# Patient Record
Sex: MALE | Race: WHITE | NOT HISPANIC OR LATINO
[De-identification: names, ages, dates, MRNs, and addresses within clinical notes are randomized per-mention and may not be internally consistent; named-entity substitution may affect disease eponyms.]

---

## 2018-05-08 ENCOUNTER — APPOINTMENT (OUTPATIENT)
Dept: HEART AND VASCULAR | Facility: CLINIC | Age: 53
End: 2018-05-08
Payer: COMMERCIAL

## 2018-05-08 VITALS
TEMPERATURE: 97.8 F | HEART RATE: 70 BPM | DIASTOLIC BLOOD PRESSURE: 84 MMHG | OXYGEN SATURATION: 97 % | BODY MASS INDEX: 31.5 KG/M2 | WEIGHT: 220 LBS | HEIGHT: 70 IN | SYSTOLIC BLOOD PRESSURE: 128 MMHG

## 2018-05-08 DIAGNOSIS — F17.200 NICOTINE DEPENDENCE, UNSPECIFIED, UNCOMPLICATED: ICD-10-CM

## 2018-05-08 PROCEDURE — 93000 ELECTROCARDIOGRAM COMPLETE: CPT

## 2018-05-08 PROCEDURE — 99386 PREV VISIT NEW AGE 40-64: CPT | Mod: 25

## 2019-05-29 ENCOUNTER — INPATIENT (INPATIENT)
Facility: HOSPITAL | Age: 54
LOS: 8 days | Discharge: HOME HEALTH | End: 2019-06-07
Attending: THORACIC SURGERY (CARDIOTHORACIC VASCULAR SURGERY) | Admitting: THORACIC SURGERY (CARDIOTHORACIC VASCULAR SURGERY)
Payer: COMMERCIAL

## 2019-05-29 VITALS
WEIGHT: 220.02 LBS | DIASTOLIC BLOOD PRESSURE: 83 MMHG | SYSTOLIC BLOOD PRESSURE: 160 MMHG | TEMPERATURE: 98 F | RESPIRATION RATE: 18 BRPM | OXYGEN SATURATION: 99 % | HEART RATE: 76 BPM

## 2019-05-29 DIAGNOSIS — J95.821 ACUTE POSTPROCEDURAL RESPIRATORY FAILURE: ICD-10-CM
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
## 2019-05-29 LAB
ALBUMIN SERPL ELPH-MCNC: 4.4 G/DL — SIGNIFICANT CHANGE UP (ref 3.5–5.2)
ALP SERPL-CCNC: 96 U/L — SIGNIFICANT CHANGE UP (ref 30–115)
ALT FLD-CCNC: 45 U/L — HIGH (ref 0–41)
ANION GAP SERPL CALC-SCNC: 10 MMOL/L — SIGNIFICANT CHANGE UP (ref 7–14)
ANION GAP SERPL CALC-SCNC: 14 MMOL/L — SIGNIFICANT CHANGE UP (ref 7–14)
APTT BLD: 29.3 SEC — SIGNIFICANT CHANGE UP (ref 27–39.2)
AST SERPL-CCNC: 25 U/L — SIGNIFICANT CHANGE UP (ref 0–41)
BASOPHILS # BLD AUTO: 0.04 K/UL — SIGNIFICANT CHANGE UP (ref 0–0.2)
BASOPHILS NFR BLD AUTO: 0.4 % — SIGNIFICANT CHANGE UP (ref 0–1)
BILIRUB SERPL-MCNC: 0.3 MG/DL — SIGNIFICANT CHANGE UP (ref 0.2–1.2)
BUN SERPL-MCNC: 19 MG/DL — SIGNIFICANT CHANGE UP (ref 10–20)
BUN SERPL-MCNC: 22 MG/DL — HIGH (ref 10–20)
CALCIUM SERPL-MCNC: 9.7 MG/DL — SIGNIFICANT CHANGE UP (ref 8.5–10.1)
CALCIUM SERPL-MCNC: 9.7 MG/DL — SIGNIFICANT CHANGE UP (ref 8.5–10.1)
CHLORIDE SERPL-SCNC: 102 MMOL/L — SIGNIFICANT CHANGE UP (ref 98–110)
CHLORIDE SERPL-SCNC: 105 MMOL/L — SIGNIFICANT CHANGE UP (ref 98–110)
CHOLEST SERPL-MCNC: 214 MG/DL — HIGH (ref 100–200)
CK MB CFR SERPL CALC: 3.4 NG/ML — SIGNIFICANT CHANGE UP (ref 0.6–6.3)
CO2 SERPL-SCNC: 27 MMOL/L — SIGNIFICANT CHANGE UP (ref 17–32)
CO2 SERPL-SCNC: 27 MMOL/L — SIGNIFICANT CHANGE UP (ref 17–32)
CREAT SERPL-MCNC: 0.9 MG/DL — SIGNIFICANT CHANGE UP (ref 0.7–1.5)
CREAT SERPL-MCNC: 1.1 MG/DL — SIGNIFICANT CHANGE UP (ref 0.7–1.5)
EOSINOPHIL # BLD AUTO: 0.27 K/UL — SIGNIFICANT CHANGE UP (ref 0–0.7)
EOSINOPHIL NFR BLD AUTO: 2.8 % — SIGNIFICANT CHANGE UP (ref 0–8)
ESTIMATED AVERAGE GLUCOSE: 131 MG/DL — HIGH (ref 68–114)
GLUCOSE SERPL-MCNC: 118 MG/DL — HIGH (ref 70–99)
GLUCOSE SERPL-MCNC: 142 MG/DL — HIGH (ref 70–99)
HBA1C BLD-MCNC: 6.2 % — HIGH (ref 4–5.6)
HCT VFR BLD CALC: 44.2 % — SIGNIFICANT CHANGE UP (ref 42–52)
HCT VFR BLD CALC: 45.4 % — SIGNIFICANT CHANGE UP (ref 42–52)
HDLC SERPL-MCNC: 33 MG/DL — LOW
HGB BLD-MCNC: 15.1 G/DL — SIGNIFICANT CHANGE UP (ref 14–18)
HGB BLD-MCNC: 15.2 G/DL — SIGNIFICANT CHANGE UP (ref 14–18)
IMM GRANULOCYTES NFR BLD AUTO: 0.3 % — SIGNIFICANT CHANGE UP (ref 0.1–0.3)
INR BLD: 1.04 RATIO — SIGNIFICANT CHANGE UP (ref 0.65–1.3)
LIDOCAIN IGE QN: 38 U/L — SIGNIFICANT CHANGE UP (ref 7–60)
LIPID PNL WITH DIRECT LDL SERPL: 163 MG/DL — HIGH (ref 4–129)
LYMPHOCYTES # BLD AUTO: 2.77 K/UL — SIGNIFICANT CHANGE UP (ref 1.2–3.4)
LYMPHOCYTES # BLD AUTO: 28.9 % — SIGNIFICANT CHANGE UP (ref 20.5–51.1)
MAGNESIUM SERPL-MCNC: 1.9 MG/DL — SIGNIFICANT CHANGE UP (ref 1.8–2.4)
MAGNESIUM SERPL-MCNC: 1.9 MG/DL — SIGNIFICANT CHANGE UP (ref 1.8–2.4)
MCHC RBC-ENTMCNC: 29.2 PG — SIGNIFICANT CHANGE UP (ref 27–31)
MCHC RBC-ENTMCNC: 29.6 PG — SIGNIFICANT CHANGE UP (ref 27–31)
MCHC RBC-ENTMCNC: 33.5 G/DL — SIGNIFICANT CHANGE UP (ref 32–37)
MCHC RBC-ENTMCNC: 34.2 G/DL — SIGNIFICANT CHANGE UP (ref 32–37)
MCV RBC AUTO: 86.7 FL — SIGNIFICANT CHANGE UP (ref 80–94)
MCV RBC AUTO: 87.1 FL — SIGNIFICANT CHANGE UP (ref 80–94)
MONOCYTES # BLD AUTO: 0.78 K/UL — HIGH (ref 0.1–0.6)
MONOCYTES NFR BLD AUTO: 8.1 % — SIGNIFICANT CHANGE UP (ref 1.7–9.3)
NEUTROPHILS # BLD AUTO: 5.7 K/UL — SIGNIFICANT CHANGE UP (ref 1.4–6.5)
NEUTROPHILS NFR BLD AUTO: 59.5 % — SIGNIFICANT CHANGE UP (ref 42.2–75.2)
NRBC # BLD: 0 /100 WBCS — SIGNIFICANT CHANGE UP (ref 0–0)
NRBC # BLD: 0 /100 WBCS — SIGNIFICANT CHANGE UP (ref 0–0)
NT-PROBNP SERPL-SCNC: 27 PG/ML — SIGNIFICANT CHANGE UP (ref 0–300)
PLATELET # BLD AUTO: 203 K/UL — SIGNIFICANT CHANGE UP (ref 130–400)
PLATELET # BLD AUTO: 211 K/UL — SIGNIFICANT CHANGE UP (ref 130–400)
POTASSIUM SERPL-MCNC: 4.2 MMOL/L — SIGNIFICANT CHANGE UP (ref 3.5–5)
POTASSIUM SERPL-MCNC: 4.3 MMOL/L — SIGNIFICANT CHANGE UP (ref 3.5–5)
POTASSIUM SERPL-SCNC: 4.2 MMOL/L — SIGNIFICANT CHANGE UP (ref 3.5–5)
POTASSIUM SERPL-SCNC: 4.3 MMOL/L — SIGNIFICANT CHANGE UP (ref 3.5–5)
PROT SERPL-MCNC: 8.1 G/DL — HIGH (ref 6–8)
PROTHROM AB SERPL-ACNC: 12 SEC — SIGNIFICANT CHANGE UP (ref 9.95–12.87)
RBC # BLD: 5.1 M/UL — SIGNIFICANT CHANGE UP (ref 4.7–6.1)
RBC # BLD: 5.21 M/UL — SIGNIFICANT CHANGE UP (ref 4.7–6.1)
RBC # FLD: 13.2 % — SIGNIFICANT CHANGE UP (ref 11.5–14.5)
RBC # FLD: 13.3 % — SIGNIFICANT CHANGE UP (ref 11.5–14.5)
SODIUM SERPL-SCNC: 142 MMOL/L — SIGNIFICANT CHANGE UP (ref 135–146)
SODIUM SERPL-SCNC: 143 MMOL/L — SIGNIFICANT CHANGE UP (ref 135–146)
TOTAL CHOLESTEROL/HDL RATIO MEASUREMENT: 6.5 RATIO — HIGH (ref 4–5.5)
TRIGL SERPL-MCNC: 152 MG/DL — HIGH (ref 10–149)
TROPONIN T SERPL-MCNC: 0.02 NG/ML — HIGH
TROPONIN T SERPL-MCNC: <0.01 NG/ML — SIGNIFICANT CHANGE UP
WBC # BLD: 7.93 K/UL — SIGNIFICANT CHANGE UP (ref 4.8–10.8)
WBC # BLD: 9.59 K/UL — SIGNIFICANT CHANGE UP (ref 4.8–10.8)
WBC # FLD AUTO: 7.93 K/UL — SIGNIFICANT CHANGE UP (ref 4.8–10.8)
WBC # FLD AUTO: 9.59 K/UL — SIGNIFICANT CHANGE UP (ref 4.8–10.8)

## 2019-05-29 PROCEDURE — 93016 CV STRESS TEST SUPVJ ONLY: CPT

## 2019-05-29 PROCEDURE — 71045 X-RAY EXAM CHEST 1 VIEW: CPT | Mod: 26,59

## 2019-05-29 PROCEDURE — 78452 HT MUSCLE IMAGE SPECT MULT: CPT | Mod: 26

## 2019-05-29 PROCEDURE — 93018 CV STRESS TEST I&R ONLY: CPT

## 2019-05-29 PROCEDURE — 71046 X-RAY EXAM CHEST 2 VIEWS: CPT | Mod: 26

## 2019-05-29 PROCEDURE — 99285 EMERGENCY DEPT VISIT HI MDM: CPT | Mod: 25

## 2019-05-29 PROCEDURE — 93010 ELECTROCARDIOGRAM REPORT: CPT | Mod: 59

## 2019-05-29 PROCEDURE — 99232 SBSQ HOSP IP/OBS MODERATE 35: CPT

## 2019-05-29 RX ORDER — NITROGLYCERIN 6.5 MG
0.4 CAPSULE, EXTENDED RELEASE ORAL
Refills: 0 | Status: DISCONTINUED | OUTPATIENT
Start: 2019-05-29 | End: 2019-05-31

## 2019-05-29 RX ORDER — LISINOPRIL 2.5 MG/1
5 TABLET ORAL DAILY
Refills: 0 | Status: DISCONTINUED | OUTPATIENT
Start: 2019-05-29 | End: 2019-05-30

## 2019-05-29 RX ORDER — ASPIRIN/CALCIUM CARB/MAGNESIUM 324 MG
324 TABLET ORAL ONCE
Refills: 0 | Status: COMPLETED | OUTPATIENT
Start: 2019-05-29 | End: 2019-05-29

## 2019-05-29 RX ORDER — ATORVASTATIN CALCIUM 80 MG/1
40 TABLET, FILM COATED ORAL AT BEDTIME
Refills: 0 | Status: DISCONTINUED | OUTPATIENT
Start: 2019-05-29 | End: 2019-05-31

## 2019-05-29 RX ORDER — METOPROLOL TARTRATE 50 MG
50 TABLET ORAL
Refills: 0 | Status: DISCONTINUED | OUTPATIENT
Start: 2019-05-29 | End: 2019-05-30

## 2019-05-29 RX ORDER — CHLORHEXIDINE GLUCONATE 213 G/1000ML
1 SOLUTION TOPICAL
Refills: 0 | Status: DISCONTINUED | OUTPATIENT
Start: 2019-05-29 | End: 2019-05-31

## 2019-05-29 RX ORDER — ENOXAPARIN SODIUM 100 MG/ML
40 INJECTION SUBCUTANEOUS EVERY 24 HOURS
Refills: 0 | Status: DISCONTINUED | OUTPATIENT
Start: 2019-05-29 | End: 2019-05-30

## 2019-05-29 RX ORDER — ATORVASTATIN CALCIUM 80 MG/1
20 TABLET, FILM COATED ORAL AT BEDTIME
Refills: 0 | Status: DISCONTINUED | OUTPATIENT
Start: 2019-05-29 | End: 2019-05-29

## 2019-05-29 RX ORDER — ASPIRIN/CALCIUM CARB/MAGNESIUM 324 MG
81 TABLET ORAL DAILY
Refills: 0 | Status: DISCONTINUED | OUTPATIENT
Start: 2019-05-29 | End: 2019-05-31

## 2019-05-29 RX ADMIN — Medication 50 MILLIGRAM(S): at 18:50

## 2019-05-29 RX ADMIN — ENOXAPARIN SODIUM 40 MILLIGRAM(S): 100 INJECTION SUBCUTANEOUS at 18:55

## 2019-05-29 RX ADMIN — Medication 81 MILLIGRAM(S): at 12:35

## 2019-05-29 RX ADMIN — Medication 324 MILLIGRAM(S): at 02:35

## 2019-05-29 RX ADMIN — LISINOPRIL 5 MILLIGRAM(S): 2.5 TABLET ORAL at 18:50

## 2019-05-29 RX ADMIN — ATORVASTATIN CALCIUM 40 MILLIGRAM(S): 80 TABLET, FILM COATED ORAL at 21:37

## 2019-05-29 NOTE — CONSULT NOTE ADULT - SUBJECTIVE AND OBJECTIVE BOX
CHIEF COMPLAINT:Patient is a 53y old  Male who presents with a chief complaint of Chest discomfort (29 May 2019 03:55)      HISTORY OF PRESENT ILLNESS:   HPI:  The patient is a 53-year-old male with no known PMH who presented with a three-day history of chest discomfort.  He described the discomfort as mid-sternal and intermittent (lasting about 10 minutes at a time and up to two episodes per day).  It is associated with diaphoresis, radiation to the jaw, and an abnormal sensation in her arms ("like my arms are without blood").  He has been experiencing chest discomfort for the past several months but noticed that they have been occurring more frequently over the past 3 days.  Otherwise, he denied having fever, chills, N/V/D, abdominal pain, diarrhea, or urinary complaints.  To note, he has not seen a medical doctor in many years. (29 May 2019 03:55)    PAST MEDICAL & SURGICAL HISTORY:  No pertinent past medical history  No significant past surgical history    FAMILY HISTORY:  Family history of heart disease: Mother and father in their 60&#x27;s    Allergies    No Known Allergies    Intolerances    	  Home Medications:    MEDICATIONS  (STANDING):  aspirin  chewable 81 milliGRAM(s) Oral daily  atorvastatin 20 milliGRAM(s) Oral at bedtime  chlorhexidine 4% Liquid 1 Application(s) Topical <User Schedule>  enoxaparin Injectable 40 milliGRAM(s) SubCutaneous every 24 hours    MEDICATIONS  (PRN):  nitroglycerin     SubLingual 0.4 milliGRAM(s) SubLingual every 5 minutes PRN Chest Pain        SOCIAL HISTORY:    [ ] Non-smoker  [ ] Smoker: 1 ppd  [ ] Alcohol: socially     REVIEW OF SYSTEMS:  CONSTITUTIONAL: No fever, weight loss, or fatigue  CARDIOLOGY: PAtient denies chest pain, shortness of breath or syncopal episodes.   RESPIRATORY: denies shortness of breath, wheezeing.   NEUROLOGICAL: NO weakness, no focal deficits to report.  ENDOCRINOLOGICAL: no recent change in diabetic medications.   GI: no BRBPR, no N,V,diarrhea.    PSYCHIATRY: normal mood and affect  HEENT: no nasal discharge, no ecchymosis  SKIN: no ecchymosis, no breakdown  MUSCULOSKELETAL: Full range of motion x4.      PHYSICAL EXAM:  T(C): 35.8 (19 @ 07:33), Max: 36.6 (19 @ 01:44)  HR: 95 (19 @ 07:33) (76 - 95)  BP: 179/100 (19 @ 07:33) (160/83 - 179/100)  RR: 17 (19 @ 07:33) (17 - 18)  SpO2: 95% (19 @ 07:33) (95% - 99%)  Wt(kg): --  I&O's Summary    Daily     Daily     General Appearance: Normal	  Cardiovascular: Normal S1 S2, No JVD, No murmurs, No edema  Respiratory: Lungs clear to auscultation	  Psychiatry: A & O x 3, Mood & affect appropriate  Gastrointestinal:  Soft, Non-tender  Skin: No rashes, No ecchymoses, No cyanosis	  Neurologic: Non-focal  Extremities/MsK: Normal range of motion, No clubbing, cyanosis or edema  Vascular: Peripheral pulses palpable 2+ bilaterally        LABS:	 	                        15.1   7.93  )-----------( 203      ( 29 May 2019 11:30 )             44.2         142  |  105  |  19  ----------------------------<  118<H>  4.3   |  27  |  0.9      143  |  102  |  22<H>  ----------------------------<  142<H>  4.2   |  27  |  1.1    Ca    9.7      29 May 2019 11:30  Ca    9.7      29 May 2019 01:58  Mg     1.9       Mg     1.9         TPro  8.1<H>  /  Alb  4.4  /  TBili  0.3  /  DBili  x   /  AST  25  /  ALT  45<H>  /  AlkPhos  96        proBNP: Serum Pro-Brain Natriuretic Peptide: 27 pg/mL ( @ 01:58)    Lipid Profile: LDL: 163, T  HgA1c:   TSH:       CARDIAC MARKERS:  Troponin T, Serum: <0.01 ng/mL ( @ 11:30)  Troponin T, Serum: 0.02 ng/mL ( @ 01:58)    ECG:  NSR    RADIOLOGY:  Xray Chest 2 Views PA/Lat (19)  No radiographic evidence of acute cardiopulmonary disease. CHIEF COMPLAINT:Patient is a 53y old  Male who presents with a chief complaint of Chest discomfort (29 May 2019 03:55)      HISTORY OF PRESENT ILLNESS:   HPI:  The patient is a 53-year-old male with no known PMH who presented with a three-day history of chest discomfort.  He described the discomfort as mid-sternal and intermittent (lasting about 10 minutes at a time and up to two episodes per day).  It is associated with diaphoresis, radiation to the jaw, and an abnormal sensation in her arms ("like my arms are without blood").  He has been experiencing chest discomfort for the past several months but noticed that they have been occurring more frequently over the past 3 days.  Otherwise, he denied having fever, chills, N/V/D, abdominal pain, diarrhea, or urinary complaints.  To note, he has not seen a medical doctor in many years. (29 May 2019 03:55)    PAST MEDICAL & SURGICAL HISTORY:  No pertinent past medical history  No significant past surgical history    FAMILY HISTORY:  Family history of heart disease: Mother and father in their 60&#x27;s    Allergies    No Known Allergies    Intolerances    	  Home Medications:    MEDICATIONS  (STANDING):  aspirin  chewable 81 milliGRAM(s) Oral daily  atorvastatin 20 milliGRAM(s) Oral at bedtime  chlorhexidine 4% Liquid 1 Application(s) Topical <User Schedule>  enoxaparin Injectable 40 milliGRAM(s) SubCutaneous every 24 hours    MEDICATIONS  (PRN):  nitroglycerin     SubLingual 0.4 milliGRAM(s) SubLingual every 5 minutes PRN Chest Pain        SOCIAL HISTORY:    [ ] Non-smoker  [ ] Smoker: 1 ppd  [ ] Alcohol: socially     REVIEW OF SYSTEMS:  CONSTITUTIONAL: No fever, weight loss, or fatigue  CARDIOLOGY: PAtient denies chest pain, shortness of breath or syncopal episodes.   RESPIRATORY: denies shortness of breath, wheezeing.   NEUROLOGICAL: NO weakness, no focal deficits to report.  ENDOCRINOLOGICAL: no recent change in diabetic medications.   GI: no BRBPR, no N,V,diarrhea.    PSYCHIATRY: normal mood and affect  HEENT: no nasal discharge, no ecchymosis  SKIN: no ecchymosis, no breakdown  MUSCULOSKELETAL: Full range of motion x4.      PHYSICAL EXAM:  T(C): 35.8 (19 @ 07:33), Max: 36.6 (19 @ 01:44)  HR: 95 (19 @ 07:33) (76 - 95)  BP: 179/100 (19 @ 07:33) (160/83 - 179/100)  RR: 17 (19 @ 07:33) (17 - 18)  SpO2: 95% (19 @ 07:33) (95% - 99%)  Wt(kg): --  I&O's Summary    Daily     Daily     General Appearance: Normal	  HEENT: NC/AT  Neck: no JVD  Cardiovascular: Normal S1 S2, No JVD, No murmurs, No edema  Respiratory: Lungs clear to auscultation	  Psychiatry: A & O x 3, Mood & affect appropriate  Gastrointestinal:  Soft, Non-tender  Skin: No rashes, No ecchymoses, No cyanosis	  Neurologic: Non-focal  Extremities/MsK: Normal range of motion, No clubbing, cyanosis or edema  Vascular: Peripheral pulses palpable 2+ bilaterally        LABS:	 	                        15.1   7.93  )-----------( 203      ( 29 May 2019 11:30 )             44.2         142  |  105  |  19  ----------------------------<  118<H>  4.3   |  27  |  0.9      143  |  102  |  22<H>  ----------------------------<  142<H>  4.2   |  27  |  1.1    Ca    9.7      29 May 2019 11:30  Ca    9.7      29 May 2019 01:58  Mg     1.9       Mg     1.9         TPro  8.1<H>  /  Alb  4.4  /  TBili  0.3  /  DBili  x   /  AST  25  /  ALT  45<H>  /  AlkPhos  96        proBNP: Serum Pro-Brain Natriuretic Peptide: 27 pg/mL ( @ 01:58)    Lipid Profile: LDL: 163, T  HgA1c:   TSH:       CARDIAC MARKERS:  Troponin T, Serum: <0.01 ng/mL ( @ 11:30)  Troponin T, Serum: 0.02 ng/mL ( @ 01:58)    ECG:  NSR    RADIOLOGY:  Xray Chest 2 Views PA/Lat (19)  No radiographic evidence of acute cardiopulmonary disease.

## 2019-05-29 NOTE — ED PROVIDER NOTE - ATTENDING CONTRIBUTION TO CARE
54 yo M, poor medical follow up, long standing tobacco and alcohol use, here for assessment of episodic CP over the last 3 days. Pain is intermittent, pressure like, occasionally exertional, associated with nausea and profound diaphoresis. No FH of CAD at a young age or sudden cardiac death.    VS unremarkable, EKG is normal. The patient has unremarkable exam and is pain free at the time of assessment.    Despite reassuring exam and EKG, the patient's story is suggestive of ischemic heart disease. Will need labs, reassessment

## 2019-05-29 NOTE — CONSULT NOTE ADULT - ASSESSMENT
A 53-year-old male with no known PMH who presented with a three-day history of chest discomfort.    # Stable Angina  - troponin: <0.01 <-- 0.02  - EKG: NSR with no acute ST changes  - c/w ASA 81  - start metoprolol titrate 50mg q12, Lipitor 80mg daily   - check 2D echo  - start ASA 81 mg daily and Lipitor 20 mg qHS  - pending exercise stress test    # hypertension  - bp elevated  - start metoprolol 50mg q12    Attending to see patient A 53-year-old male with no known PMH who presented with a three-day history of chest discomfort    # Stable Angina  - troponin: <0.01 <-- 0.02  - EKG: NSR with no acute ST changes  - c/w ASA 81  - start metoprolol titrate 50mg q12, Lipitor 80mg daily   - check 2D echo  - start ASA 81 mg daily and Lipitor 20 mg qHS  - plan for cardiac cath tomorrow  - NPO after midnight     # hypertension  - bp elevated  - start metoprolol 50mg q12 and lisinopril 5mg q24    Attending to see patient A 53-year-old male with no known PMH who presented with a three-day history of chest discomfort    # Unstable Angina  - troponin: <0.01 <-- 0.02  Stress test abnormal  - EKG: NSR with no acute ST changes  - c/w ASA 81  - start metoprolol titrate 50mg q12, Lipitor 40mg daily   - check 2D echo  - start lisinopril  - plan for cardiac cath tomorrow  - NPO after midnight     # hypertension  - bp elevated  - start metoprolol 50mg q12 and lisinopril 5mg q24    Smoking cessation discussed and strongly recommended. Patient is not ready to quit.  Further recommendations after cath.

## 2019-05-29 NOTE — ED ADULT NURSE NOTE - NSIMPLEMENTINTERV_GEN_ALL_ED
Implemented All Universal Safety Interventions:  [place] to call system. Call bell, personal items and telephone within reach. Instruct patient to call for assistance. Room bathroom lighting operational. Non-slip footwear when patient is off stretcher. Physically safe environment: no spills, clutter or unnecessary equipment. Stretcher in lowest position, wheels locked, appropriate side rails in place.

## 2019-05-29 NOTE — ED PROVIDER NOTE - CLINICAL SUMMARY MEDICAL DECISION MAKING FREE TEXT BOX
Despite having normal EKG, patient has trop of 0.02 and his story is very concerning for ischemic CP. Will admit to tele for monitoring, serial trops and likely provocative testing.

## 2019-05-29 NOTE — ED PROVIDER NOTE - PHYSICAL EXAMINATION
PHYSICAL EXAM:    GENERAL: Alert, appears stated age, well appearing, non-toxic  SKIN: Warm, pink and dry. MMM.    EYE: Normal lids/conjunctiva  ENT: Normal hearing, patent oropharynx  NECK: +supple. No meningismus, or JVD  Pulm: Bilateral BS, normal resp effort, no wheezes, stridor, or retractions  CV: RRR, no M/R/G, 2+and = radial pulses  Abd: soft, non-tender, non-distended  Mskel: no erythema, cyanosis, edema. no calf tenderness  Neuro: AAOx3, 5/5 strength throughout. normal gait.

## 2019-05-29 NOTE — ED ADULT NURSE NOTE - OBJECTIVE STATEMENT
Patient presents to ED with complaints of intermittent midsternal chest pain radiating to the right side of the jaw for last 3 days. Patient denies any PMH.

## 2019-05-29 NOTE — H&P ADULT - NSHPPHYSICALEXAM_GEN_ALL_CORE
Vital Signs Last 24 Hrs  T(C): 36.6 (29 May 2019 01:44), Max: 36.6 (29 May 2019 01:44)  T(F): 97.8 (29 May 2019 01:44), Max: 97.8 (29 May 2019 01:44)  HR: 76 (29 May 2019 01:44) (76 - 76)  BP: 160/83 (29 May 2019 01:44) (160/83 - 160/83)  BP(mean): --  RR: 18 (29 May 2019 01:44) (18 - 18)  SpO2: 99% (29 May 2019 01:44) (99% - 99%)    Physical Exam:    -     General :     -      HEENT:    -      Cardiac:    -      Pulm:    -      GI:    -      Musculoskeletal:    -      Neuro: Vital Signs Last 24 Hrs  T(C): 36.6 (29 May 2019 01:44), Max: 36.6 (29 May 2019 01:44)  T(F): 97.8 (29 May 2019 01:44), Max: 97.8 (29 May 2019 01:44)  HR: 76 (29 May 2019 01:44) (76 - 76)  BP: 160/83 (29 May 2019 01:44) (160/83 - 160/83)  BP(mean): --  RR: 18 (29 May 2019 01:44) (18 - 18)  SpO2: 99% (29 May 2019 01:44) (99% - 99%)    Physical Exam:    -     General : alert, awake and in no acute distress    -      HEENT: normocephalic    -      Cardiac: RRR, normal S1/S2    -      Pulm: CTA B/L    -      GI: abdomen soft, non-tender    -      Musculoskeletal: no lower extremity edema    -      Neuro: AAO x3

## 2019-05-29 NOTE — H&P ADULT - ATTENDING COMMENTS
Patient seen and examined independently. Agree with resident note/ history / physical exam and plan of care with following exceptions/additions/updates. Case discussed with house-staff, nursing and patient/pt decision maker.   pt has no pain, no sob at this time.   complains of exertional chest discomfort for the last few wks.   Vital Signs Last 24 Hrs  T(C): 35.8 (29 May 2019 07:33), Max: 36.6 (29 May 2019 01:44)  T(F): 96.5 (29 May 2019 07:33), Max: 97.8 (29 May 2019 01:44)  HR: 95 (29 May 2019 07:33) (76 - 95)  BP: 179/100 (29 May 2019 07:33) (160/83 - 179/100)  BP(mean): --  RR: 17 (29 May 2019 07:33) (17 - 18)  SpO2: 95% (29 May 2019 07:33) (95% - 99%)  Physical exam:   constitutional NAD, AAOX3, Respiratory  lungs CTA, CVS heart RRR, GI: abdomen Soft NT, ND, BS+, skin: intact  neuro exam non focal.                         15.2   9.59  )-----------( 211      ( 29 May 2019 01:58 )             45.4   CARDIAC MARKERS ( 29 May 2019 01:58 )  x     / 0.02 ng/mL / x     / x     / x        05-29    143  |  102  |  22<H>  ----------------------------<  142<H>  4.2   |  27  |  1.1    Ca    9.7      29 May 2019 01:58  Mg     1.9     05-29    TPro  8.1<H>  /  Alb  4.4  /  TBili  0.3  /  DBili  x   /  AST  25  /  ALT  45<H>  /  AlkPhos  96  05-29    ECG NSR, no acute st-t changes.     a/p  1- chest pain, exertional, possible unstable angina, repeat cardiac enzymes, if neg, stress test,   check cxr, tele, cardio consult     2- active smoker, tobacco dependency, pt declined nicotine replacement.     no known PMHx , pt has not seen a doctor for many years.     #Progress Note Handoff  Pending (specify):  Consults cardio, Tests repeat cardiac enzymes, if negative stress test. cxr  Family discussion: reginald pt, he understands and agrees.   Disposition: Home Patient seen and examined independently. Agree with resident note/ history / physical exam and plan of care with following exceptions/additions/updates. Case discussed with house-staff, nursing and patient/pt decision maker.   pt has no pain, no sob at this time.   complains of exertional chest discomfort for the last few wks.   Vital Signs Last 24 Hrs  T(C): 35.8 (29 May 2019 07:33), Max: 36.6 (29 May 2019 01:44)  T(F): 96.5 (29 May 2019 07:33), Max: 97.8 (29 May 2019 01:44)  HR: 95 (29 May 2019 07:33) (76 - 95)  BP: 179/100 (29 May 2019 07:33) (160/83 - 179/100)  BP(mean): --  RR: 17 (29 May 2019 07:33) (17 - 18)  SpO2: 95% (29 May 2019 07:33) (95% - 99%)  Physical exam:   constitutional NAD, AAOX3, Respiratory  lungs CTA, CVS heart RRR, GI: abdomen Soft NT, ND, BS+, skin: intact  neuro exam non focal.                         15.2   9.59  )-----------( 211      ( 29 May 2019 01:58 )             45.4   CARDIAC MARKERS ( 29 May 2019 01:58 )  x     / 0.02 ng/mL / x     / x     / x        05-29    143  |  102  |  22<H>  ----------------------------<  142<H>  4.2   |  27  |  1.1    Ca    9.7      29 May 2019 01:58  Mg     1.9     05-29    TPro  8.1<H>  /  Alb  4.4  /  TBili  0.3  /  DBili  x   /  AST  25  /  ALT  45<H>  /  AlkPhos  96  05-29    ECG NSR, no acute st-t changes.     a/p  1- chest pain, exertional, possible unstable angina, repeat cardiac enzymes, if neg, stress test,   check cxr, tele, cardio consult   echo  2- active smoker, tobacco dependency, pt declined nicotine replacement.     3- HTN, uncontrolled, depending on the cardiac workup , he will probably need to be on beta blocker and ace inhibitor, but will not give him beta blocker prior to his stress test.   diastolic is very high, will give him one dose of hydralazine for now until stress test is done. after that will start bb and ace-i or diuretics,     no known PMHx , pt has not seen a doctor for many years.     #Progress Note Handoff  Pending (specify):  Consults cardio, Tests repeat cardiac enzymes, if negative stress test. cxr  Family discussion: dw pt, he understands and agrees.   Disposition: Home

## 2019-05-29 NOTE — ED PROVIDER NOTE - OBJECTIVE STATEMENT
54 y/o M without PMH +smoker, +drinks alcohol once a week presents with episodic substernal moderate sharp CP which radiates to the jaw, each episode lasting 15-20 minutes, not always associated with exertion, usually associated with diaphoresis, nausea x 3 days, 2 episodes per day. no vomiting. no provoking/palliating factors. He relates the episode was so bad today that it finally caused him to seek medical care after not seeing a physician in 10 years or so. He relates the diaphoresis was so extreme that it caused him to remove all his clothing for relief. Fhx MI in mom in 60s. no fever, recent illness, recent travel, abdominal pain, back pain. Denies hemoptysis, recent surgery/immobilization, hx cancers, hx PE/DVT, hormone use. no current symptoms.     PMD- Dr. Cabrera

## 2019-05-29 NOTE — H&P ADULT - HISTORY OF PRESENT ILLNESS
The patient is a 53-year-old male with no known PMH who presented with a three-day history of chest discomfort.  He described the discomfort as mid-sternal and intermittent (lasting about 10 minutes at a time and up to two episodes per day).  It is associated with diaphoresis, radiation to the jaw, and an abnormal sensation in her arms ("like my arms are without blood").  He has been experiencing chest discomfort for the past several months but noticed that they have been occurring more frequently over the past 3 days.  Otherwise, he denied having fever, chills, N/V/D, abdominal pain, diarrhea, or urinary complaints.  To note, he has not seen a medical doctor in many years.

## 2019-05-30 LAB
ALBUMIN SERPL ELPH-MCNC: 4.1 G/DL — SIGNIFICANT CHANGE UP (ref 3.5–5.2)
ALP SERPL-CCNC: 80 U/L — SIGNIFICANT CHANGE UP (ref 30–115)
ALT FLD-CCNC: 48 U/L — HIGH (ref 0–41)
ANION GAP SERPL CALC-SCNC: 10 MMOL/L — SIGNIFICANT CHANGE UP (ref 7–14)
ANION GAP SERPL CALC-SCNC: 16 MMOL/L — HIGH (ref 7–14)
APPEARANCE UR: CLEAR — SIGNIFICANT CHANGE UP
AST SERPL-CCNC: 32 U/L — SIGNIFICANT CHANGE UP (ref 0–41)
BASOPHILS # BLD AUTO: 0.02 K/UL — SIGNIFICANT CHANGE UP (ref 0–0.2)
BASOPHILS NFR BLD AUTO: 0.2 % — SIGNIFICANT CHANGE UP (ref 0–1)
BILIRUB SERPL-MCNC: 0.5 MG/DL — SIGNIFICANT CHANGE UP (ref 0.2–1.2)
BILIRUB UR-MCNC: NEGATIVE — SIGNIFICANT CHANGE UP
BLD GP AB SCN SERPL QL: SIGNIFICANT CHANGE UP
BUN SERPL-MCNC: 20 MG/DL — SIGNIFICANT CHANGE UP (ref 10–20)
BUN SERPL-MCNC: 22 MG/DL — HIGH (ref 10–20)
CALCIUM SERPL-MCNC: 8.9 MG/DL — SIGNIFICANT CHANGE UP (ref 8.5–10.1)
CALCIUM SERPL-MCNC: 9.5 MG/DL — SIGNIFICANT CHANGE UP (ref 8.5–10.1)
CHLORIDE SERPL-SCNC: 103 MMOL/L — SIGNIFICANT CHANGE UP (ref 98–110)
CHLORIDE SERPL-SCNC: 104 MMOL/L — SIGNIFICANT CHANGE UP (ref 98–110)
CO2 SERPL-SCNC: 22 MMOL/L — SIGNIFICANT CHANGE UP (ref 17–32)
CO2 SERPL-SCNC: 30 MMOL/L — SIGNIFICANT CHANGE UP (ref 17–32)
COLOR SPEC: YELLOW — SIGNIFICANT CHANGE UP
CREAT SERPL-MCNC: 0.9 MG/DL — SIGNIFICANT CHANGE UP (ref 0.7–1.5)
CREAT SERPL-MCNC: 1.2 MG/DL — SIGNIFICANT CHANGE UP (ref 0.7–1.5)
DIFF PNL FLD: ABNORMAL
EOSINOPHIL # BLD AUTO: 0.23 K/UL — SIGNIFICANT CHANGE UP (ref 0–0.7)
EOSINOPHIL NFR BLD AUTO: 2.8 % — SIGNIFICANT CHANGE UP (ref 0–8)
GLUCOSE SERPL-MCNC: 123 MG/DL — HIGH (ref 70–99)
GLUCOSE SERPL-MCNC: 84 MG/DL — SIGNIFICANT CHANGE UP (ref 70–99)
GLUCOSE UR QL: NEGATIVE — SIGNIFICANT CHANGE UP
HCT VFR BLD CALC: 43.8 % — SIGNIFICANT CHANGE UP (ref 42–52)
HGB BLD-MCNC: 14.8 G/DL — SIGNIFICANT CHANGE UP (ref 14–18)
IMM GRANULOCYTES NFR BLD AUTO: 0.1 % — SIGNIFICANT CHANGE UP (ref 0.1–0.3)
KETONES UR-MCNC: NEGATIVE — SIGNIFICANT CHANGE UP
LEUKOCYTE ESTERASE UR-ACNC: NEGATIVE — SIGNIFICANT CHANGE UP
LYMPHOCYTES # BLD AUTO: 3.4 K/UL — SIGNIFICANT CHANGE UP (ref 1.2–3.4)
LYMPHOCYTES # BLD AUTO: 41.8 % — SIGNIFICANT CHANGE UP (ref 20.5–51.1)
MCHC RBC-ENTMCNC: 29.4 PG — SIGNIFICANT CHANGE UP (ref 27–31)
MCHC RBC-ENTMCNC: 33.8 G/DL — SIGNIFICANT CHANGE UP (ref 32–37)
MCV RBC AUTO: 87.1 FL — SIGNIFICANT CHANGE UP (ref 80–94)
MONOCYTES # BLD AUTO: 0.75 K/UL — HIGH (ref 0.1–0.6)
MONOCYTES NFR BLD AUTO: 9.2 % — SIGNIFICANT CHANGE UP (ref 1.7–9.3)
NEUTROPHILS # BLD AUTO: 3.72 K/UL — SIGNIFICANT CHANGE UP (ref 1.4–6.5)
NEUTROPHILS NFR BLD AUTO: 45.9 % — SIGNIFICANT CHANGE UP (ref 42.2–75.2)
NITRITE UR-MCNC: NEGATIVE — SIGNIFICANT CHANGE UP
NRBC # BLD: 0 /100 WBCS — SIGNIFICANT CHANGE UP (ref 0–0)
PH UR: 6 — SIGNIFICANT CHANGE UP (ref 5–8)
PLATELET # BLD AUTO: 197 K/UL — SIGNIFICANT CHANGE UP (ref 130–400)
POTASSIUM SERPL-MCNC: 4.2 MMOL/L — SIGNIFICANT CHANGE UP (ref 3.5–5)
POTASSIUM SERPL-MCNC: 4.7 MMOL/L — SIGNIFICANT CHANGE UP (ref 3.5–5)
POTASSIUM SERPL-SCNC: 4.2 MMOL/L — SIGNIFICANT CHANGE UP (ref 3.5–5)
POTASSIUM SERPL-SCNC: 4.7 MMOL/L — SIGNIFICANT CHANGE UP (ref 3.5–5)
PROT SERPL-MCNC: 7.4 G/DL — SIGNIFICANT CHANGE UP (ref 6–8)
PROT UR-MCNC: NEGATIVE — SIGNIFICANT CHANGE UP
RBC # BLD: 5.03 M/UL — SIGNIFICANT CHANGE UP (ref 4.7–6.1)
RBC # FLD: 13.5 % — SIGNIFICANT CHANGE UP (ref 11.5–14.5)
SODIUM SERPL-SCNC: 142 MMOL/L — SIGNIFICANT CHANGE UP (ref 135–146)
SODIUM SERPL-SCNC: 143 MMOL/L — SIGNIFICANT CHANGE UP (ref 135–146)
SP GR SPEC: >=1.03 — SIGNIFICANT CHANGE UP (ref 1.01–1.03)
UROBILINOGEN FLD QL: 0.2 — SIGNIFICANT CHANGE UP (ref 0.2–0.2)
WBC # BLD: 8.13 K/UL — SIGNIFICANT CHANGE UP (ref 4.8–10.8)
WBC # FLD AUTO: 8.13 K/UL — SIGNIFICANT CHANGE UP (ref 4.8–10.8)

## 2019-05-30 PROCEDURE — 93306 TTE W/DOPPLER COMPLETE: CPT | Mod: 26

## 2019-05-30 PROCEDURE — 71250 CT THORAX DX C-: CPT | Mod: 26

## 2019-05-30 PROCEDURE — 93880 EXTRACRANIAL BILAT STUDY: CPT | Mod: 26

## 2019-05-30 RX ORDER — ALBUMIN HUMAN 25 %
3000 VIAL (ML) INTRAVENOUS ONCE
Refills: 0 | Status: DISCONTINUED | OUTPATIENT
Start: 2019-05-31 | End: 2019-06-03

## 2019-05-30 RX ORDER — CHLORHEXIDINE GLUCONATE 213 G/1000ML
15 SOLUTION TOPICAL ONCE
Refills: 0 | Status: COMPLETED | OUTPATIENT
Start: 2019-05-30 | End: 2019-05-31

## 2019-05-30 RX ORDER — SODIUM CHLORIDE 9 MG/ML
1000 INJECTION, SOLUTION INTRAVENOUS
Refills: 0 | Status: DISCONTINUED | OUTPATIENT
Start: 2019-05-30 | End: 2019-05-30

## 2019-05-30 RX ORDER — METOPROLOL TARTRATE 50 MG
50 TABLET ORAL ONCE
Refills: 0 | Status: COMPLETED | OUTPATIENT
Start: 2019-05-30 | End: 2019-05-30

## 2019-05-30 RX ORDER — CLOPIDOGREL BISULFATE 75 MG/1
300 TABLET, FILM COATED ORAL ONCE
Refills: 0 | Status: COMPLETED | OUTPATIENT
Start: 2019-05-30 | End: 2019-05-30

## 2019-05-30 RX ORDER — SODIUM CHLORIDE 9 MG/ML
450 INJECTION INTRAMUSCULAR; INTRAVENOUS; SUBCUTANEOUS
Refills: 0 | Status: DISCONTINUED | OUTPATIENT
Start: 2019-05-30 | End: 2019-05-30

## 2019-05-30 RX ORDER — CHLORHEXIDINE GLUCONATE 213 G/1000ML
1 SOLUTION TOPICAL ONCE
Refills: 0 | Status: COMPLETED | OUTPATIENT
Start: 2019-05-30 | End: 2019-05-30

## 2019-05-30 RX ORDER — SODIUM CHLORIDE 9 MG/ML
1000 INJECTION INTRAMUSCULAR; INTRAVENOUS; SUBCUTANEOUS
Refills: 0 | Status: COMPLETED | OUTPATIENT
Start: 2019-05-30 | End: 2019-05-31

## 2019-05-30 RX ADMIN — CHLORHEXIDINE GLUCONATE 1 APPLICATION(S): 213 SOLUTION TOPICAL at 22:30

## 2019-05-30 RX ADMIN — Medication 50 MILLIGRAM(S): at 18:00

## 2019-05-30 RX ADMIN — ATORVASTATIN CALCIUM 40 MILLIGRAM(S): 80 TABLET, FILM COATED ORAL at 23:58

## 2019-05-30 RX ADMIN — Medication 50 MILLIGRAM(S): at 06:15

## 2019-05-30 RX ADMIN — Medication 81 MILLIGRAM(S): at 12:04

## 2019-05-30 RX ADMIN — CLOPIDOGREL BISULFATE 300 MILLIGRAM(S): 75 TABLET, FILM COATED ORAL at 09:24

## 2019-05-30 RX ADMIN — SODIUM CHLORIDE 60 MILLILITER(S): 9 INJECTION, SOLUTION INTRAVENOUS at 09:24

## 2019-05-30 RX ADMIN — CHLORHEXIDINE GLUCONATE 1 APPLICATION(S): 213 SOLUTION TOPICAL at 06:17

## 2019-05-30 RX ADMIN — LISINOPRIL 5 MILLIGRAM(S): 2.5 TABLET ORAL at 06:15

## 2019-05-30 NOTE — PROGRESS NOTE ADULT - SUBJECTIVE AND OBJECTIVE BOX
OVERNIGHT EVENTS:   Negative     MEDICATIONS:  STANDING MEDICATIONS  aspirin  chewable 81 milliGRAM(s) Oral daily  atorvastatin 40 milliGRAM(s) Oral at bedtime  chlorhexidine 4% Liquid 1 Application(s) Topical <User Schedule>  dextrose 5% + sodium chloride 0.45%. 1000 milliLiter(s) IV Continuous <Continuous>  enoxaparin Injectable 40 milliGRAM(s) SubCutaneous every 24 hours  lisinopril 5 milliGRAM(s) Oral daily  metoprolol tartrate 50 milliGRAM(s) Oral two times a day    PRN MEDICATIONS  nitroglycerin     SubLingual 0.4 milliGRAM(s) SubLingual every 5 minutes PRN    VITALS:   T(F): 96.4  HR: 56  BP: 105/57  RR: 18  SpO2: 96%    LABS:                        14.8   8.13  )-----------( 197      ( 30 May 2019 06:42 )             43.8     05-30    143  |  103  |  22<H>  ----------------------------<  123<H>  4.7   |  30  |  1.2    Ca    9.5      30 May 2019 06:42  Mg     1.9     05-29    TPro  8.1<H>  /  Alb  4.4  /  TBili  0.3  /  DBili  x   /  AST  25  /  ALT  45<H>  /  AlkPhos  96  05-29    PT/INR - ( 29 May 2019 01:58 )   PT: 12.00 sec;   INR: 1.04 ratio         PTT - ( 29 May 2019 01:58 )  PTT:29.3 sec      Troponin T, Serum: <0.01 ng/mL (05-29-19 @ 11:30)      CARDIAC MARKERS ( 29 May 2019 11:30 )  x     / <0.01 ng/mL / x     / x     / 3.4 ng/mL  CARDIAC MARKERS ( 29 May 2019 01:58 )  x     / 0.02 ng/mL / x     / x     / x        PHYSICAL EXAM:  GEN: No acute distress  HEENT: NCAT  LUNGS: Clear to auscultation bilaterally   HEART: S1/S2 present. RRR.   ABD: Soft, non-tender, non-distended. Bowel sounds present  EXT: No pitting edema  NEURO: AAOX3

## 2019-05-30 NOTE — PROGRESS NOTE ADULT - ASSESSMENT
52 yo M with no known PMHx, current smoker, presents with constant chest pain x 3 days.    #) Typical chest pain, likely 2/2 stable angina   - EKG: NSR without acute ST changes   - Trop 0.02 --> 0.01   - Normal exercise stress test (05/29), will go for catheterization today  - Start on Lipitor 40 mg QHS, Lopressor 50 mg BID   - s/p Plavix 300 mg x 1, Aspirin 81 mg QD  - D5+NS 0.45% @ 60 cc/hr  - Nitroglycerin SL 0.4 mg Q5H PRN     #) Dyslipidemia   - Continue with Lipitor 40 mg QHS     #) Pre-DM  - A1c 6.2%    #) Essential hypertension, controlled  - Watch for hypotension, /70 on one reading   - Continue with Lopressor 50 mg BID     #) Suspected LATOYA   - Can follow-up PMD/Pulmonary outpatient     #) Suspected COPD, active-smoker  - Advised on smoking cessation    #) GI ppx: PO Protonix 40 mg QD  #) DVT ppx:  Lovenox 40 mg QD     #) Activity: Ambulate as tolerated  #) Diet: NPO for now, except medications.     #) Dispo: Home  #) Full Code

## 2019-05-30 NOTE — CONSULT NOTE ADULT - SUBJECTIVE AND OBJECTIVE BOX
Patient is a 53y old  Male who presents with a chief complaint of Chest discomfort (30 May 2019 12:39)      HPI:  The patient is a 53-year-old male with no known PMH who presented with a three-day history of chest discomfort.  He described the discomfort as mid-sternal and intermittent (lasting about 10 minutes at a time and up to two episodes per day).  It is associated with diaphoresis, radiation to the jaw, and an abnormal sensation in her arms ("like my arms are without blood").  He has been experiencing chest discomfort for the past several months but noticed that they have been occurring more frequently over the past 3 days.  Otherwise, he denied having fever, chills, N/V/D, abdominal pain, diarrhea, or urinary complaints.  To note, he has not seen a medical doctor in many years. (29 May 2019 03:55)      PAST MEDICAL & SURGICAL HISTORY:  No pertinent past medical history  No significant past surgical history                      PREVIOUS DIAGNOSTIC TESTING:      ECHO  FINDINGS:    STRESS  FINDINGS:    CATHETERIZATION  FINDINGS:    MEDICATIONS  (STANDING):  aspirin  chewable 81 milliGRAM(s) Oral daily  atorvastatin 40 milliGRAM(s) Oral at bedtime  chlorhexidine 4% Liquid 1 Application(s) Topical <User Schedule>  dextrose 5% + sodium chloride 0.45%. 1000 milliLiter(s) (60 mL/Hr) IV Continuous <Continuous>  enoxaparin Injectable 40 milliGRAM(s) SubCutaneous every 24 hours  lisinopril 5 milliGRAM(s) Oral daily  metoprolol tartrate 50 milliGRAM(s) Oral two times a day  sodium chloride 0.9%. 450 milliLiter(s) (75 mL/Hr) IV Continuous <Continuous>    MEDICATIONS  (PRN):  nitroglycerin     SubLingual 0.4 milliGRAM(s) SubLingual every 5 minutes PRN Chest Pain      FAMILY HISTORY:  Family history of heart disease: Mother and father in their 60&#x27;s      SOCIAL HISTORY:    CIGARETTES:    ALCOHOL:        Vital Signs Last 24 Hrs  T(C): 35.8 (30 May 2019 13:15), Max: 36.3 (29 May 2019 20:31)  T(F): 96.5 (30 May 2019 13:15), Max: 97.4 (29 May 2019 20:31)  HR: 51 (30 May 2019 13:15) (51 - 79)  BP: 103/70 (30 May 2019 13:15) (103/70 - 166/87)  BP(mean): --  RR: 20 (30 May 2019 13:15) (18 - 20)  SpO2: 96% (29 May 2019 19:55) (96% - 96%)            INTERPRETATION OF TELEMETRY:    ECG:    I&O's Detail      LABS:                        14.8   8.13  )-----------( 197      ( 30 May 2019 06:42 )             43.8     05-30    143  |  103  |  22<H>  ----------------------------<  123<H>  4.7   |  30  |  1.2    Ca    9.5      30 May 2019 06:42  Mg     1.9     05-29    TPro  8.1<H>  /  Alb  4.4  /  TBili  0.3  /  DBili  x   /  AST  25  /  ALT  45<H>  /  AlkPhos  96  05-29    CARDIAC MARKERS ( 29 May 2019 11:30 )  x     / <0.01 ng/mL / x     / x     / 3.4 ng/mL  CARDIAC MARKERS ( 29 May 2019 01:58 )  x     / 0.02 ng/mL / x     / x     / x          PT/INR - ( 29 May 2019 01:58 )   PT: 12.00 sec;   INR: 1.04 ratio         PTT - ( 29 May 2019 01:58 )  PTT:29.3 sec    I&O's Summary    BNP  RADIOLOGY & ADDITIONAL STUDIES:

## 2019-05-30 NOTE — PROGRESS NOTE ADULT - SUBJECTIVE AND OBJECTIVE BOX
SUBJECTIVE:    Patient is a 53y old Male who presents with a chief complaint of Chest discomfort (30 May 2019 11:16)    Currently admitted to medicine with the primary diagnosis of Chest pain     Today is hospital day 1d.     PAST MEDICAL & SURGICAL HISTORY  No pertinent past medical history  No significant past surgical history    ALLERGIES:  No Known Allergies    MEDICATIONS:  STANDING MEDICATIONS  aspirin  chewable 81 milliGRAM(s) Oral daily  atorvastatin 40 milliGRAM(s) Oral at bedtime  chlorhexidine 4% Liquid 1 Application(s) Topical <User Schedule>  dextrose 5% + sodium chloride 0.45%. 1000 milliLiter(s) IV Continuous <Continuous>  enoxaparin Injectable 40 milliGRAM(s) SubCutaneous every 24 hours  lisinopril 5 milliGRAM(s) Oral daily  metoprolol tartrate 50 milliGRAM(s) Oral two times a day    PRN MEDICATIONS  nitroglycerin     SubLingual 0.4 milliGRAM(s) SubLingual every 5 minutes PRN    VITALS:   T(F): 96.4  HR: 56  BP: 105/57  RR: 18  SpO2: 96%    LABS:                        14.8   8.13  )-----------( 197      ( 30 May 2019 06:42 )             43.8     05-30    143  |  103  |  22<H>  ----------------------------<  123<H>  4.7   |  30  |  1.2    Ca    9.5      30 May 2019 06:42  Mg     1.9     05-29    TPro  8.1<H>  /  Alb  4.4  /  TBili  0.3  /  DBili  x   /  AST  25  /  ALT  45<H>  /  AlkPhos  96  05-29    PT/INR - ( 29 May 2019 01:58 )   PT: 12.00 sec;   INR: 1.04 ratio         PTT - ( 29 May 2019 01:58 )  PTT:29.3 sec          CARDIAC MARKERS ( 29 May 2019 11:30 )  x     / <0.01 ng/mL / x     / x     / 3.4 ng/mL  CARDIAC MARKERS ( 29 May 2019 01:58 )  x     / 0.02 ng/mL / x     / x     / x          RADIOLOGY:    PHYSICAL EXAM:  GEN: No acute distress  LUNGS: Clear to auscultation bilaterally   HEART: S1/S2 present. RRR.   ABD/ GI: Soft, non-tender, non-distended. Bowel sounds present  EXT: NC/NC/NE/2+PP/WEI  NEURO: AAOX3

## 2019-05-30 NOTE — CONSULT NOTE ADULT - ASSESSMENT
CTS ATTENDING    Patient interviewed and examined  Case reviewed and angiogram discussed with   Patient referred for CABG  Procedure explained to patient in detail, including risks, benefits and alternatives, and patient agreed to proceed with surgery tomorrow    Will need to check platelet function /verify-now in the morning.

## 2019-05-30 NOTE — PROGRESS NOTE ADULT - SUBJECTIVE AND OBJECTIVE BOX
Patient would like to switch cardiology care to Dr. Parra.  Dr. Parra was called by the primary team.    Will sign off the case.

## 2019-05-30 NOTE — CONSULT NOTE ADULT - SUBJECTIVE AND OBJECTIVE BOX
Surgeon: /Shivam/ Gilbert    Consult requesting by: Aida    HISTORY OF PRESENT ILLNESS:  The patient is a 53-year-old male with no known PMH who presented with a three-day history of chest discomfort.  He described the discomfort as mid-sternal and intermittent (lasting about 10 minutes at a time and up to two episodes per day).  It is associated with diaphoresis, radiation to the jaw, and an abnormal sensation in her arms ("like my arms are without blood").  He has been experiencing chest discomfort for the past several months but noticed that they have been occurring more frequently over the past 3 days. No chest pain now. Cardiac cath just completed and CTS called for CABG evaluation    NYHA functional class    [ ] Class I (no limitation) [ ] Class II (slight limitation) [ ] Class III (marked limitation) [ ] Class IV (symptoms at rest)    PAST MEDICAL & SURGICAL HISTORY:  No pertinent past medical history  No significant past surgical history    MEDICATIONS  (STANDING):  aspirin  chewable 81 milliGRAM(s) Oral daily  atorvastatin 40 milliGRAM(s) Oral at bedtime  chlorhexidine 4% Liquid 1 Application(s) Topical <User Schedule>  dextrose 5% + sodium chloride 0.45%. 1000 milliLiter(s) (60 mL/Hr) IV Continuous <Continuous>  enoxaparin Injectable 40 milliGRAM(s) SubCutaneous every 24 hours  lisinopril 5 milliGRAM(s) Oral daily  metoprolol tartrate 50 milliGRAM(s) Oral two times a day  sodium chloride 0.9%. 450 milliLiter(s) (75 mL/Hr) IV Continuous <Continuous>    MEDICATIONS  (PRN):  nitroglycerin     SubLingual 0.4 milliGRAM(s) SubLingual every 5 minutes PRN Chest Pain    Antiplatelet therapy:  Plavix 300mg in ED last night and 75 mg today                         Last dose/amt:    Allergies    No Known Allergies    Intolerances    SOCIAL HISTORY:  Smoker: [ ] Yes 1PPD x 35 years  ETOH use: [ ] Yes social  Ilicit Drug use:   [ ] No  Occupation:  Lives with: family  Assisted device use: no  5 meter walk test: 1____sec, 2____sec, 3___sec no done just had groin cath  FAMILY HISTORY:  Family history of heart disease: Mother < 65 MI    Review of Systems  CONSTITUTIONAL: no fever, chills or night sweats]   NEURO:  denies seizures, paralysis or paresthesias                                                                                EYES: wears glasses, no double vision, no blurry vision                                                                          ENMT: no difficulty hearing, vertigo, dysphagia ,epistaxis recent dental work [ ]                                      CV:  denies chest pain, RUEDA or palpitations at current time                                                                                            RESPIRATORY:  no cough or hemoptysis                                                                 GI:  no nausea, vomiting, constipation or diarrhea   : denies dysuria, hematuria, incontinence or retention                                                                                           MUSKULOSKELETAL:  denies joint swelling or muscle weakness  PSYCH:  denies dementia, depression, anxiety   ENDOCRINE:  cold intolerance[ ] heat intolerance[ ] polydipsia[ ]                                                                                                                                                                                                PHYSICAL EXAM  Vital Signs Last 24 Hrs  T(C): 35.8 (30 May 2019 13:15), Max: 36.3 (29 May 2019 20:31)  T(F): 96.5 (30 May 2019 13:15), Max: 97.4 (29 May 2019 20:31)  HR: 51 (30 May 2019 13:15) (51 - 79)  BP: 103/70 (30 May 2019 13:15) (103/70 - 166/87)  BP(mean): --  RR: 20 (30 May 2019 13:15) (18 - 20)  SpO2: 96% (29 May 2019 19:55) (96% - 96%)    CONSTITUTIONAL:    well nourished, well developed, overweight in NAD                                                                       Neuro: oriented to person/place & time with no focal motor or sensory  deficits     Eyes: PERRLA, EOMI, no nystagmus, sclera anicteric  ENT:  nasal/oral mucosa with absence of cyanosis, fair dentition  Neck: no jugular vein distention, trachea midline, no goiter   Chest: bilatertal breath sounds with good air movement absence of wheezes, rales, or rhonchi                                                                           CV:  RSR, S1S2, no significant murmur appreciated  Carotids: No Bruits  GI:  soft, non-tender non-distended, + bowel sounds                                                                                                          Extremities:  no evidence of cyanosis or deformity, no pedal edema Edema  Extremity Pulses: right / left:  femorals cath in place/ 2+; DP's 2+/2+; radials 2+/2+  Allens - appears radial dominant  SKIN : no rashes                                                          LABS:                      14.8   8.13  )-----------( 197      ( 30 May 2019 06:42 )             43.8     05-30    143  |  103  |  22<H>  ----------------------------<  123<H>  4.7   |  30  |  1.2    Ca    9.5      30 May 2019 06:42  Mg     1.9     05-29    TPro  8.1<H>  /  Alb  4.4  /  TBili  0.3  /  DBili  x   /  AST  25  /  ALT  45<H>  /  AlkPhos  96  05-29    PT/INR - ( 29 May 2019 01:58 )   PT: 12.00 sec;   INR: 1.04 ratio         PTT - ( 29 May 2019 01:58 )  PTT:29.3 sec    CARDIAC MARKERS ( 29 May 2019 11:30 )  x     / <0.01 ng/mL / x     / x     / 3.4 ng/mL  CARDIAC MARKERS ( 29 May 2019 01:58 )  x     / 0.02 ng/mL / x     / x     / x        Cardiac Cath: 90 prox LAD, 90% OM1, 60% prox RCA with IFR of 0.92    TTE / JAN: < from: Transthoracic Echocardiogram (05.30.19 @ 11:26) >   1. Left ventricular ejection fraction, by visual estimation, is 50 to   55%.   2. Spectral Doppler shows impaired relaxation pattern of left   ventricular myocardial filling (Grade I diastolic dysfunction).  < from: Stress Test Exercise Report (05.29.19 @ 15:45) >   Summary      Time In Exercise Phase_^00:08:08^_      Max. Systolic BP_^193^_mmHg      Max Diastolic BP_^91^_mmHg      Max Heart Rate_^146^_BPM      Max Predicted Heart Rate_^165^_BPM      Target HR Formula_^(220 - Age)*100%^_      Reason for Test_^ED low risk chest pain^_      Arrhythmias_^ventricular premature beats^_      Resting ECG_^NSR, nonspecific T wave abnormality^_      ST Changes_^ST depression^_      Overall Impression_^Positive stress test suggestive ofischemia^_  Correlate with MPI.      Chest Pain_^chest pain with excertion^_      HR Response To Exercise_^exaggerated with exercise^_      BP Response To Exercise_^resting hypertension - appropriate response^_      Reason For Termination_^test completed^_      Functional Capacity_^Normal^_     Medication Name&Dosage&Admin Method&Category none     Diagnois Line Positive stress test suggestive of ischemia; Correlate with MPI.; TEST     Diagnois Line TEMINATED     Diagnois Line  WITH C/O CP RATED CP RATED 6/10 DURING exercise. CP RESOLVED AT REST ( PT     Diagnois Line  STEPPED OFF TREADMILL)     Diagnois Line      Diagnois Line Confirmed by aMrty Kate ([id number]) on 5/29/2019 5:53:52 PM  < from: Xray Chest 1 View- PORTABLE-Routine (05.29.19 @ 22:41) >  Impression:      No radiographic evidence of acute cardiopulmonary disease.      Recommendation: (Procedures/Evaluations)  CT Chest without contrast  Carotid Duplex   PFT : Simple PFT   Verify now    STS Score:   : Isolated CAB CALCULATE   Risk of Mortality: 	0.340% 	  Renal Failure: 	0.502% 	  Permanent Stroke: 	0.354% 	  Prolonged Ventilation: 	2.632% 	  DSW Infection: 	0.167% 	  Reoperation: 	1.275% 	  Morbidity or Mortality: 	4.150% 	  Short Length of Stay: 	77.245% 	  Long Length of Stay: 	1.023	  Cardiac risk factors:  Smoker  Family history    Impression:    CAD  with ACS      Assessment/ Plan:    52 yo male with multi-vessel CAD for CABG tomorrow if pre-op testing ok Surgeon: /Shivam/ Gilbert    Consult requesting by: Aida    HISTORY OF PRESENT ILLNESS:  The patient is a 53-year-old male with no known PMH who presented with a three-day history of chest discomfort.  He described the discomfort as mid-sternal and intermittent (lasting about 10 minutes at a time and up to two episodes per day).  It is associated with diaphoresis, radiation to the jaw, and an abnormal sensation in her arms ("like my arms are without blood").  He has been experiencing chest discomfort for the past several months but noticed that they have been occurring more frequently over the past 3 days. No chest pain now. Cardiac cath just completed and CTS called for CABG evaluation    NYHA functional class    [ ] Class I (no limitation) [ ] Class II (slight limitation) [ ] Class III (marked limitation) [ ] Class IV (symptoms at rest)    PAST MEDICAL & SURGICAL HISTORY:  No pertinent past medical history  No significant past surgical history    MEDICATIONS  (STANDING):  aspirin  chewable 81 milliGRAM(s) Oral daily  atorvastatin 40 milliGRAM(s) Oral at bedtime  chlorhexidine 4% Liquid 1 Application(s) Topical <User Schedule>  dextrose 5% + sodium chloride 0.45%. 1000 milliLiter(s) (60 mL/Hr) IV Continuous <Continuous>  enoxaparin Injectable 40 milliGRAM(s) SubCutaneous every 24 hours  lisinopril 5 milliGRAM(s) Oral daily  metoprolol tartrate 50 milliGRAM(s) Oral two times a day  sodium chloride 0.9%. 450 milliLiter(s) (75 mL/Hr) IV Continuous <Continuous>    MEDICATIONS  (PRN):  nitroglycerin     SubLingual 0.4 milliGRAM(s) SubLingual every 5 minutes PRN Chest Pain    Antiplatelet therapy:  Plavix 300mg in ED last night and 75 mg today                         Last dose/amt:    Allergies    No Known Allergies    Intolerances    SOCIAL HISTORY:  Smoker: [ ] Yes 1PPD x 35 years  ETOH use: [ ] Yes social  Ilicit Drug use:   [ ] No  Occupation:  Lives with: family  Assisted device use: no  5 meter walk test: 1____sec, 2____sec, 3___sec no done just had groin cath  FAMILY HISTORY:  Family history of heart disease: Mother < 65 MI    Review of Systems  CONSTITUTIONAL: no fever, chills or night sweats]   NEURO:  denies seizures, paralysis or paresthesias                                                                                EYES: wears glasses, no double vision, no blurry vision                                                                          ENMT: no difficulty hearing, vertigo, dysphagia ,epistaxis recent dental work [ ]                                      CV:  denies chest pain, RUEDA or palpitations at current time                                                                                            RESPIRATORY:  no cough or hemoptysis                                                                 GI:  no nausea, vomiting, constipation or diarrhea   : denies dysuria, hematuria, incontinence or retention                                                                                           MUSKULOSKELETAL:  denies joint swelling or muscle weakness  PSYCH:  denies dementia, depression, anxiety   ENDOCRINE:  cold intolerance[ ] heat intolerance[ ] polydipsia[ ]                                                                                                                                                                                                PHYSICAL EXAM  Vital Signs Last 24 Hrs  T(C): 35.8 (30 May 2019 13:15), Max: 36.3 (29 May 2019 20:31)  T(F): 96.5 (30 May 2019 13:15), Max: 97.4 (29 May 2019 20:31)  HR: 51 (30 May 2019 13:15) (51 - 79)  BP: 103/70 (30 May 2019 13:15) (103/70 - 166/87)  BP(mean): --  RR: 20 (30 May 2019 13:15) (18 - 20)  SpO2: 96% (29 May 2019 19:55) (96% - 96%)    CONSTITUTIONAL:    well nourished, well developed, overweight in NAD                                                                       Neuro: oriented to person/place & time with no focal motor or sensory  deficits     Eyes: PERRLA, EOMI, no nystagmus, sclera anicteric  ENT:  nasal/oral mucosa with absence of cyanosis, fair dentition  Neck: no jugular vein distention, trachea midline, no goiter   Chest: bilatertal breath sounds with good air movement absence of wheezes, rales, or rhonchi                                                                           CV:  RSR, S1S2, no significant murmur appreciated  Carotids: No Bruits  GI:  soft, non-tender non-distended, + bowel sounds                                                                                                          Extremities:  no evidence of cyanosis or deformity, no pedal edema Edema  Extremity Pulses: right / left:  femorals cath in place/ 2+; DP's 2+/2+; radials 2+/2+  Allens - appears radial dominant  SKIN : no rashes                                                          LABS:                      14.8   8.13  )-----------( 197      ( 30 May 2019 06:42 )             43.8     05-30    143  |  103  |  22<H>  ----------------------------<  123<H>  4.7   |  30  |  1.2    Ca    9.5      30 May 2019 06:42  Mg     1.9     05-29    TPro  8.1<H>  /  Alb  4.4  /  TBili  0.3  /  DBili  x   /  AST  25  /  ALT  45<H>  /  AlkPhos  96  05-29    PT/INR - ( 29 May 2019 01:58 )   PT: 12.00 sec;   INR: 1.04 ratio         PTT - ( 29 May 2019 01:58 )  PTT:29.3 sec    CARDIAC MARKERS ( 29 May 2019 11:30 )  x     / <0.01 ng/mL / x     / x     / 3.4 ng/mL  CARDIAC MARKERS ( 29 May 2019 01:58 )  x     / 0.02 ng/mL / x     / x     / x        Cardiac Cath: 90 prox LAD, 90% OM1, 60% prox RCA with IFR of 0.92    TTE / JAN: < from: Transthoracic Echocardiogram (05.30.19 @ 11:26) >   1. Left ventricular ejection fraction, by visual estimation, is 50 to   55%.   2. Spectral Doppler shows impaired relaxation pattern of left   ventricular myocardial filling (Grade I diastolic dysfunction).  < from: Stress Test Exercise Report (05.29.19 @ 15:45) >   Summary      Time In Exercise Phase_^00:08:08^_      Max. Systolic BP_^193^_mmHg      Max Diastolic BP_^91^_mmHg      Max Heart Rate_^146^_BPM      Max Predicted Heart Rate_^165^_BPM      Target HR Formula_^(220 - Age)*100%^_      Reason for Test_^ED low risk chest pain^_      Arrhythmias_^ventricular premature beats^_      Resting ECG_^NSR, nonspecific T wave abnormality^_      ST Changes_^ST depression^_      Overall Impression_^Positive stress test suggestive ofischemia^_  Correlate with MPI.      Chest Pain_^chest pain with excertion^_      HR Response To Exercise_^exaggerated with exercise^_      BP Response To Exercise_^resting hypertension - appropriate response^_      Reason For Termination_^test completed^_      Functional Capacity_^Normal^_     Medication Name&Dosage&Admin Method&Category none     Diagnois Line Positive stress test suggestive of ischemia; Correlate with MPI.; TEST     Diagnois Line TEMINATED     Diagnois Line  WITH C/O CP RATED CP RATED 6/10 DURING exercise. CP RESOLVED AT REST ( PT     Diagnois Line  STEPPED OFF TREADMILL)     Diagnois Line      Diagnois Line Confirmed by Marty Kate ([id number]) on 5/29/2019 5:53:52 PM  < from: Xray Chest 1 View- PORTABLE-Routine (05.29.19 @ 22:41) >  Impression:      No radiographic evidence of acute cardiopulmonary disease.      Recommendation: (Procedures/Evaluations)  CT Chest without contrast  Carotid Duplex   PFT : Simple PFT   Verify now    STS Score:   : Isolated CAB CALCULATE   Risk of Mortality: 	0.340% 	  Renal Failure: 	0.502% 	  Permanent Stroke: 	0.354% 	  Prolonged Ventilation: 	2.632% 	  DSW Infection: 	0.167% 	  Reoperation: 	1.275% 	  Morbidity or Mortality: 	4.150% 	  Short Length of Stay: 	77.245% 	  Long Length of Stay: 	1.023	  Cardiac risk factors:  Smoker  Family history    Impression:    CAD  with ACS      Assessment/ Plan:    54 yo male with multi-vessel CAD for CABG tomorrow if pre-op testing ok

## 2019-05-30 NOTE — CHART NOTE - NSCHARTNOTEFT_GEN_A_CORE
PRE-OP DIAGNOSIS: abnormal stress test    PROCEDURE: [facility] with coronary angiography    Physician: Dr Parra  Assistant: Cabrera    ANESTHESIA TYPE:  [  ]General Anesthesia  [ x ] Sedation  [  ] Local/Regional    ESTIMATED BLOOD LOSS:    10   mL    CONDITION  [  ] Critical  [  ] Serious  [  ]Fair  [  x]Good      SPECIMENS REMOVED (IF APPLICABLE):      IV CONTRAST:  60    mL      IMPLANTS (IF APPLICABLE)      FINDINGS    ACCESS:    [ ] right radial artery  [x] right femoral artery    LEFT HEART CATHETERIZATION                                    Left main no disease  LAD:  90% lesions in prox and mid segment                      Left Circumflex: 90% lesion in OM1  Right Coronary Artery: 60% lesion in prox RCA , iFR 0.92      POST-OP DIAGNOSIS  2 vessels CAD.        PLAN OF CARE  [ ] D/C Home today  [ ]  D/C in AM  [ ] Return to In-patient bed  [ ] Admit for observation  [ ] Return for staged procedure:  [ x] CT Surgery consult called  [ ]  Continue DAPT, B-blocker & Statin therapy

## 2019-05-30 NOTE — PROGRESS NOTE ADULT - ASSESSMENT
52 yo M with no known PMHx, current smoker, presents with constant chest pain x 3 days.    #) Typical chest pain, likely 2/2 stable angina   -- Trop 0.02 --> 0.01   - Normal exercise stress test (05/29), will go for catheterization today  - Start on Lipitor 40 mg QHS, Lopressor 50 mg BID   - s/p Plavix 300 mg x 1, Aspirin 81 mg QD  - D5+NS 0.45% at 60 cc/hr  -      #) Dyslipidemia   - Continue with Lipitor 40 mg QHS     #) Pre-DM  - A1c 6.2%    #) Essential hypertension, controlled  - - Continue with Lopressor 50 mg BID     #) Suspected LATOYA   - Can follow-up PMD/Pulmonary outpatient   - Advised on smoking cessation    #) Activity: Ambulate as tolerated  #) Diet: NPO for now, except medications.      Disposition --pending cardiac cath today 52 yo M with no known PMHx, current smoker, presents with constant chest pain x 3 days.    #) Typical chest pain, likely 2/2 stable angina   -- Trop 0.02 --> 0.01   -developed chest pain during exercise stress test (05/29), will go for catheterization today  -  on Lipitor 40 mg QHS, Lopressor 50 mg BID   - s/p Plavix 300 mg x 1, Aspirin 81 mg QD  - D5+NS 0.45% at 60 cc/hr  -      #) Dyslipidemia   - Continue with Lipitor 40 mg QHS     #) Pre-DM  - A1c 6.2%    #) Essential hypertension, controlled  - - Continue with Lopressor 50 mg BID     #) Suspected LATOYA   - Can follow-up PMD/Pulmonary outpatient   - Advised on smoking cessation    #) Activity: Ambulate as tolerated  #) Diet: NPO for now, except medications.      Disposition --pending cardiac cath today

## 2019-05-30 NOTE — PRE-ANESTHESIA EVALUATION ADULT - NSRADCARDRESULTSFT_GEN_ALL_CORE
Summary:   1. Left ventricular ejection fraction, by visual estimation, is 50 to   55%.   2. Spectral Doppler shows impaired relaxation pattern of left   ventricular myocardial filling (Grade I diastolic dysfunction).

## 2019-05-31 ENCOUNTER — TRANSCRIPTION ENCOUNTER (OUTPATIENT)
Age: 54
End: 2019-05-31

## 2019-05-31 LAB
ALBUMIN SERPL ELPH-MCNC: 4.3 G/DL — SIGNIFICANT CHANGE UP (ref 3.5–5.2)
ALP SERPL-CCNC: 50 U/L — SIGNIFICANT CHANGE UP (ref 30–115)
ALT FLD-CCNC: 30 U/L — SIGNIFICANT CHANGE UP (ref 0–41)
ANION GAP SERPL CALC-SCNC: 10 MMOL/L — SIGNIFICANT CHANGE UP (ref 7–14)
ANION GAP SERPL CALC-SCNC: 11 MMOL/L — SIGNIFICANT CHANGE UP (ref 7–14)
APTT BLD: 26.3 SEC — LOW (ref 27–39.2)
AST SERPL-CCNC: 23 U/L — SIGNIFICANT CHANGE UP (ref 0–41)
BASOPHILS # BLD AUTO: 0.02 K/UL — SIGNIFICANT CHANGE UP (ref 0–0.2)
BASOPHILS NFR BLD AUTO: 0.1 % — SIGNIFICANT CHANGE UP (ref 0–1)
BILIRUB SERPL-MCNC: 0.8 MG/DL — SIGNIFICANT CHANGE UP (ref 0.2–1.2)
BLD GP AB SCN SERPL QL: SIGNIFICANT CHANGE UP
BUN SERPL-MCNC: 22 MG/DL — HIGH (ref 10–20)
BUN SERPL-MCNC: 23 MG/DL — HIGH (ref 10–20)
CALCIUM SERPL-MCNC: 8.3 MG/DL — LOW (ref 8.5–10.1)
CALCIUM SERPL-MCNC: 9.3 MG/DL — SIGNIFICANT CHANGE UP (ref 8.5–10.1)
CHLORIDE SERPL-SCNC: 104 MMOL/L — SIGNIFICANT CHANGE UP (ref 98–110)
CHLORIDE SERPL-SCNC: 107 MMOL/L — SIGNIFICANT CHANGE UP (ref 98–110)
CO2 SERPL-SCNC: 23 MMOL/L — SIGNIFICANT CHANGE UP (ref 17–32)
CO2 SERPL-SCNC: 27 MMOL/L — SIGNIFICANT CHANGE UP (ref 17–32)
CREAT SERPL-MCNC: 1 MG/DL — SIGNIFICANT CHANGE UP (ref 0.7–1.5)
CREAT SERPL-MCNC: 1.1 MG/DL — SIGNIFICANT CHANGE UP (ref 0.7–1.5)
EOSINOPHIL # BLD AUTO: 0.11 K/UL — SIGNIFICANT CHANGE UP (ref 0–0.7)
EOSINOPHIL NFR BLD AUTO: 0.6 % — SIGNIFICANT CHANGE UP (ref 0–8)
GAS PNL BLDA: SIGNIFICANT CHANGE UP
GLUCOSE BLDC GLUCOMTR-MCNC: 102 MG/DL — HIGH (ref 70–99)
GLUCOSE BLDC GLUCOMTR-MCNC: 157 MG/DL — HIGH (ref 70–99)
GLUCOSE BLDC GLUCOMTR-MCNC: 158 MG/DL — HIGH (ref 70–99)
GLUCOSE BLDC GLUCOMTR-MCNC: 158 MG/DL — HIGH (ref 70–99)
GLUCOSE BLDC GLUCOMTR-MCNC: 161 MG/DL — HIGH (ref 70–99)
GLUCOSE SERPL-MCNC: 122 MG/DL — HIGH (ref 70–99)
GLUCOSE SERPL-MCNC: 97 MG/DL — SIGNIFICANT CHANGE UP (ref 70–99)
HCT VFR BLD CALC: 33.2 % — LOW (ref 42–52)
HCV AB S/CO SERPL IA: 0.17 S/CO — SIGNIFICANT CHANGE UP (ref 0–0.99)
HCV AB SERPL-IMP: SIGNIFICANT CHANGE UP
HGB BLD-MCNC: 11.1 G/DL — LOW (ref 14–18)
IMM GRANULOCYTES NFR BLD AUTO: 0.4 % — HIGH (ref 0.1–0.3)
INR BLD: 1.33 RATIO — HIGH (ref 0.65–1.3)
LYMPHOCYTES # BLD AUTO: 15 % — LOW (ref 20.5–51.1)
LYMPHOCYTES # BLD AUTO: 2.61 K/UL — SIGNIFICANT CHANGE UP (ref 1.2–3.4)
MAGNESIUM SERPL-MCNC: 2.6 MG/DL — HIGH (ref 1.8–2.4)
MCHC RBC-ENTMCNC: 29.5 PG — SIGNIFICANT CHANGE UP (ref 27–31)
MCHC RBC-ENTMCNC: 33.4 G/DL — SIGNIFICANT CHANGE UP (ref 32–37)
MCV RBC AUTO: 88.3 FL — SIGNIFICANT CHANGE UP (ref 80–94)
MONOCYTES # BLD AUTO: 1.78 K/UL — HIGH (ref 0.1–0.6)
MONOCYTES NFR BLD AUTO: 10.2 % — HIGH (ref 1.7–9.3)
NEUTROPHILS # BLD AUTO: 12.85 K/UL — HIGH (ref 1.4–6.5)
NEUTROPHILS NFR BLD AUTO: 73.7 % — SIGNIFICANT CHANGE UP (ref 42.2–75.2)
NRBC # BLD: 0 /100 WBCS — SIGNIFICANT CHANGE UP (ref 0–0)
PLATELET # BLD AUTO: 137 K/UL — SIGNIFICANT CHANGE UP (ref 130–400)
POTASSIUM SERPL-MCNC: 3.7 MMOL/L — SIGNIFICANT CHANGE UP (ref 3.5–5)
POTASSIUM SERPL-MCNC: 4.1 MMOL/L — SIGNIFICANT CHANGE UP (ref 3.5–5)
POTASSIUM SERPL-SCNC: 3.7 MMOL/L — SIGNIFICANT CHANGE UP (ref 3.5–5)
POTASSIUM SERPL-SCNC: 4.1 MMOL/L — SIGNIFICANT CHANGE UP (ref 3.5–5)
PROT SERPL-MCNC: 6.2 G/DL — SIGNIFICANT CHANGE UP (ref 6–8)
PROTHROM AB SERPL-ACNC: 15.2 SEC — HIGH (ref 9.95–12.87)
RBC # BLD: 3.76 M/UL — LOW (ref 4.7–6.1)
RBC # FLD: 13.2 % — SIGNIFICANT CHANGE UP (ref 11.5–14.5)
SODIUM SERPL-SCNC: 140 MMOL/L — SIGNIFICANT CHANGE UP (ref 135–146)
SODIUM SERPL-SCNC: 142 MMOL/L — SIGNIFICANT CHANGE UP (ref 135–146)
T3 SERPL-MCNC: 97 NG/DL — SIGNIFICANT CHANGE UP (ref 80–200)
T4 AB SER-ACNC: 5.6 UG/DL — SIGNIFICANT CHANGE UP (ref 4.6–12)
TSH SERPL-MCNC: 2.17 UIU/ML — SIGNIFICANT CHANGE UP (ref 0.27–4.2)
WBC # BLD: 17.44 K/UL — HIGH (ref 4.8–10.8)
WBC # FLD AUTO: 17.44 K/UL — HIGH (ref 4.8–10.8)

## 2019-05-31 PROCEDURE — 71045 X-RAY EXAM CHEST 1 VIEW: CPT | Mod: 26

## 2019-05-31 PROCEDURE — 93010 ELECTROCARDIOGRAM REPORT: CPT

## 2019-05-31 RX ORDER — INSULIN HUMAN 100 [IU]/ML
1 INJECTION, SOLUTION SUBCUTANEOUS
Qty: 100 | Refills: 0 | Status: DISCONTINUED | OUTPATIENT
Start: 2019-05-31 | End: 2019-06-05

## 2019-05-31 RX ORDER — OXYCODONE HYDROCHLORIDE 5 MG/1
5 TABLET ORAL EVERY 4 HOURS
Refills: 0 | Status: DISCONTINUED | OUTPATIENT
Start: 2019-05-31 | End: 2019-06-07

## 2019-05-31 RX ORDER — MAGNESIUM SULFATE 500 MG/ML
1 VIAL (ML) INJECTION EVERY 12 HOURS
Refills: 0 | Status: DISCONTINUED | OUTPATIENT
Start: 2019-05-31 | End: 2019-06-07

## 2019-05-31 RX ORDER — NOREPINEPHRINE BITARTRATE/D5W 8 MG/250ML
0.05 PLASTIC BAG, INJECTION (ML) INTRAVENOUS
Qty: 8 | Refills: 0 | Status: DISCONTINUED | OUTPATIENT
Start: 2019-05-31 | End: 2019-06-01

## 2019-05-31 RX ORDER — NICARDIPINE HYDROCHLORIDE 30 MG/1
5 CAPSULE, EXTENDED RELEASE ORAL
Qty: 40 | Refills: 0 | Status: DISCONTINUED | OUTPATIENT
Start: 2019-05-31 | End: 2019-06-01

## 2019-05-31 RX ORDER — DEXMEDETOMIDINE HYDROCHLORIDE IN 0.9% SODIUM CHLORIDE 4 UG/ML
0.25 INJECTION INTRAVENOUS
Qty: 200 | Refills: 0 | Status: DISCONTINUED | OUTPATIENT
Start: 2019-05-31 | End: 2019-06-01

## 2019-05-31 RX ORDER — ALBUTEROL 90 UG/1
2 AEROSOL, METERED ORAL EVERY 6 HOURS
Refills: 0 | Status: DISCONTINUED | OUTPATIENT
Start: 2019-05-31 | End: 2019-06-01

## 2019-05-31 RX ORDER — SODIUM CHLORIDE 9 MG/ML
1000 INJECTION INTRAMUSCULAR; INTRAVENOUS; SUBCUTANEOUS
Refills: 0 | Status: DISCONTINUED | OUTPATIENT
Start: 2019-05-31 | End: 2019-06-07

## 2019-05-31 RX ORDER — FAMOTIDINE 10 MG/ML
20 INJECTION INTRAVENOUS EVERY 12 HOURS
Refills: 0 | Status: DISCONTINUED | OUTPATIENT
Start: 2019-05-31 | End: 2019-06-01

## 2019-05-31 RX ORDER — PROPOFOL 10 MG/ML
10 INJECTION, EMULSION INTRAVENOUS
Qty: 1000 | Refills: 0 | Status: DISCONTINUED | OUTPATIENT
Start: 2019-05-31 | End: 2019-06-01

## 2019-05-31 RX ORDER — NITROGLYCERIN 6.5 MG
5 CAPSULE, EXTENDED RELEASE ORAL
Qty: 50 | Refills: 0 | Status: DISCONTINUED | OUTPATIENT
Start: 2019-05-31 | End: 2019-06-02

## 2019-05-31 RX ORDER — CHLORHEXIDINE GLUCONATE 213 G/1000ML
5 SOLUTION TOPICAL
Refills: 0 | Status: DISCONTINUED | OUTPATIENT
Start: 2019-05-31 | End: 2019-06-01

## 2019-05-31 RX ORDER — ALBUMIN HUMAN 25 %
500 VIAL (ML) INTRAVENOUS ONCE
Refills: 0 | Status: COMPLETED | OUTPATIENT
Start: 2019-05-31 | End: 2019-05-31

## 2019-05-31 RX ORDER — MEPERIDINE HYDROCHLORIDE 50 MG/ML
25 INJECTION INTRAMUSCULAR; INTRAVENOUS; SUBCUTANEOUS ONCE
Refills: 0 | Status: DISCONTINUED | OUTPATIENT
Start: 2019-05-31 | End: 2019-06-03

## 2019-05-31 RX ORDER — ASPIRIN/CALCIUM CARB/MAGNESIUM 324 MG
300 TABLET ORAL ONCE
Refills: 0 | Status: COMPLETED | OUTPATIENT
Start: 2019-05-31 | End: 2019-05-31

## 2019-05-31 RX ORDER — CEFAZOLIN SODIUM 1 G
1000 VIAL (EA) INJECTION EVERY 8 HOURS
Refills: 0 | Status: COMPLETED | OUTPATIENT
Start: 2019-05-31 | End: 2019-06-01

## 2019-05-31 RX ORDER — DOCUSATE SODIUM 100 MG
100 CAPSULE ORAL THREE TIMES A DAY
Refills: 0 | Status: DISCONTINUED | OUTPATIENT
Start: 2019-05-31 | End: 2019-06-07

## 2019-05-31 RX ORDER — DEXTROSE 50 % IN WATER 50 %
50 SYRINGE (ML) INTRAVENOUS
Refills: 0 | Status: DISCONTINUED | OUTPATIENT
Start: 2019-05-31 | End: 2019-06-02

## 2019-05-31 RX ORDER — IPRATROPIUM BROMIDE 0.2 MG/ML
2 SOLUTION, NON-ORAL INHALATION EVERY 6 HOURS
Refills: 0 | Status: DISCONTINUED | OUTPATIENT
Start: 2019-05-31 | End: 2019-06-01

## 2019-05-31 RX ORDER — CHLORHEXIDINE GLUCONATE 213 G/1000ML
1 SOLUTION TOPICAL
Refills: 0 | Status: DISCONTINUED | OUTPATIENT
Start: 2019-05-31 | End: 2019-06-07

## 2019-05-31 RX ORDER — OXYCODONE HYDROCHLORIDE 5 MG/1
10 TABLET ORAL EVERY 4 HOURS
Refills: 0 | Status: DISCONTINUED | OUTPATIENT
Start: 2019-05-31 | End: 2019-06-07

## 2019-05-31 RX ORDER — VASOPRESSIN 20 [USP'U]/ML
0.04 INJECTION INTRAVENOUS
Qty: 50 | Refills: 0 | Status: DISCONTINUED | OUTPATIENT
Start: 2019-05-31 | End: 2019-06-01

## 2019-05-31 RX ORDER — KETOROLAC TROMETHAMINE 30 MG/ML
30 SYRINGE (ML) INJECTION ONCE
Refills: 0 | Status: DISCONTINUED | OUTPATIENT
Start: 2019-05-31 | End: 2019-05-31

## 2019-05-31 RX ORDER — DEXTROSE 50 % IN WATER 50 %
25 SYRINGE (ML) INTRAVENOUS
Refills: 0 | Status: DISCONTINUED | OUTPATIENT
Start: 2019-05-31 | End: 2019-06-07

## 2019-05-31 RX ORDER — ONDANSETRON 8 MG/1
4 TABLET, FILM COATED ORAL ONCE
Refills: 0 | Status: DISCONTINUED | OUTPATIENT
Start: 2019-05-31 | End: 2019-06-03

## 2019-05-31 RX ORDER — ASPIRIN/CALCIUM CARB/MAGNESIUM 324 MG
325 TABLET ORAL DAILY
Refills: 0 | Status: DISCONTINUED | OUTPATIENT
Start: 2019-05-31 | End: 2019-06-07

## 2019-05-31 RX ADMIN — SODIUM CHLORIDE 60 MILLILITER(S): 9 INJECTION INTRAMUSCULAR; INTRAVENOUS; SUBCUTANEOUS at 05:25

## 2019-05-31 RX ADMIN — Medication 1.5 MICROGRAM(S)/MIN: at 17:37

## 2019-05-31 RX ADMIN — Medication 30 MILLIGRAM(S): at 21:30

## 2019-05-31 RX ADMIN — CHLORHEXIDINE GLUCONATE 5 MILLILITER(S): 213 SOLUTION TOPICAL at 18:04

## 2019-05-31 RX ADMIN — Medication 250 MILLILITER(S): at 18:34

## 2019-05-31 RX ADMIN — CHLORHEXIDINE GLUCONATE 15 MILLILITER(S): 213 SOLUTION TOPICAL at 05:29

## 2019-05-31 RX ADMIN — SODIUM CHLORIDE 20 MILLILITER(S): 9 INJECTION INTRAMUSCULAR; INTRAVENOUS; SUBCUTANEOUS at 17:37

## 2019-05-31 RX ADMIN — Medication 100 MILLIGRAM(S): at 18:04

## 2019-05-31 RX ADMIN — FAMOTIDINE 20 MILLIGRAM(S): 10 INJECTION INTRAVENOUS at 18:05

## 2019-05-31 RX ADMIN — Medication 100 GRAM(S): at 18:04

## 2019-05-31 RX ADMIN — Medication 9.38 MICROGRAM(S)/KG/MIN: at 18:44

## 2019-05-31 RX ADMIN — NICARDIPINE HYDROCHLORIDE 25 MG/HR: 30 CAPSULE, EXTENDED RELEASE ORAL at 17:36

## 2019-05-31 RX ADMIN — Medication 30 MILLIGRAM(S): at 21:45

## 2019-05-31 RX ADMIN — PROPOFOL 6 MICROGRAM(S)/KG/MIN: 10 INJECTION, EMULSION INTRAVENOUS at 17:37

## 2019-05-31 RX ADMIN — INSULIN HUMAN 1 UNIT(S)/HR: 100 INJECTION, SOLUTION SUBCUTANEOUS at 17:36

## 2019-05-31 RX ADMIN — DEXMEDETOMIDINE HYDROCHLORIDE IN 0.9% SODIUM CHLORIDE 6.25 MICROGRAM(S)/KG/HR: 4 INJECTION INTRAVENOUS at 16:02

## 2019-05-31 RX ADMIN — CHLORHEXIDINE GLUCONATE 1 APPLICATION(S): 213 SOLUTION TOPICAL at 05:29

## 2019-05-31 NOTE — DISCHARGE NOTE PROVIDER - NSDCCPTREATMENT_GEN_ALL_CORE_FT
PRINCIPAL PROCEDURE  Procedure: CABG, 2 arterial and 1 venous  Findings and Treatment: Left KRYSTIAN to LAD, Left radial to circumflex and reverse SVG to RCA

## 2019-05-31 NOTE — BRIEF OPERATIVE NOTE - NSICDXBRIEFPOSTOP_GEN_ALL_CORE_FT
POST-OP DIAGNOSIS:  S/P CABG x 3 31-May-2019 08:05:41  Mumtaz San  Acute coronary syndrome 31-May-2019 08:03:40  Mumtaz San

## 2019-05-31 NOTE — DISCHARGE NOTE PROVIDER - NSDCCPCAREPLAN_GEN_ALL_CORE_FT
PRINCIPAL DISCHARGE DIAGNOSIS  Diagnosis: Chest pain  Assessment and Plan of Treatment:       SECONDARY DISCHARGE DIAGNOSES  Diagnosis: Elevated troponin  Assessment and Plan of Treatment: PRINCIPAL DISCHARGE DIAGNOSIS  Diagnosis: Coronary artery disease  Assessment and Plan of Treatment:       SECONDARY DISCHARGE DIAGNOSES  Diagnosis: Elevated troponin  Assessment and Plan of Treatment:

## 2019-05-31 NOTE — DISCHARGE NOTE PROVIDER - PROVIDER TOKENS
PROVIDER:[TOKEN:[[id number]]],PROVIDER:[TOKEN:[[id number]]] PROVIDER:[TOKEN:[[id number]],FOLLOWUP:[1 week]],PROVIDER:[TOKEN:[[id number]]]

## 2019-05-31 NOTE — DISCHARGE NOTE PROVIDER - CARE PROVIDER_API CALL
Shantanu Hunt)  Surgery; Thoracic and Cardiac Surgery  [address] [place] [address], [address]  [place] [address]  Phone: [phone]  Fax: [phone]  Follow Up Time:     Aldo Parra)  Cardiovascular Disease; Internal Medicine; Interventional Cardiology  [address] [place] [address]  Phone: [phone]  Fax: [phone]  Follow Up Time: Shantanu Hunt)  Surgery; Thoracic and Cardiac Surgery  [address] [place] [address], [address]  [place] [address]  Phone: [phone]  Fax: [phone]  Follow Up Time: 1 week    Aldo Parra)  Cardiovascular Disease; Internal Medicine; Interventional Cardiology  [address] [place] [address]  Phone: [phone]  Fax: [phone]  Follow Up Time:

## 2019-05-31 NOTE — DISCHARGE NOTE PROVIDER - CARE PROVIDERS DIRECT ADDRESSES
,DirectAddress_Unknown,[email or web address]

## 2019-05-31 NOTE — DISCHARGE NOTE PROVIDER - NSDCACTIVITY_GEN_ALL_CORE
Do not make important decisions/Walking - Indoors allowed/No heavy lifting/straining/Stairs allowed/Do not drive or operate machinery/Walking - Outdoors allowed/Showering allowed

## 2019-05-31 NOTE — DISCHARGE NOTE PROVIDER - HOSPITAL COURSE
The patient is a 53-year-old male with no known PMH who presented with a three-day history of chest discomfort.  He described the discomfort as mid-sternal and intermittent (lasting about 10 minutes at a time and up to two episodes per day).  It was associated with diaphoresis, radiation to the jaw, and an abnormal sensation in her arms ("like my arms are without blood").  He had been experiencing chest discomfort for the past several months but noticed that they have been occurring more frequently over the 3 days prior to admission. STress test was positive and subsequent cardiac catheterization revealed multi-vessel disease. On 5/31/19, he underwent CABG x 3 utilizing the left radial artery,, left KRYSTIAN and RSVG.  he will need to be on Imdur for the next 6 months secondary to radial artery harvesting, His post-op course The patient is a 53-year-old male with no known PMH who presented with a three-day history of chest discomfort.  He described the discomfort as mid-sternal and intermittent (lasting about 10 minutes at a time and up to two episodes per day).  It was associated with diaphoresis, radiation to the jaw, and an abnormal sensation in her arms ("like my arms are without blood").  He had been experiencing chest discomfort for the past several months but noticed that they have been occurring more frequently over the 3 days prior to admission. STress test was positive and subsequent cardiac catheterization revealed multi-vessel disease. On 5/31/19, he underwent CABG x 3 utilizing the left radial artery,, left KRYSTIAN and RSVG.  he will need to be on Imdur for the next 6 months secondary to radial artery harvesting. Post-operatively, patient was hypoxic requiring bi pap and hi flow and diuresis. He then had a vasovagal episode after diuresis, and diuresis was stopped. Pt o2 sat improved after bipap, Prior to discharge he was 91-94% in RA. Pt was discharged home on POD#7 in stable condition. He is instructed to follow up with Dr. Hunt on 6/11/19 @ 10:45am.

## 2019-05-31 NOTE — DISCHARGE NOTE PROVIDER - NSDCFUADDAPPT_GEN_ALL_CORE_FT
Follow up with Dr. Hunt on June , 2019 at Follow up with Dr. Hunt on June 11 , 2019 at 10:45am.   Surgery; Thoracic and Cardiac Surgery  [address] [place] [address], [facility] [address]  [place] [address]  Phone: [phone]

## 2019-05-31 NOTE — BRIEF OPERATIVE NOTE - NSICDXBRIEFPREOP_GEN_ALL_CORE_FT
PRE-OP DIAGNOSIS:  Chest pain with moderate risk of acute coronary syndrome 31-May-2019 08:03:22  Mumtaz San

## 2019-06-01 LAB
ALBUMIN SERPL ELPH-MCNC: 4.6 G/DL — SIGNIFICANT CHANGE UP (ref 3.5–5.2)
ALP SERPL-CCNC: 49 U/L — SIGNIFICANT CHANGE UP (ref 30–115)
ALT FLD-CCNC: 29 U/L — SIGNIFICANT CHANGE UP (ref 0–41)
ANION GAP SERPL CALC-SCNC: 11 MMOL/L — SIGNIFICANT CHANGE UP (ref 7–14)
APTT BLD: 27.2 SEC — SIGNIFICANT CHANGE UP (ref 27–39.2)
AST SERPL-CCNC: 26 U/L — SIGNIFICANT CHANGE UP (ref 0–41)
BASOPHILS # BLD AUTO: 0 K/UL — SIGNIFICANT CHANGE UP (ref 0–0.2)
BASOPHILS NFR BLD AUTO: 0 % — SIGNIFICANT CHANGE UP (ref 0–1)
BILIRUB SERPL-MCNC: 0.9 MG/DL — SIGNIFICANT CHANGE UP (ref 0.2–1.2)
BUN SERPL-MCNC: 24 MG/DL — HIGH (ref 10–20)
CALCIUM SERPL-MCNC: 8.8 MG/DL — SIGNIFICANT CHANGE UP (ref 8.5–10.1)
CHLORIDE SERPL-SCNC: 107 MMOL/L — SIGNIFICANT CHANGE UP (ref 98–110)
CO2 SERPL-SCNC: 22 MMOL/L — SIGNIFICANT CHANGE UP (ref 17–32)
CREAT SERPL-MCNC: 1 MG/DL — SIGNIFICANT CHANGE UP (ref 0.7–1.5)
EOSINOPHIL # BLD AUTO: 0 K/UL — SIGNIFICANT CHANGE UP (ref 0–0.7)
EOSINOPHIL NFR BLD AUTO: 0 % — SIGNIFICANT CHANGE UP (ref 0–8)
GLUCOSE BLDC GLUCOMTR-MCNC: 110 MG/DL — HIGH (ref 70–99)
GLUCOSE BLDC GLUCOMTR-MCNC: 112 MG/DL — HIGH (ref 70–99)
GLUCOSE BLDC GLUCOMTR-MCNC: 128 MG/DL — HIGH (ref 70–99)
GLUCOSE BLDC GLUCOMTR-MCNC: 144 MG/DL — HIGH (ref 70–99)
GLUCOSE BLDC GLUCOMTR-MCNC: 144 MG/DL — HIGH (ref 70–99)
GLUCOSE BLDC GLUCOMTR-MCNC: 148 MG/DL — HIGH (ref 70–99)
GLUCOSE BLDC GLUCOMTR-MCNC: 153 MG/DL — HIGH (ref 70–99)
GLUCOSE BLDC GLUCOMTR-MCNC: 160 MG/DL — HIGH (ref 70–99)
GLUCOSE SERPL-MCNC: 145 MG/DL — HIGH (ref 70–99)
HCT VFR BLD CALC: 32.2 % — LOW (ref 42–52)
HGB BLD-MCNC: 10.7 G/DL — LOW (ref 14–18)
IMM GRANULOCYTES NFR BLD AUTO: 0.3 % — SIGNIFICANT CHANGE UP (ref 0.1–0.3)
INR BLD: 1.24 RATIO — SIGNIFICANT CHANGE UP (ref 0.65–1.3)
LYMPHOCYTES # BLD AUTO: 1.22 K/UL — SIGNIFICANT CHANGE UP (ref 1.2–3.4)
LYMPHOCYTES # BLD AUTO: 10.9 % — LOW (ref 20.5–51.1)
MAGNESIUM SERPL-MCNC: 2.4 MG/DL — SIGNIFICANT CHANGE UP (ref 1.8–2.4)
MCHC RBC-ENTMCNC: 29.7 PG — SIGNIFICANT CHANGE UP (ref 27–31)
MCHC RBC-ENTMCNC: 33.2 G/DL — SIGNIFICANT CHANGE UP (ref 32–37)
MCV RBC AUTO: 89.4 FL — SIGNIFICANT CHANGE UP (ref 80–94)
MONOCYTES # BLD AUTO: 1.2 K/UL — HIGH (ref 0.1–0.6)
MONOCYTES NFR BLD AUTO: 10.7 % — HIGH (ref 1.7–9.3)
NEUTROPHILS # BLD AUTO: 8.75 K/UL — HIGH (ref 1.4–6.5)
NEUTROPHILS NFR BLD AUTO: 78.1 % — HIGH (ref 42.2–75.2)
NRBC # BLD: 0 /100 WBCS — SIGNIFICANT CHANGE UP (ref 0–0)
PLATELET # BLD AUTO: 132 K/UL — SIGNIFICANT CHANGE UP (ref 130–400)
POTASSIUM SERPL-MCNC: 4.5 MMOL/L — SIGNIFICANT CHANGE UP (ref 3.5–5)
POTASSIUM SERPL-SCNC: 4.5 MMOL/L — SIGNIFICANT CHANGE UP (ref 3.5–5)
PROT SERPL-MCNC: 6.8 G/DL — SIGNIFICANT CHANGE UP (ref 6–8)
PROTHROM AB SERPL-ACNC: 14.2 SEC — HIGH (ref 9.95–12.87)
RBC # BLD: 3.6 M/UL — LOW (ref 4.7–6.1)
RBC # FLD: 13.2 % — SIGNIFICANT CHANGE UP (ref 11.5–14.5)
SODIUM SERPL-SCNC: 140 MMOL/L — SIGNIFICANT CHANGE UP (ref 135–146)
WBC # BLD: 11.2 K/UL — HIGH (ref 4.8–10.8)
WBC # FLD AUTO: 11.2 K/UL — HIGH (ref 4.8–10.8)

## 2019-06-01 PROCEDURE — 71045 X-RAY EXAM CHEST 1 VIEW: CPT | Mod: 26,77

## 2019-06-01 PROCEDURE — 71045 X-RAY EXAM CHEST 1 VIEW: CPT | Mod: 26

## 2019-06-01 PROCEDURE — 99233 SBSQ HOSP IP/OBS HIGH 50: CPT

## 2019-06-01 RX ORDER — INSULIN GLARGINE 100 [IU]/ML
30 INJECTION, SOLUTION SUBCUTANEOUS EVERY MORNING
Refills: 0 | Status: DISCONTINUED | OUTPATIENT
Start: 2019-06-01 | End: 2019-06-06

## 2019-06-01 RX ORDER — DEXTROSE 50 % IN WATER 50 %
15 SYRINGE (ML) INTRAVENOUS ONCE
Refills: 0 | Status: DISCONTINUED | OUTPATIENT
Start: 2019-06-01 | End: 2019-06-07

## 2019-06-01 RX ORDER — ATORVASTATIN CALCIUM 80 MG/1
40 TABLET, FILM COATED ORAL AT BEDTIME
Refills: 0 | Status: DISCONTINUED | OUTPATIENT
Start: 2019-06-01 | End: 2019-06-07

## 2019-06-01 RX ORDER — KETOROLAC TROMETHAMINE 30 MG/ML
30 SYRINGE (ML) INJECTION ONCE
Refills: 0 | Status: DISCONTINUED | OUTPATIENT
Start: 2019-06-01 | End: 2019-06-01

## 2019-06-01 RX ORDER — GLUCAGON INJECTION, SOLUTION 0.5 MG/.1ML
1 INJECTION, SOLUTION SUBCUTANEOUS ONCE
Refills: 0 | Status: DISCONTINUED | OUTPATIENT
Start: 2019-06-01 | End: 2019-06-07

## 2019-06-01 RX ORDER — INSULIN LISPRO 100/ML
4 VIAL (ML) SUBCUTANEOUS
Refills: 0 | Status: DISCONTINUED | OUTPATIENT
Start: 2019-06-01 | End: 2019-06-06

## 2019-06-01 RX ORDER — TAMSULOSIN HYDROCHLORIDE 0.4 MG/1
0.4 CAPSULE ORAL AT BEDTIME
Refills: 0 | Status: DISCONTINUED | OUTPATIENT
Start: 2019-06-01 | End: 2019-06-07

## 2019-06-01 RX ORDER — METOPROLOL TARTRATE 50 MG
12.5 TABLET ORAL
Refills: 0 | Status: DISCONTINUED | OUTPATIENT
Start: 2019-06-01 | End: 2019-06-05

## 2019-06-01 RX ORDER — IPRATROPIUM/ALBUTEROL SULFATE 18-103MCG
3 AEROSOL WITH ADAPTER (GRAM) INHALATION EVERY 6 HOURS
Refills: 0 | Status: DISCONTINUED | OUTPATIENT
Start: 2019-06-01 | End: 2019-06-07

## 2019-06-01 RX ORDER — INSULIN LISPRO 100/ML
VIAL (ML) SUBCUTANEOUS
Refills: 0 | Status: DISCONTINUED | OUTPATIENT
Start: 2019-06-01 | End: 2019-06-02

## 2019-06-01 RX ORDER — FUROSEMIDE 40 MG
40 TABLET ORAL ONCE
Refills: 0 | Status: COMPLETED | OUTPATIENT
Start: 2019-06-01 | End: 2019-06-01

## 2019-06-01 RX ORDER — ISOSORBIDE DINITRATE 5 MG/1
5 TABLET ORAL THREE TIMES A DAY
Refills: 0 | Status: DISCONTINUED | OUTPATIENT
Start: 2019-06-01 | End: 2019-06-05

## 2019-06-01 RX ORDER — SODIUM CHLORIDE 9 MG/ML
1000 INJECTION, SOLUTION INTRAVENOUS
Refills: 0 | Status: DISCONTINUED | OUTPATIENT
Start: 2019-06-01 | End: 2019-06-07

## 2019-06-01 RX ORDER — ENOXAPARIN SODIUM 100 MG/ML
40 INJECTION SUBCUTANEOUS DAILY
Refills: 0 | Status: DISCONTINUED | OUTPATIENT
Start: 2019-06-01 | End: 2019-06-07

## 2019-06-01 RX ORDER — FAMOTIDINE 10 MG/ML
20 INJECTION INTRAVENOUS
Refills: 0 | Status: DISCONTINUED | OUTPATIENT
Start: 2019-06-01 | End: 2019-06-07

## 2019-06-01 RX ADMIN — Medication 30 MILLIGRAM(S): at 05:30

## 2019-06-01 RX ADMIN — Medication 100 GRAM(S): at 17:53

## 2019-06-01 RX ADMIN — OXYCODONE HYDROCHLORIDE 10 MILLIGRAM(S): 5 TABLET ORAL at 14:15

## 2019-06-01 RX ADMIN — Medication 600 MILLIGRAM(S): at 17:55

## 2019-06-01 RX ADMIN — OXYCODONE HYDROCHLORIDE 10 MILLIGRAM(S): 5 TABLET ORAL at 08:55

## 2019-06-01 RX ADMIN — SODIUM CHLORIDE 20 MILLILITER(S): 9 INJECTION INTRAMUSCULAR; INTRAVENOUS; SUBCUTANEOUS at 07:30

## 2019-06-01 RX ADMIN — Medication 30 MILLIGRAM(S): at 05:15

## 2019-06-01 RX ADMIN — ALBUTEROL 2 PUFF(S): 90 AEROSOL, METERED ORAL at 01:04

## 2019-06-01 RX ADMIN — Medication 100 MILLIGRAM(S): at 21:15

## 2019-06-01 RX ADMIN — Medication 2 PUFF(S): at 01:04

## 2019-06-01 RX ADMIN — ALBUTEROL 2 PUFF(S): 90 AEROSOL, METERED ORAL at 07:31

## 2019-06-01 RX ADMIN — OXYCODONE HYDROCHLORIDE 10 MILLIGRAM(S): 5 TABLET ORAL at 08:05

## 2019-06-01 RX ADMIN — Medication 2 PUFF(S): at 07:31

## 2019-06-01 RX ADMIN — INSULIN GLARGINE 30 UNIT(S): 100 INJECTION, SOLUTION SUBCUTANEOUS at 10:26

## 2019-06-01 RX ADMIN — ISOSORBIDE DINITRATE 5 MILLIGRAM(S): 5 TABLET ORAL at 13:17

## 2019-06-01 RX ADMIN — ATORVASTATIN CALCIUM 40 MILLIGRAM(S): 80 TABLET, FILM COATED ORAL at 21:15

## 2019-06-01 RX ADMIN — FAMOTIDINE 20 MILLIGRAM(S): 10 INJECTION INTRAVENOUS at 06:11

## 2019-06-01 RX ADMIN — Medication 12.5 MILLIGRAM(S): at 10:27

## 2019-06-01 RX ADMIN — Medication 3 MILLILITER(S): at 20:29

## 2019-06-01 RX ADMIN — Medication 3 MILLILITER(S): at 13:17

## 2019-06-01 RX ADMIN — ISOSORBIDE DINITRATE 5 MILLIGRAM(S): 5 TABLET ORAL at 21:15

## 2019-06-01 RX ADMIN — INSULIN HUMAN 1 UNIT(S)/HR: 100 INJECTION, SOLUTION SUBCUTANEOUS at 07:29

## 2019-06-01 RX ADMIN — ENOXAPARIN SODIUM 40 MILLIGRAM(S): 100 INJECTION SUBCUTANEOUS at 12:23

## 2019-06-01 RX ADMIN — Medication 4 UNIT(S): at 13:16

## 2019-06-01 RX ADMIN — CHLORHEXIDINE GLUCONATE 1 APPLICATION(S): 213 SOLUTION TOPICAL at 05:15

## 2019-06-01 RX ADMIN — Medication 12.5 MILLIGRAM(S): at 17:53

## 2019-06-01 RX ADMIN — Medication 4 UNIT(S): at 17:51

## 2019-06-01 RX ADMIN — ISOSORBIDE DINITRATE 5 MILLIGRAM(S): 5 TABLET ORAL at 10:27

## 2019-06-01 RX ADMIN — Medication 30 MILLIGRAM(S): at 11:00

## 2019-06-01 RX ADMIN — Medication 40 MILLIGRAM(S): at 23:07

## 2019-06-01 RX ADMIN — Medication 1.5 MICROGRAM(S)/MIN: at 07:30

## 2019-06-01 RX ADMIN — OXYCODONE HYDROCHLORIDE 5 MILLIGRAM(S): 5 TABLET ORAL at 23:06

## 2019-06-01 RX ADMIN — Medication 30 MILLIGRAM(S): at 10:30

## 2019-06-01 RX ADMIN — OXYCODONE HYDROCHLORIDE 10 MILLIGRAM(S): 5 TABLET ORAL at 13:18

## 2019-06-01 RX ADMIN — OXYCODONE HYDROCHLORIDE 10 MILLIGRAM(S): 5 TABLET ORAL at 17:53

## 2019-06-01 RX ADMIN — Medication 100 GRAM(S): at 05:13

## 2019-06-01 RX ADMIN — Medication 100 MILLIGRAM(S): at 01:33

## 2019-06-01 RX ADMIN — Medication 325 MILLIGRAM(S): at 12:24

## 2019-06-01 RX ADMIN — FAMOTIDINE 20 MILLIGRAM(S): 10 INJECTION INTRAVENOUS at 17:53

## 2019-06-01 RX ADMIN — Medication 100 MILLIGRAM(S): at 13:17

## 2019-06-01 NOTE — PROGRESS NOTE ADULT - SUBJECTIVE AND OBJECTIVE BOX
CTU Attending Progress Daily Note     01 Jun 2019 13:18  POD# 1- CABG x3  radial graft  He has history of No pertinent past medical history    Interval event for past 24 hr:  VESNA FERNÁNDEZ  53y had no event.   Current Complains:  VESNA FERNÁNDEZ has no new complains  HPI:  The patient is a 53-year-old male with no known PMH who presented with a three-day history of chest discomfort.  He described the discomfort as mid-sternal and intermittent (lasting about 10 minutes at a time and up to two episodes per day).  It is associated with diaphoresis, radiation to the jaw, and an abnormal sensation in her arms ("like my arms are without blood").  He has been experiencing chest discomfort for the past several months but noticed that they have been occurring more frequently over the past 3 days.  Otherwise, he denied having fever, chills, N/V/D, abdominal pain, diarrhea, or urinary complaints.  To note, he has not seen a medical doctor in many years. (29 May 2019 03:55)    OBJECTIVE:  ICU Vital Signs Last 24 Hrs  T(C): 37.2 (01 Jun 2019 12:00), Max: 37.8 (01 Jun 2019 02:00)  T(F): 99 (01 Jun 2019 12:00), Max: 100 (01 Jun 2019 02:00)  HR: 78 (01 Jun 2019 12:15) (69 - 86)  BP: 124/62 (01 Jun 2019 07:55) (89/53 - 124/62)  BP(mean): 87 (01 Jun 2019 07:55) (66 - 93)  ABP: 131/64 (01 Jun 2019 12:15) (97/58 - 151/74)  ABP(mean): 83 (01 Jun 2019 12:15) (68 - 99)  RR: 15 (01 Jun 2019 12:15) (8 - 29)  SpO2: 95% (01 Jun 2019 12:15) (91% - 100%)    I&O's Summary    31 May 2019 07:01  -  01 Jun 2019 07:00  --------------------------------------------------------  IN: 1503.8 mL / OUT: 1352 mL / NET: 151.8 mL    01 Jun 2019 07:01  -  01 Jun 2019 13:18  --------------------------------------------------------  IN: 464 mL / OUT: 182 mL / NET: 282 mL      I&O's Detail    31 May 2019 07:01  -  01 Jun 2019 07:00  --------------------------------------------------------  IN:    Albumin 5%  - 500 mL: 500 mL    dexmedetomidine Infusion: 77.5 mL    insulin regular Infusion: 55 mL    IV PiggyBack: 200 mL    niCARdipine Infusion: 93 mL    nitroglycerin  Infusion: 108.5 mL    norepinephrine Infusion: 32 mL    propofol Infusion: 55 mL    sodium chloride 0.9%.: 360 mL    vasopressin Infusion: 22.8 mL  Total IN: 1503.8 mL    OUT:    Chest Tube: 205 mL    Chest Tube: 50 mL    Indwelling Catheter - Urethral: 997 mL    Nasoenteral Tube: 100 mL  Total OUT: 1352 mL    Total NET: 151.8 mL      01 Jun 2019 07:01  -  01 Jun 2019 13:18  --------------------------------------------------------  IN:    insulin regular Infusion: 12 mL    nitroglycerin  Infusion: 12 mL    Oral Fluid: 360 mL    sodium chloride 0.9%.: 80 mL  Total IN: 464 mL    OUT:    Chest Tube: 25 mL    Chest Tube: 5 mL    Indwelling Catheter - Urethral: 152 mL  Total OUT: 182 mL    Total NET: 282 mL        Adult Advanced Hemodynamics Last 24 Hrs  CVP(mm Hg): 15 (01 Jun 2019 11:30) (9 - 151)  CVP(cm H2O): --  CO: 7 (01 Jun 2019 11:30) (4.1 - 7.9)  CI: 3.2 (01 Jun 2019 11:30) (1.8 - 3.6)  PA: 34/19 (01 Jun 2019 11:30) (25/12 - 44/23)  PA(mean): 25 (01 Jun 2019 11:30) (17 - 35)  PCWP: --  SVR: 833 (01 Jun 2019 11:30) (-2009 - 1309)  SVRI: 1822 (01 Jun 2019 11:30) (-0992 - 1739)  PVR: --  PVRI: --  Mode: CPAP with PS  FiO2: 40  PEEP: 8  PS: 8    CAPILLARY BLOOD GLUCOSE      POCT Blood Glucose.: 128 mg/dL (01 Jun 2019 12:31)  POCT Blood Glucose.: 112 mg/dL (01 Jun 2019 07:57)  POCT Blood Glucose.: 148 mg/dL (01 Jun 2019 07:02)  POCT Blood Glucose.: 160 mg/dL (01 Jun 2019 06:09)  POCT Blood Glucose.: 153 mg/dL (01 Jun 2019 02:04)  POCT Blood Glucose.: 110 mg/dL (01 Jun 2019 00:21)  POCT Blood Glucose.: 102 mg/dL (31 May 2019 23:29)  POCT Blood Glucose.: 157 mg/dL (31 May 2019 19:57)  POCT Blood Glucose.: 161 mg/dL (31 May 2019 18:23)  POCT Blood Glucose.: 158 mg/dL (31 May 2019 16:54)  POCT Blood Glucose.: 158 mg/dL (31 May 2019 15:39)    LABS:  ABG - ( 01 Jun 2019 03:08 )  pH, Arterial: 7.37  pH, Blood: x     /  pCO2: 42    /  pO2: 88    / HCO3: 24    / Base Excess: -1.2  /  SaO2: 97                                      10.7   11.20 )-----------( 132      ( 01 Jun 2019 02:00 )             32.2     06-01    140  |  107  |  24<H>  ----------------------------<  145<H>  4.5   |  22  |  1.0    Ca    8.8      01 Jun 2019 02:00  Mg     2.4     06-01    TPro  6.8  /  Alb  4.6  /  TBili  0.9  /  DBili  x   /  AST  26  /  ALT  29  /  AlkPhos  49  06-01    PT/INR - ( 01 Jun 2019 02:00 )   PT: 14.20 sec;   INR: 1.24 ratio         PTT - ( 01 Jun 2019 02:00 )  PTT:27.2 sec  Urinalysis Basic - ( 30 May 2019 15:52 )    Color: Yellow / Appearance: Clear / SG: >=1.030 / pH: x  Gluc: x / Ketone: Negative  / Bili: Negative / Urobili: 0.2   Blood: x / Protein: Negative / Nitrite: Negative   Leuk Esterase: Negative / RBC: x / WBC x   Sq Epi: x / Non Sq Epi: x / Bacteria: x        Home Medications:    HOSPITAL MEDICATIONS:  MEDICATIONS  (STANDING):  albumin human  5% IVPB 3000 milliLiter(s) IV Intermittent once  ALBUTerol/ipratropium for Nebulization 3 milliLiter(s) Nebulizer every 6 hours  aspirin enteric coated 325 milliGRAM(s) Oral daily  atorvastatin 40 milliGRAM(s) Oral at bedtime  chlorhexidine 4% Liquid 1 Application(s) Topical <User Schedule>  dextrose 5%. 1000 milliLiter(s) (50 mL/Hr) IV Continuous <Continuous>  dextrose 50% Injectable 50 milliLiter(s) IV Push every 15 minutes  dextrose 50% Injectable 25 milliLiter(s) IV Push every 15 minutes  docusate sodium 100 milliGRAM(s) Oral three times a day  enoxaparin Injectable 40 milliGRAM(s) SubCutaneous daily  famotidine    Tablet 20 milliGRAM(s) Oral two times a day  guaiFENesin  milliGRAM(s) Oral every 12 hours  insulin glargine Injectable (LANTUS) 30 Unit(s) SubCutaneous every morning  insulin lispro (HumaLOG) corrective regimen sliding scale   SubCutaneous three times a day before meals  insulin lispro Injectable (HumaLOG) 4 Unit(s) SubCutaneous three times a day before meals  insulin regular Infusion 1 Unit(s)/Hr (1 mL/Hr) IV Continuous <Continuous>  isosorbide   dinitrate Tablet (ISORDIL) 5 milliGRAM(s) Oral three times a day  magnesium sulfate  IVPB 1 Gram(s) IV Intermittent every 12 hours  meperidine     Injectable 25 milliGRAM(s) IV Push once  metoprolol tartrate 12.5 milliGRAM(s) Oral two times a day  nitroglycerin  Infusion 5 MICROgram(s)/Min (1.5 mL/Hr) IV Continuous <Continuous>  sodium chloride 0.9%. 1000 milliLiter(s) (20 mL/Hr) IV Continuous <Continuous>    MEDICATIONS  (PRN):  dextrose 40% Gel 15 Gram(s) Oral once PRN Blood Glucose LESS THAN 70 milliGRAM(s)/deciliter  glucagon  Injectable 1 milliGRAM(s) IntraMuscular once PRN Glucose LESS THAN 70 milligrams/deciliter  ondansetron  IVPB 4 milliGRAM(s) IV Intermittent once PRN Nausea and/or Vomiting  oxyCODONE    IR 5 milliGRAM(s) Oral every 4 hours PRN Moderate Pain (4 - 6)  oxyCODONE    IR 10 milliGRAM(s) Oral every 4 hours PRN Severe Pain (7 - 10)      REVIEW OF SYSTEMS:  CONSTITUTIONAL: [X] all negative; [ ] weakness, [ ] fevers, [ ] chills  EYES/ENT: [X] all negative; [ ] visual changes, [ ] vertigo, [ ] throat pain   NECK: [X] all negative; [ ] pain, [ ] stiffness  RESPIRATORY: [] all negative, [ ] cough, [ ] wheezing, [ ] hemoptysis, [ ] shortness of breath  CARDIOVASCULAR: [] all negative; [ ] chest pain, [ ] palpitations, [ ] orthopnea  GASTROINTESTINAL: [X] all negative; [ ]abdominal pain, [ ] nausea, [ ] vomiting, [ ] hematemesis, [ ] diarrhea, [ ] constipation, [ ] melena, [ ] hematochezia.  GENITOURINARY: [X] all negative; [ ] dysuria, [ ] frequency, [ ] hematuria  NEUROLOGICAL: [X] all negative; [ ] numbness, [ ] weakness  SKIN: [X] all negative; [ ] itching, [ ] burning, [ ] rashes, [ ] lesions   All other review of systems is negative unless indicated above.    [  ] Unable to assess ROS because     PHYSICAL EXAM:          CONSTITUTIONAL: Well-developed; well-nourished; in no acute distress.   	SKIN: warm, dry  	HEAD: Normocephalic; atraumatic.  	EYES: PERRL, EOM, no conj injection, sclera clear  	ENT: No nasal discharge; airway clear.  	NECK: Supple; non tender.  No midline ttp ctls  	CARD: S1, S2 normal; no murmurs, gallops, or rubs. Regular rate and rhythm. 2+ RPs and DPs bilat, no carotid bruits, no pedal   edema, no calf pain b/l  	RESP: CTA  bilat good air movement No wheezes, rales or rhonchi.  	ABD: Soft, not tender, not distended, no CVA ttp no rebound or guarding, bowel sounds present  	EXT: Normal ROM.  No clubbing, cyanosis or edema.   	  	NEURO: Alert, awake, motor 5/5 R, 5/5 L        RADIOLOGY:  xray    Mild congestion

## 2019-06-01 NOTE — PROGRESS NOTE ADULT - ASSESSMENT
HPI:  The patient is a 53-year-old male with no known PMH   found to have CAD   1- POD# 1 CABG  radial graft  2- CAD atheroscrolotic heart disease  3- HTN  4- anemia of acute blood loss    Plan  - ASA Bta blockers   add Isordil for radial graft    start statins  - f/u CBC h/h and plt daily  - d/c swan and sump reevaluate later for chest tube removal  - up in chair  d/c a line and krause cathter

## 2019-06-01 NOTE — PROGRESS NOTE ADULT - SUBJECTIVE AND OBJECTIVE BOX
CTS ATTENDING    POD#1 post ABGx3  Extubated  Sump and Chest tube removed  Remove Swanzey  OOB  Ambulate later today  Needs better pain control    Discussed with CTU Team

## 2019-06-02 LAB
ANION GAP SERPL CALC-SCNC: 12 MMOL/L — SIGNIFICANT CHANGE UP (ref 7–14)
APTT BLD: 20.7 SEC — CRITICAL LOW (ref 27–39.2)
BASOPHILS # BLD AUTO: 0.01 K/UL — SIGNIFICANT CHANGE UP (ref 0–0.2)
BASOPHILS NFR BLD AUTO: 0.1 % — SIGNIFICANT CHANGE UP (ref 0–1)
BUN SERPL-MCNC: 21 MG/DL — HIGH (ref 10–20)
CALCIUM SERPL-MCNC: 8.9 MG/DL — SIGNIFICANT CHANGE UP (ref 8.5–10.1)
CHLORIDE SERPL-SCNC: 101 MMOL/L — SIGNIFICANT CHANGE UP (ref 98–110)
CO2 SERPL-SCNC: 27 MMOL/L — SIGNIFICANT CHANGE UP (ref 17–32)
CREAT SERPL-MCNC: 1 MG/DL — SIGNIFICANT CHANGE UP (ref 0.7–1.5)
EOSINOPHIL # BLD AUTO: 0.01 K/UL — SIGNIFICANT CHANGE UP (ref 0–0.7)
EOSINOPHIL NFR BLD AUTO: 0.1 % — SIGNIFICANT CHANGE UP (ref 0–8)
GLUCOSE BLDC GLUCOMTR-MCNC: 112 MG/DL — HIGH (ref 70–99)
GLUCOSE BLDC GLUCOMTR-MCNC: 138 MG/DL — HIGH (ref 70–99)
GLUCOSE BLDC GLUCOMTR-MCNC: 156 MG/DL — HIGH (ref 70–99)
GLUCOSE BLDC GLUCOMTR-MCNC: 162 MG/DL — HIGH (ref 70–99)
GLUCOSE BLDC GLUCOMTR-MCNC: 186 MG/DL — HIGH (ref 70–99)
GLUCOSE SERPL-MCNC: 157 MG/DL — HIGH (ref 70–99)
HCT VFR BLD CALC: 31.6 % — LOW (ref 42–52)
HGB BLD-MCNC: 10.4 G/DL — LOW (ref 14–18)
IMM GRANULOCYTES NFR BLD AUTO: 0.4 % — HIGH (ref 0.1–0.3)
INR BLD: 1.29 RATIO — SIGNIFICANT CHANGE UP (ref 0.65–1.3)
LYMPHOCYTES # BLD AUTO: 1.1 K/UL — LOW (ref 1.2–3.4)
LYMPHOCYTES # BLD AUTO: 9.9 % — LOW (ref 20.5–51.1)
MAGNESIUM SERPL-MCNC: 2.2 MG/DL — SIGNIFICANT CHANGE UP (ref 1.8–2.4)
MCHC RBC-ENTMCNC: 29.9 PG — SIGNIFICANT CHANGE UP (ref 27–31)
MCHC RBC-ENTMCNC: 32.9 G/DL — SIGNIFICANT CHANGE UP (ref 32–37)
MCV RBC AUTO: 90.8 FL — SIGNIFICANT CHANGE UP (ref 80–94)
MONOCYTES # BLD AUTO: 1.14 K/UL — HIGH (ref 0.1–0.6)
MONOCYTES NFR BLD AUTO: 10.2 % — HIGH (ref 1.7–9.3)
NEUTROPHILS # BLD AUTO: 8.86 K/UL — HIGH (ref 1.4–6.5)
NEUTROPHILS NFR BLD AUTO: 79.3 % — HIGH (ref 42.2–75.2)
NRBC # BLD: 0 /100 WBCS — SIGNIFICANT CHANGE UP (ref 0–0)
PLATELET # BLD AUTO: 130 K/UL — SIGNIFICANT CHANGE UP (ref 130–400)
POTASSIUM SERPL-MCNC: 4.4 MMOL/L — SIGNIFICANT CHANGE UP (ref 3.5–5)
POTASSIUM SERPL-SCNC: 4.4 MMOL/L — SIGNIFICANT CHANGE UP (ref 3.5–5)
PROTHROM AB SERPL-ACNC: 14.8 SEC — HIGH (ref 9.95–12.87)
RBC # BLD: 3.48 M/UL — LOW (ref 4.7–6.1)
RBC # FLD: 13.9 % — SIGNIFICANT CHANGE UP (ref 11.5–14.5)
SODIUM SERPL-SCNC: 140 MMOL/L — SIGNIFICANT CHANGE UP (ref 135–146)
WBC # BLD: 11.16 K/UL — HIGH (ref 4.8–10.8)
WBC # FLD AUTO: 11.16 K/UL — HIGH (ref 4.8–10.8)

## 2019-06-02 PROCEDURE — 99291 CRITICAL CARE FIRST HOUR: CPT

## 2019-06-02 PROCEDURE — 99024 POSTOP FOLLOW-UP VISIT: CPT

## 2019-06-02 PROCEDURE — 93010 ELECTROCARDIOGRAM REPORT: CPT

## 2019-06-02 PROCEDURE — 71045 X-RAY EXAM CHEST 1 VIEW: CPT | Mod: 26

## 2019-06-02 RX ORDER — FUROSEMIDE 40 MG
40 TABLET ORAL DAILY
Refills: 0 | Status: DISCONTINUED | OUTPATIENT
Start: 2019-06-02 | End: 2019-06-03

## 2019-06-02 RX ADMIN — Medication 12.5 MILLIGRAM(S): at 17:03

## 2019-06-02 RX ADMIN — Medication 100 GRAM(S): at 05:50

## 2019-06-02 RX ADMIN — FAMOTIDINE 20 MILLIGRAM(S): 10 INJECTION INTRAVENOUS at 05:48

## 2019-06-02 RX ADMIN — Medication 40 MILLIGRAM(S): at 06:31

## 2019-06-02 RX ADMIN — Medication 4 UNIT(S): at 16:43

## 2019-06-02 RX ADMIN — INSULIN GLARGINE 30 UNIT(S): 100 INJECTION, SOLUTION SUBCUTANEOUS at 08:03

## 2019-06-02 RX ADMIN — Medication 325 MILLIGRAM(S): at 11:47

## 2019-06-02 RX ADMIN — TAMSULOSIN HYDROCHLORIDE 0.4 MILLIGRAM(S): 0.4 CAPSULE ORAL at 22:41

## 2019-06-02 RX ADMIN — TAMSULOSIN HYDROCHLORIDE 0.4 MILLIGRAM(S): 0.4 CAPSULE ORAL at 01:18

## 2019-06-02 RX ADMIN — Medication 4 UNIT(S): at 11:47

## 2019-06-02 RX ADMIN — ENOXAPARIN SODIUM 40 MILLIGRAM(S): 100 INJECTION SUBCUTANEOUS at 11:47

## 2019-06-02 RX ADMIN — Medication 600 MILLIGRAM(S): at 17:03

## 2019-06-02 RX ADMIN — Medication 3 MILLILITER(S): at 19:53

## 2019-06-02 RX ADMIN — ATORVASTATIN CALCIUM 40 MILLIGRAM(S): 80 TABLET, FILM COATED ORAL at 22:41

## 2019-06-02 RX ADMIN — OXYCODONE HYDROCHLORIDE 10 MILLIGRAM(S): 5 TABLET ORAL at 23:41

## 2019-06-02 RX ADMIN — OXYCODONE HYDROCHLORIDE 10 MILLIGRAM(S): 5 TABLET ORAL at 04:05

## 2019-06-02 RX ADMIN — Medication 100 MILLIGRAM(S): at 22:41

## 2019-06-02 RX ADMIN — ISOSORBIDE DINITRATE 5 MILLIGRAM(S): 5 TABLET ORAL at 05:49

## 2019-06-02 RX ADMIN — CHLORHEXIDINE GLUCONATE 1 APPLICATION(S): 213 SOLUTION TOPICAL at 01:57

## 2019-06-02 RX ADMIN — ISOSORBIDE DINITRATE 5 MILLIGRAM(S): 5 TABLET ORAL at 22:41

## 2019-06-02 RX ADMIN — Medication 4 UNIT(S): at 08:02

## 2019-06-02 RX ADMIN — Medication 12.5 MILLIGRAM(S): at 05:49

## 2019-06-02 RX ADMIN — Medication 100 MILLIGRAM(S): at 13:42

## 2019-06-02 RX ADMIN — Medication 3 MILLILITER(S): at 14:05

## 2019-06-02 RX ADMIN — FAMOTIDINE 20 MILLIGRAM(S): 10 INJECTION INTRAVENOUS at 17:04

## 2019-06-02 RX ADMIN — Medication 600 MILLIGRAM(S): at 05:49

## 2019-06-02 RX ADMIN — OXYCODONE HYDROCHLORIDE 5 MILLIGRAM(S): 5 TABLET ORAL at 00:45

## 2019-06-02 RX ADMIN — Medication 100 MILLIGRAM(S): at 05:49

## 2019-06-02 RX ADMIN — ISOSORBIDE DINITRATE 5 MILLIGRAM(S): 5 TABLET ORAL at 13:42

## 2019-06-02 RX ADMIN — Medication 3 MILLILITER(S): at 07:45

## 2019-06-02 RX ADMIN — OXYCODONE HYDROCHLORIDE 10 MILLIGRAM(S): 5 TABLET ORAL at 19:06

## 2019-06-02 RX ADMIN — Medication 1: at 08:02

## 2019-06-02 RX ADMIN — OXYCODONE HYDROCHLORIDE 10 MILLIGRAM(S): 5 TABLET ORAL at 18:36

## 2019-06-02 RX ADMIN — Medication 100 GRAM(S): at 17:03

## 2019-06-02 NOTE — PROGRESS NOTE ADULT - SUBJECTIVE AND OBJECTIVE BOX
OPERATIVE PROCEDURE(s):     cabg x 3/ radial           POD #2    SURGEON(s): andreina    SUBJECTIVE ASSESSMENT: pt is without complaints other than incisional chest pain that responds to pain meds.  The patient required high flow O2 due to hypoxia.  His CXR this AM was suggestive of a right pleural effusion; however there was no significant pleural fluid seen on the bedside US.    Vital Signs Last 24 Hrs  T(C): 37.1 (02 Jun 2019 12:00), Max: 37.1 (02 Jun 2019 12:00)  T(F): 98.7 (02 Jun 2019 12:00), Max: 98.7 (02 Jun 2019 12:00)  HR: 87 (02 Jun 2019 12:00) (80 - 96)  BP: 113/64 (02 Jun 2019 12:00) (105/56 - 148/72)  BP(mean): 83 (02 Jun 2019 12:00) (75 - 104)  RR: 21 (02 Jun 2019 12:00) (20 - 41)  SpO2: 95% (02 Jun 2019 12:00) (90% - 99%)  06-01-19 @ 07:01  -  06-02-19 @ 07:00  --------------------------------------------------------  IN: 1734 mL / OUT: 1335 mL / NET: 399 mL    06-02-19 @ 07:01  -  06-02-19 @ 12:51  --------------------------------------------------------  IN: 0 mL / OUT: 500 mL / NET: -500 mL    Physical Exam  General: alert and oriented x 3  Chest: cta bl  CVS:  s1s2  Abd: pos bs soft nt  GI/ :  Ext: left forearm- pos mild serous drainage from open incision; wound was cleaned and dsd was applied- not appearing infected; trace le edema  sternum intact    Central Venous Catheter: Yes[x ]  No[ ] , If Yes indication:           Day #    Valiente Catheter: Yes  [ ] , No [x ] : If yes indication:                         Day #    NGT: Yes [ ] No [x ]     If Yes Placement:                                          Day #    Post-Op Beta-Blockers: Yes [ x], No[ ], If No, then contraindication:    CHEST TUBE:  [ ] YES [ x] NO  OUTPUT:     per 24 hours    AIR LEAKS:  [ ] YES [ ] NO      EPICARDIAL WIRES:  [ x] YES [ ] NO      BOWEL MOVEMENT:  [ ] YES [x ] NO          LABS:                        10.4   11.16 )-----------( 130      ( 02 Jun 2019 01:00 )             31.6     COUMADIN:   [ ] YES [x ] NO    PT/INR - ( 02 Jun 2019 01:00 )   PT: 14.80 sec;   INR: 1.29 ratio         PTT - ( 02 Jun 2019 01:00 )  PTT:20.7 sec  06-02    140  |  101  |  21<H>  ----------------------------<  157<H>  4.4   |  27  |  1.0    Ca    8.9      02 Jun 2019 06:19  Mg     2.2     06-02    TPro  6.8  /  Alb  4.6  /  TBili  0.9  /  DBili  x   /  AST  26  /  ALT  29  /  AlkPhos  49  06-01        MEDICATIONS  (STANDING):  albumin human  5% IVPB 3000 milliLiter(s) IV Intermittent once  ALBUTerol/ipratropium for Nebulization 3 milliLiter(s) Nebulizer every 6 hours  aspirin enteric coated 325 milliGRAM(s) Oral daily  atorvastatin 40 milliGRAM(s) Oral at bedtime  chlorhexidine 4% Liquid 1 Application(s) Topical <User Schedule>  dextrose 5%. 1000 milliLiter(s) (50 mL/Hr) IV Continuous <Continuous>  dextrose 50% Injectable 25 milliLiter(s) IV Push every 15 minutes  docusate sodium 100 milliGRAM(s) Oral three times a day  enoxaparin Injectable 40 milliGRAM(s) SubCutaneous daily  famotidine    Tablet 20 milliGRAM(s) Oral two times a day  furosemide   Injectable 40 milliGRAM(s) IV Push daily  guaiFENesin  milliGRAM(s) Oral every 12 hours  insulin glargine Injectable (LANTUS) 30 Unit(s) SubCutaneous every morning  insulin lispro Injectable (HumaLOG) 4 Unit(s) SubCutaneous three times a day before meals  insulin regular Infusion 1 Unit(s)/Hr (1 mL/Hr) IV Continuous <Continuous>  isosorbide   dinitrate Tablet (ISORDIL) 5 milliGRAM(s) Oral three times a day  magnesium sulfate  IVPB 1 Gram(s) IV Intermittent every 12 hours  meperidine     Injectable 25 milliGRAM(s) IV Push once  metoprolol tartrate 12.5 milliGRAM(s) Oral two times a day  sodium chloride 0.9%. 1000 milliLiter(s) (20 mL/Hr) IV Continuous <Continuous>  tamsulosin 0.4 milliGRAM(s) Oral at bedtime    MEDICATIONS  (PRN):  dextrose 40% Gel 15 Gram(s) Oral once PRN Blood Glucose LESS THAN 70 milliGRAM(s)/deciliter  glucagon  Injectable 1 milliGRAM(s) IntraMuscular once PRN Glucose LESS THAN 70 milligrams/deciliter  ondansetron  IVPB 4 milliGRAM(s) IV Intermittent once PRN Nausea and/or Vomiting  oxyCODONE    IR 5 milliGRAM(s) Oral every 4 hours PRN Moderate Pain (4 - 6)  oxyCODONE    IR 10 milliGRAM(s) Oral every 4 hours PRN Severe Pain (7 - 10)      Allergies    No Known Allergies    Intolerances    Ambulation/Activity Status:  ambulated well with walker  RADIOLOGY & ADDITIONAL TESTS:  Diet, DASH/TLC:   Sodium & Cholesterol Restricted  Consistent Carbohydrate Evening Snack  1000mL Fluid Restriction ([id number]) (06-02-19 @ 09:13)    EXAM:  XR CHEST PORTABLE ROUTINE 1V            PROCEDURE DATE:  06/02/2019        INTERPRETATION:  Clinical History / Reason for exam: Shortness of breath    Comparison : Chest radiograph from the day prior.    Technique/Positioning: AP.    Findings:    Support devices: Right Internal jugular catheter sheath projecting over   SVC.    Cardiac/mediastinum/hilum: Stable cardiomegaly.    Lung parenchyma/Pleura: Bilateral basilar opacities and pulmonary vascular  congestion, stable.    Skeleton/soft tissues: Stable.    Impression:    Bilateral basilar opacities and pulmonary vascular congestion, stable in   light of different technique.      Assessment/Plan: Patient is a 52 yo male s/p cabg x 3 pod # 2  cont present tx as per ct surgeon  s/p cabg- cont asa, lopressor, and statin  encourage is   ambulation with pt  gentle diuresis  pain management  cont insulin for post op hyperglycemia    Social Service Disposition:

## 2019-06-02 NOTE — PROGRESS NOTE ADULT - SUBJECTIVE AND OBJECTIVE BOX
52 yo M w no known PMH who presented with a three-day history of chest discomfort.  He described the discomfort as mid-sternal and intermittent (lasting about 10 minutes at a time and up to two episodes per day).  It is associated with diaphoresis, radiation to the jaw, and an abnormal sensation in her arms ("like my arms are without blood").  He has been experiencing chest discomfort for the past several months but noticed that they have been occurring more frequently over the past 3 days. To note, he has not seen a medical doctor in many years. Hospital w/up with Coronary angio revealed multivessel disease    - POD # 2 CABG x3  - Post op with Acute hypoxic Resp failure    Vital Signs Last 24 Hrs  T(C): 36.9 (02 Jun 2019 08:00), Max: 37.2 (01 Jun 2019 12:00)  T(F): 98.5 (02 Jun 2019 08:00), Max: 99 (01 Jun 2019 12:00)  HR: 91 (02 Jun 2019 09:00) (78 - 96)  BP: 119/67 (02 Jun 2019 09:00) (105/56 - 148/72)  BP(mean): 86 (02 Jun 2019 09:00) (75 - 104)  RR: 26 (02 Jun 2019 09:00) (15 - 41)  SpO2: 91% (02 Jun 2019 09:00) (90% - 95%)    alert, awake, verbal  follows commands  right IJ cordis  S1S2 reg rate  very diminished air entry b/l, some rhonchi diffusely  clean sternal incision  soft abdomen, non tender non distended  warm periphery, clean left upper ext incision  right leg (harvest site) swollen, left leg no edema - warm b/l    WBC 11.2, hg 10.4, plt 130  INR 1.3  Cr 1, K 4.4    CXR - very poor inspiratory effort, bibasilar atelectasis with min effusion- confirmed with bedside US.    a/p:    52 yo M w no PMHx who was found to have unstable angina in setting of multivessel CAD now POD #2 CABG x3, EF 55%    Post operatively with Acute hypoxic resp failure likely in setting of pain/splinting resulting in atelectasis    lasix 40 mg IV qd, goal is negative fluid status. Fluid restrict to 1 liter per day. f/u lytes  PO , Statin 40, Metoprolol 12.5 bid, Imdur (for radial harvest) 5 mg TID  HF NC 50%, 50 LPM  PO diet, glycemic control - lantus 30, lispro 4  Pain control PRN  OOB to chair with IS q1 hr      35 minutes of critical care time spent providing medical care for patient's acute illness/conditions that impairs at least one vital organ system and/or poses a high risk of imminent or life threatening deterioration in the patient's condition. It includes time spent evaluating and treating the patient's acute illness as well as time spent reviewing labs, radiology, discussing goals of care with patient and/or patient's family, and discussing the case with a multidisciplinary team in an effort to prevent further life threatening deterioration or end organ damage. This time is independent of any procedures performed.

## 2019-06-02 NOTE — PHYSICAL THERAPY INITIAL EVALUATION ADULT - GENERAL OBSERVATIONS, REHAB EVAL
Pt encountered seated in b/s recliner chair with +tele, +O2 via high flow, +sequentials, +BP, +pulse ox, IV lock in NAD with covering RN Blanche present. Pt left same as +tele, +O2 via high flow, +sequentials, +BP, +pulse ox, IV lock in NAD with SAM Alves made aware and FAVIO Rolle present.

## 2019-06-03 LAB
ANION GAP SERPL CALC-SCNC: 12 MMOL/L — SIGNIFICANT CHANGE UP (ref 7–14)
BASOPHILS # BLD AUTO: 0.02 K/UL — SIGNIFICANT CHANGE UP (ref 0–0.2)
BASOPHILS NFR BLD AUTO: 0.2 % — SIGNIFICANT CHANGE UP (ref 0–1)
BUN SERPL-MCNC: 23 MG/DL — HIGH (ref 10–20)
CALCIUM SERPL-MCNC: 8.7 MG/DL — SIGNIFICANT CHANGE UP (ref 8.5–10.1)
CHLORIDE SERPL-SCNC: 101 MMOL/L — SIGNIFICANT CHANGE UP (ref 98–110)
CO2 SERPL-SCNC: 28 MMOL/L — SIGNIFICANT CHANGE UP (ref 17–32)
CREAT SERPL-MCNC: 0.9 MG/DL — SIGNIFICANT CHANGE UP (ref 0.7–1.5)
EOSINOPHIL # BLD AUTO: 0.08 K/UL — SIGNIFICANT CHANGE UP (ref 0–0.7)
EOSINOPHIL NFR BLD AUTO: 0.8 % — SIGNIFICANT CHANGE UP (ref 0–8)
GLUCOSE BLDC GLUCOMTR-MCNC: 119 MG/DL — HIGH (ref 70–99)
GLUCOSE BLDC GLUCOMTR-MCNC: 134 MG/DL — HIGH (ref 70–99)
GLUCOSE BLDC GLUCOMTR-MCNC: 136 MG/DL — HIGH (ref 70–99)
GLUCOSE BLDC GLUCOMTR-MCNC: 92 MG/DL — SIGNIFICANT CHANGE UP (ref 70–99)
GLUCOSE SERPL-MCNC: 105 MG/DL — HIGH (ref 70–99)
HCT VFR BLD CALC: 29.5 % — LOW (ref 42–52)
HGB BLD-MCNC: 9.7 G/DL — LOW (ref 14–18)
IMM GRANULOCYTES NFR BLD AUTO: 0.4 % — HIGH (ref 0.1–0.3)
LYMPHOCYTES # BLD AUTO: 1.57 K/UL — SIGNIFICANT CHANGE UP (ref 1.2–3.4)
LYMPHOCYTES # BLD AUTO: 16.6 % — LOW (ref 20.5–51.1)
MAGNESIUM SERPL-MCNC: 2.2 MG/DL — SIGNIFICANT CHANGE UP (ref 1.8–2.4)
MCHC RBC-ENTMCNC: 29.8 PG — SIGNIFICANT CHANGE UP (ref 27–31)
MCHC RBC-ENTMCNC: 32.9 G/DL — SIGNIFICANT CHANGE UP (ref 32–37)
MCV RBC AUTO: 90.8 FL — SIGNIFICANT CHANGE UP (ref 80–94)
MONOCYTES # BLD AUTO: 1.2 K/UL — HIGH (ref 0.1–0.6)
MONOCYTES NFR BLD AUTO: 12.7 % — HIGH (ref 1.7–9.3)
NEUTROPHILS # BLD AUTO: 6.54 K/UL — HIGH (ref 1.4–6.5)
NEUTROPHILS NFR BLD AUTO: 69.3 % — SIGNIFICANT CHANGE UP (ref 42.2–75.2)
NRBC # BLD: 0 /100 WBCS — SIGNIFICANT CHANGE UP (ref 0–0)
PLATELET # BLD AUTO: 131 K/UL — SIGNIFICANT CHANGE UP (ref 130–400)
POTASSIUM SERPL-MCNC: 3.8 MMOL/L — SIGNIFICANT CHANGE UP (ref 3.5–5)
POTASSIUM SERPL-SCNC: 3.8 MMOL/L — SIGNIFICANT CHANGE UP (ref 3.5–5)
RBC # BLD: 3.25 M/UL — LOW (ref 4.7–6.1)
RBC # FLD: 13.7 % — SIGNIFICANT CHANGE UP (ref 11.5–14.5)
SODIUM SERPL-SCNC: 141 MMOL/L — SIGNIFICANT CHANGE UP (ref 135–146)
WBC # BLD: 9.45 K/UL — SIGNIFICANT CHANGE UP (ref 4.8–10.8)
WBC # FLD AUTO: 9.45 K/UL — SIGNIFICANT CHANGE UP (ref 4.8–10.8)

## 2019-06-03 PROCEDURE — 99291 CRITICAL CARE FIRST HOUR: CPT

## 2019-06-03 PROCEDURE — 99024 POSTOP FOLLOW-UP VISIT: CPT

## 2019-06-03 PROCEDURE — 71045 X-RAY EXAM CHEST 1 VIEW: CPT | Mod: 26

## 2019-06-03 RX ORDER — POTASSIUM CHLORIDE 20 MEQ
20 PACKET (EA) ORAL DAILY
Refills: 0 | Status: DISCONTINUED | OUTPATIENT
Start: 2019-06-03 | End: 2019-06-03

## 2019-06-03 RX ORDER — SENNA PLUS 8.6 MG/1
1 TABLET ORAL ONCE
Refills: 0 | Status: COMPLETED | OUTPATIENT
Start: 2019-06-03 | End: 2019-06-03

## 2019-06-03 RX ORDER — SENNA PLUS 8.6 MG/1
2 TABLET ORAL AT BEDTIME
Refills: 0 | Status: DISCONTINUED | OUTPATIENT
Start: 2019-06-03 | End: 2019-06-07

## 2019-06-03 RX ORDER — FUROSEMIDE 40 MG
40 TABLET ORAL EVERY 12 HOURS
Refills: 0 | Status: DISCONTINUED | OUTPATIENT
Start: 2019-06-03 | End: 2019-06-04

## 2019-06-03 RX ORDER — KETOROLAC TROMETHAMINE 30 MG/ML
30 SYRINGE (ML) INJECTION EVERY 6 HOURS
Refills: 0 | Status: DISCONTINUED | OUTPATIENT
Start: 2019-06-03 | End: 2019-06-07

## 2019-06-03 RX ORDER — POTASSIUM CHLORIDE 20 MEQ
40 PACKET (EA) ORAL ONCE
Refills: 0 | Status: COMPLETED | OUTPATIENT
Start: 2019-06-03 | End: 2019-06-03

## 2019-06-03 RX ORDER — POTASSIUM CHLORIDE 20 MEQ
20 PACKET (EA) ORAL EVERY 12 HOURS
Refills: 0 | Status: DISCONTINUED | OUTPATIENT
Start: 2019-06-03 | End: 2019-06-04

## 2019-06-03 RX ADMIN — Medication 4 UNIT(S): at 11:19

## 2019-06-03 RX ADMIN — OXYCODONE HYDROCHLORIDE 10 MILLIGRAM(S): 5 TABLET ORAL at 01:26

## 2019-06-03 RX ADMIN — FAMOTIDINE 20 MILLIGRAM(S): 10 INJECTION INTRAVENOUS at 05:10

## 2019-06-03 RX ADMIN — CHLORHEXIDINE GLUCONATE 1 APPLICATION(S): 213 SOLUTION TOPICAL at 05:12

## 2019-06-03 RX ADMIN — Medication 30 MILLIGRAM(S): at 09:12

## 2019-06-03 RX ADMIN — Medication 100 GRAM(S): at 18:25

## 2019-06-03 RX ADMIN — Medication 100 MILLIGRAM(S): at 21:34

## 2019-06-03 RX ADMIN — Medication 4 UNIT(S): at 16:25

## 2019-06-03 RX ADMIN — INSULIN GLARGINE 30 UNIT(S): 100 INJECTION, SOLUTION SUBCUTANEOUS at 07:51

## 2019-06-03 RX ADMIN — ISOSORBIDE DINITRATE 5 MILLIGRAM(S): 5 TABLET ORAL at 13:31

## 2019-06-03 RX ADMIN — Medication 5 MILLIGRAM(S): at 18:24

## 2019-06-03 RX ADMIN — SENNA PLUS 2 TABLET(S): 8.6 TABLET ORAL at 21:34

## 2019-06-03 RX ADMIN — ISOSORBIDE DINITRATE 5 MILLIGRAM(S): 5 TABLET ORAL at 21:34

## 2019-06-03 RX ADMIN — ISOSORBIDE DINITRATE 5 MILLIGRAM(S): 5 TABLET ORAL at 05:10

## 2019-06-03 RX ADMIN — Medication 5 MILLIGRAM(S): at 10:06

## 2019-06-03 RX ADMIN — Medication 325 MILLIGRAM(S): at 11:57

## 2019-06-03 RX ADMIN — OXYCODONE HYDROCHLORIDE 10 MILLIGRAM(S): 5 TABLET ORAL at 22:01

## 2019-06-03 RX ADMIN — Medication 20 MILLIEQUIVALENT(S): at 18:24

## 2019-06-03 RX ADMIN — Medication 30 MILLIGRAM(S): at 09:42

## 2019-06-03 RX ADMIN — Medication 3 MILLILITER(S): at 08:09

## 2019-06-03 RX ADMIN — Medication 3 MILLILITER(S): at 13:06

## 2019-06-03 RX ADMIN — Medication 600 MILLIGRAM(S): at 18:24

## 2019-06-03 RX ADMIN — OXYCODONE HYDROCHLORIDE 10 MILLIGRAM(S): 5 TABLET ORAL at 11:55

## 2019-06-03 RX ADMIN — Medication 30 MILLILITER(S): at 06:58

## 2019-06-03 RX ADMIN — Medication 600 MILLIGRAM(S): at 05:10

## 2019-06-03 RX ADMIN — SENNA PLUS 1 TABLET(S): 8.6 TABLET ORAL at 09:07

## 2019-06-03 RX ADMIN — Medication 3 MILLILITER(S): at 20:27

## 2019-06-03 RX ADMIN — OXYCODONE HYDROCHLORIDE 10 MILLIGRAM(S): 5 TABLET ORAL at 12:30

## 2019-06-03 RX ADMIN — Medication 12.5 MILLIGRAM(S): at 18:24

## 2019-06-03 RX ADMIN — Medication 100 MILLIGRAM(S): at 05:10

## 2019-06-03 RX ADMIN — Medication 12.5 MILLIGRAM(S): at 05:10

## 2019-06-03 RX ADMIN — Medication 100 MILLIGRAM(S): at 13:31

## 2019-06-03 RX ADMIN — Medication 100 GRAM(S): at 05:10

## 2019-06-03 RX ADMIN — FAMOTIDINE 20 MILLIGRAM(S): 10 INJECTION INTRAVENOUS at 18:24

## 2019-06-03 RX ADMIN — Medication 40 MILLIGRAM(S): at 18:25

## 2019-06-03 RX ADMIN — ENOXAPARIN SODIUM 40 MILLIGRAM(S): 100 INJECTION SUBCUTANEOUS at 11:57

## 2019-06-03 RX ADMIN — TAMSULOSIN HYDROCHLORIDE 0.4 MILLIGRAM(S): 0.4 CAPSULE ORAL at 21:34

## 2019-06-03 RX ADMIN — Medication 4 UNIT(S): at 07:52

## 2019-06-03 RX ADMIN — OXYCODONE HYDROCHLORIDE 10 MILLIGRAM(S): 5 TABLET ORAL at 23:00

## 2019-06-03 RX ADMIN — Medication 40 MILLIEQUIVALENT(S): at 06:58

## 2019-06-03 RX ADMIN — Medication 40 MILLIGRAM(S): at 05:10

## 2019-06-03 RX ADMIN — ATORVASTATIN CALCIUM 40 MILLIGRAM(S): 80 TABLET, FILM COATED ORAL at 21:34

## 2019-06-03 NOTE — PROGRESS NOTE ADULT - SUBJECTIVE AND OBJECTIVE BOX
CTU Attending Progress Daily Note     03 Jun 2019 07:54  Admited 05-29-19, Hospital Day 5d  POD# -     HPI:  The patient is a 53-year-old male with no known PMH who presented with a three-day history of chest discomfort.  He described the discomfort as mid-sternal and intermittent (lasting about 10 minutes at a time and up to two episodes per day).  It is associated with diaphoresis, radiation to the jaw, and an abnormal sensation in her arms ("like my arms are without blood").  He has been experiencing chest discomfort for the past several months but noticed that they have been occurring more frequently over the past 3 days.  Otherwise, he denied having fever, chills, N/V/D, abdominal pain, diarrhea, or urinary complaints.  To note, he has not seen a medical doctor in many years. (29 May 2019 03:55)    Home Medications:    FAMILY HISTORY:  Family history of heart disease: Mother and father in their 60&#x27;s    PAST MEDICAL & SURGICAL HISTORY:  No pertinent past medical history  No significant past surgical history    Interval event for past 24 hr:  VESNA FERNÁNDEZ  53y had no event.   Current Complains:  VESNA FERNÁNDEZ has no new complains  Allergies    No Known Allergies    Intolerances      OBJECTIVE:  Vitals last 24 hrs  T(C): 37.3 (06-03-19 @ 00:00), Max: 37.3 (06-02-19 @ 15:00)  T(F): 99.1 (06-03-19 @ 00:00), Max: 99.1 (06-02-19 @ 15:00)  HR: 87 (06-03-19 @ 06:00) (77 - 97)  BP: 110/64 (06-03-19 @ 06:00) (106/63 - 129/73)  ABP: --  ABP(mean): --  RR: 23 (06-03-19 @ 06:00) (15 - 83)  SpO2: 92% (06-03-19 @ 06:00) (90% - 99%)  CVP(mm Hg): --  CI: --  PA: --  PA(direct): --  PCWP: --  SVR: --  PVR: --    06-02-19 @ 07:01  -  06-03-19 @ 07:00  --------------------------------------------------------  IN:    IV PiggyBack: 200 mL    Oral Fluid: 730 mL  Total IN: 930 mL    OUT:    Voided: 1800 mL  Total OUT: 1800 mL    Total NET: -870 mL          CAPILLARY BLOOD GLUCOSE      POCT Blood Glucose.: 134 mg/dL (03 Jun 2019 06:53)  POCT Blood Glucose.: 112 mg/dL (02 Jun 2019 22:30)  POCT Blood Glucose.: 138 mg/dL (02 Jun 2019 16:23)  POCT Blood Glucose.: 156 mg/dL (02 Jun 2019 11:29)  POCT Blood Glucose.: 186 mg/dL (02 Jun 2019 07:56)    LABS:                          9.7    9.45  )-----------( 131      ( 03 Jun 2019 01:20 )             29.5     Hemoglobin: 9.7 g/dL (06-03 @ 01:20)  Hemoglobin: 10.4 g/dL (06-02 @ 01:00)  Hemoglobin: 10.7 g/dL (06-01 @ 02:00)  Hemoglobin: 11.1 g/dL (05-31 @ 14:47)    06-03    141  |  101  |  23<H>  ----------------------------<  105<H>  3.8   |  28  |  0.9    Ca    8.7      03 Jun 2019 01:20  Mg     2.2     06-03      Creatinine, Serum: 0.9 mg/dL (06-03 @ 01:20)  Creatinine, Serum: 1.0 mg/dL (06-02 @ 06:19)  Creatinine, Serum: 1.0 mg/dL (06-01 @ 02:00)  Creatinine, Serum: 1.0 mg/dL (05-31 @ 14:47)  Creatinine, Serum: 1.1 mg/dL (05-31 @ 02:00)  Creatinine, Serum: 0.9 mg/dL (05-30 @ 17:00)    PT/INR - ( 02 Jun 2019 01:00 )   PT: 14.80 sec;   INR: 1.29 ratio         PTT - ( 02 Jun 2019 01:00 )  PTT:20.7 sec      HOSPITAL MEDICATIONS:  MEDICATIONS  (STANDING):  albumin human  5% IVPB 3000 milliLiter(s) IV Intermittent once  ALBUTerol/ipratropium for Nebulization 3 milliLiter(s) Nebulizer every 6 hours  aspirin enteric coated 325 milliGRAM(s) Oral daily  atorvastatin 40 milliGRAM(s) Oral at bedtime  chlorhexidine 4% Liquid 1 Application(s) Topical <User Schedule>  dextrose 5%. 1000 milliLiter(s) (50 mL/Hr) IV Continuous <Continuous>  dextrose 50% Injectable 25 milliLiter(s) IV Push every 15 minutes  docusate sodium 100 milliGRAM(s) Oral three times a day  enoxaparin Injectable 40 milliGRAM(s) SubCutaneous daily  famotidine    Tablet 20 milliGRAM(s) Oral two times a day  furosemide   Injectable 40 milliGRAM(s) IV Push daily  guaiFENesin  milliGRAM(s) Oral every 12 hours  insulin glargine Injectable (LANTUS) 30 Unit(s) SubCutaneous every morning  insulin lispro Injectable (HumaLOG) 4 Unit(s) SubCutaneous three times a day before meals  insulin regular Infusion 1 Unit(s)/Hr (1 mL/Hr) IV Continuous <Continuous>  isosorbide   dinitrate Tablet (ISORDIL) 5 milliGRAM(s) Oral three times a day  magnesium sulfate  IVPB 1 Gram(s) IV Intermittent every 12 hours  meperidine     Injectable 25 milliGRAM(s) IV Push once  metoprolol tartrate 12.5 milliGRAM(s) Oral two times a day  sodium chloride 0.9%. 1000 milliLiter(s) (20 mL/Hr) IV Continuous <Continuous>  tamsulosin 0.4 milliGRAM(s) Oral at bedtime    MEDICATIONS  (PRN):  aluminum hydroxide/magnesium hydroxide/simethicone Suspension 30 milliLiter(s) Oral every 6 hours PRN Dyspepsia  dextrose 40% Gel 15 Gram(s) Oral once PRN Blood Glucose LESS THAN 70 milliGRAM(s)/deciliter  glucagon  Injectable 1 milliGRAM(s) IntraMuscular once PRN Glucose LESS THAN 70 milligrams/deciliter  ondansetron  IVPB 4 milliGRAM(s) IV Intermittent once PRN Nausea and/or Vomiting  oxyCODONE    IR 5 milliGRAM(s) Oral every 4 hours PRN Moderate Pain (4 - 6)  oxyCODONE    IR 10 milliGRAM(s) Oral every 4 hours PRN Severe Pain (7 - 10)      REVIEW OF SYSTEMS:  CONSTITUTIONAL: [X] all negative; [ ] weakness, [ ] fevers, [ ] chills  EYES/ENT: [X] all negative; [ ] visual changes, [ ] vertigo, [ ] throat pain, [ ] eye pain  NECK: [X] all negative; [ ] pain, [ ] stiffness  RESPIRATORY: [ ] all negative, [ ] cough, [ ] wheezing, [ ] hemoptysis, [ ] shortness of breath  CARDIOVASCULAR: [ ] all negative; [ ] chest pain, [ ] palpitations, [ ] orthopnea  GASTROINTESTINAL: [X] all negative; [ ]abdominal pain, [ ] nausea, [ ] vomiting, [ ] hematemesis, [ ] diarrhea, [ ] constipation, [ ] melena, [ ] hematochezia.  GENITOURINARY: [X] all negative; [ ] dysuria, [ ] frequency, [ ] hematuria  NEUROLOGICAL: [X] all negative; [ ] numbness, [ ] weakness, [ ] paresthesias  MUSCULOSKELETAL: [X] all negative, [ ] joint pain, [ ] joint swelling, [ ] joint redness, [ ] bone pain  SKIN: [X] all negative; [ ] itching, [ ] burning, [ ] rashes, [ ] lesions   All other review of systems is negative unless indicated above.    [  ] Unable to assess ROS because     PHYSICAL EXAM:          CONSTITUTIONAL: Well-developed; well-nourished; in no acute distress.   	SKIN: warm, dry, no rashes or lesions  	HEENT: Atraumatic. Normocephalic. PERRL. Moist membranes, no conj injection, sclera clear  	NECK: Supple; non tender.  No JVD. No lymphadenopathy.  	CARD: Normal S1, S2. Rate and Rhythm are regular. No murmurs.  	RESP: Good air entry bilaterally, no wheezes, no rales no rhonchi.  	ABD: Soft, not tender, not distended, no CVA ttp no rebound no guarding, bowel sounds present  	EXT: Normal ROM.  No clubbing, no cyanosis, no pedal edema, no calf pain b/l, Peripheral pulses intact.  	LYMPH: No acute cervical adenopathy.  	NEURO: Alert, awake, motor 5/5 R, 5/5 L, sensation intact bilat, CN 2-12 intact,          PSYCH: Cooperative, appropriate. Alert & oriented x 3    RADIOLOGY:  X Reviewed and interpreted by me  CxR from 06-03-19 shows mild congestion, no pneumothorax, no effusion, no cardiomegaly,   ETT above guerrero in good position, NGT in place, TLC in place, S-G Catheter in place, Chest Tubes in place,     ECG:  X Reviewed and interpreted by me CTU Attending Progress Daily Note     03 Jun 2019 07:54  Admited 05-29-19, Hospital Day 5d  POD# - 3    HPI:  The patient is a 53-year-old male with no known PMH who presented with a three-day history of chest discomfort.  He described the discomfort as mid-sternal and intermittent (lasting about 10 minutes at a time and up to two episodes per day).  It is associated with diaphoresis, radiation to the jaw, and an abnormal sensation in her arms ("like my arms are without blood").  He has been experiencing chest discomfort for the past several months but noticed that they have been occurring more frequently over the past 3 days.  Otherwise, he denied having fever, chills, N/V/D, abdominal pain, diarrhea, or urinary complaints.  To note, he has not seen a medical doctor in many years. (29 May 2019 03:55)    Home Medications:    FAMILY HISTORY:  Family history of heart disease: Mother and father in their 60&#x27;s    PAST MEDICAL & SURGICAL HISTORY:  No pertinent past medical history  No significant past surgical history    Interval event for past 24 hr:  VESNA FERNÁNDEZ  53y had no event.   Current Complains:  VESNA FERNÁNDEZ has no new complains  Allergies    No Known Allergies    Intolerances      OBJECTIVE:  Vitals last 24 hrs  T(C): 37.3 (06-03-19 @ 00:00), Max: 37.3 (06-02-19 @ 15:00)  T(F): 99.1 (06-03-19 @ 00:00), Max: 99.1 (06-02-19 @ 15:00)  HR: 87 (06-03-19 @ 06:00) (77 - 97)  BP: 110/64 (06-03-19 @ 06:00) (106/63 - 129/73)  ABP: --  ABP(mean): --  RR: 23 (06-03-19 @ 06:00) (15 - 83)  SpO2: 92% (06-03-19 @ 06:00) (90% - 99%)      06-02-19 @ 07:01  -  06-03-19 @ 07:00  --------------------------------------------------------  IN:    IV PiggyBack: 200 mL    Oral Fluid: 730 mL  Total IN: 930 mL    OUT:    Voided: 1800 mL  Total OUT: 1800 mL    Total NET: -870 mL          CAPILLARY BLOOD GLUCOSE      POCT Blood Glucose.: 134 mg/dL (03 Jun 2019 06:53)  POCT Blood Glucose.: 112 mg/dL (02 Jun 2019 22:30)  POCT Blood Glucose.: 138 mg/dL (02 Jun 2019 16:23)  POCT Blood Glucose.: 156 mg/dL (02 Jun 2019 11:29)  POCT Blood Glucose.: 186 mg/dL (02 Jun 2019 07:56)    LABS:                          9.7    9.45  )-----------( 131      ( 03 Jun 2019 01:20 )             29.5     Hemoglobin: 9.7 g/dL (06-03 @ 01:20)  Hemoglobin: 10.4 g/dL (06-02 @ 01:00)  Hemoglobin: 10.7 g/dL (06-01 @ 02:00)  Hemoglobin: 11.1 g/dL (05-31 @ 14:47)    06-03    141  |  101  |  23<H>  ----------------------------<  105<H>  3.8   |  28  |  0.9    Ca    8.7      03 Jun 2019 01:20  Mg     2.2     06-03      Creatinine, Serum: 0.9 mg/dL (06-03 @ 01:20)  Creatinine, Serum: 1.0 mg/dL (06-02 @ 06:19)  Creatinine, Serum: 1.0 mg/dL (06-01 @ 02:00)  Creatinine, Serum: 1.0 mg/dL (05-31 @ 14:47)  Creatinine, Serum: 1.1 mg/dL (05-31 @ 02:00)  Creatinine, Serum: 0.9 mg/dL (05-30 @ 17:00)    PT/INR - ( 02 Jun 2019 01:00 )   PT: 14.80 sec;   INR: 1.29 ratio     PTT - ( 02 Jun 2019 01:00 )  PTT:20.7 sec      HOSPITAL MEDICATIONS:  MEDICATIONS  (STANDING):  albumin human  5% IVPB 3000 milliLiter(s) IV Intermittent once  ALBUTerol/ipratropium for Nebulization 3 milliLiter(s) Nebulizer every 6 hours  aspirin enteric coated 325 milliGRAM(s) Oral daily  atorvastatin 40 milliGRAM(s) Oral at bedtime  chlorhexidine 4% Liquid 1 Application(s) Topical <User Schedule>  dextrose 5%. 1000 milliLiter(s) (50 mL/Hr) IV Continuous <Continuous>  dextrose 50% Injectable 25 milliLiter(s) IV Push every 15 minutes  docusate sodium 100 milliGRAM(s) Oral three times a day  enoxaparin Injectable 40 milliGRAM(s) SubCutaneous daily  famotidine    Tablet 20 milliGRAM(s) Oral two times a day  furosemide   Injectable 40 milliGRAM(s) IV Push daily  guaiFENesin  milliGRAM(s) Oral every 12 hours  insulin glargine Injectable (LANTUS) 30 Unit(s) SubCutaneous every morning  insulin lispro Injectable (HumaLOG) 4 Unit(s) SubCutaneous three times a day before meals  insulin regular Infusion 1 Unit(s)/Hr (1 mL/Hr) IV Continuous <Continuous>  isosorbide   dinitrate Tablet (ISORDIL) 5 milliGRAM(s) Oral three times a day  magnesium sulfate  IVPB 1 Gram(s) IV Intermittent every 12 hours  meperidine     Injectable 25 milliGRAM(s) IV Push once  metoprolol tartrate 12.5 milliGRAM(s) Oral two times a day  sodium chloride 0.9%. 1000 milliLiter(s) (20 mL/Hr) IV Continuous <Continuous>  tamsulosin 0.4 milliGRAM(s) Oral at bedtime    MEDICATIONS  (PRN):  aluminum hydroxide/magnesium hydroxide/simethicone Suspension 30 milliLiter(s) Oral every 6 hours PRN Dyspepsia  dextrose 40% Gel 15 Gram(s) Oral once PRN Blood Glucose LESS THAN 70 milliGRAM(s)/deciliter  glucagon  Injectable 1 milliGRAM(s) IntraMuscular once PRN Glucose LESS THAN 70 milligrams/deciliter  ondansetron  IVPB 4 milliGRAM(s) IV Intermittent once PRN Nausea and/or Vomiting  oxyCODONE    IR 5 milliGRAM(s) Oral every 4 hours PRN Moderate Pain (4 - 6)  oxyCODONE    IR 10 milliGRAM(s) Oral every 4 hours PRN Severe Pain (7 - 10)      REVIEW OF SYSTEMS:  CONSTITUTIONAL: [X] all negative; [ ] weakness, [ ] fevers, [ ] chills  EYES/ENT: [X] all negative; [ ] visual changes, [ ] vertigo, [ ] throat pain, [ ] eye pain  NECK: [X] all negative; [ ] pain, [ ] stiffness  RESPIRATORY: [ ] all negative, [x] cough, [ ] wheezing, [ ] hemoptysis, [x ] shortness of breath  CARDIOVASCULAR: [ ] all negative; [x ] chest pain, [ ] palpitations, [ ] orthopnea  GASTROINTESTINAL: [X] all negative; [ ]abdominal pain, [ ] nausea, [ ] vomiting, [ ] hematemesis, [ ] diarrhea, [ ] constipation, [ ] melena, [ ] hematochezia.  GENITOURINARY: [X] all negative; [ ] dysuria, [ ] frequency, [ ] hematuria  NEUROLOGICAL: [X] all negative; [ ] numbness, [ ] weakness, [ ] paresthesias  MUSCULOSKELETAL: [X] all negative, [ ] joint pain, [ ] joint swelling, [ ] joint redness, [ ] bone pain  SKIN: [X] all negative; [ ] itching, [ ] burning, [ ] rashes, [ ] lesions   All other review of systems is negative unless indicated above.    [  ] Unable to assess ROS because     PHYSICAL EXAM:          CONSTITUTIONAL: Well-developed; well-nourished; in no acute distress.   	SKIN: warm, dry, no rashes or lesions  	HEENT: Atraumatic. Normocephalic. PERRL. Moist membranes, no conj injection, sclera clear  	NECK: Supple; non tender.  No JVD. No lymphadenopathy.  	CARD: Normal S1, S2. Rate and Rhythm are regular. No murmurs.  	RESP: poor air entry bilaterally, no wheezes, no rales no rhonchi.  	ABD: Soft, not tender, not distended, no CVA ttp no rebound no guarding, bowel sounds present  	EXT: Normal ROM.  No clubbing, no cyanosis, 1+ pedal edema, no calf pain b/l, Peripheral pulses intact.  	LYMPH: No acute cervical adenopathy.  	NEURO: Alert, awake, motor 5/5 R, 5/5 L, sensation intact bilat, CN 2-12 intact,          PSYCH: Cooperative, appropriate. Alert & oriented x 3    RADIOLOGY:  X Reviewed and interpreted by me  CxR from 06-03-19 shows mild congestion, no pneumothorax, no effusion, no cardiomegaly,     ECG:  X Reviewed and interpreted by me

## 2019-06-03 NOTE — PROGRESS NOTE ADULT - ASSESSMENT
PROBLEMS:      PLAN  Neuro: move all 4 extremities. no sensory or motor deficits  Pain management.     Pulm: Wean off supplemental oxygen as able. Daily CXR. Encourage coughing, deep breathing and use of incentive spirometry.   ALBUTerol/ipratropium for Nebulization 3 milliLiter(s) Nebulizer every 6 hours  guaiFENesin  milliGRAM(s) Oral every 12 hours    Cardio: Monitor telemetry/alarms. Continue supportive care   furosemide   Injectable 40 milliGRAM(s) IV Push daily  isosorbide   dinitrate Tablet (ISORDIL) 5 milliGRAM(s) Oral three times a day  metoprolol tartrate 12.5 milliGRAM(s) Oral two times a day  tamsulosin 0.4 milliGRAM(s) Oral at bedtime    GI: Continue stool softeners.    aluminum hydroxide/magnesium hydroxide/simethicone Suspension 30 milliLiter(s) Oral every 6 hours PRN  famotidine    Tablet 20 milliGRAM(s) Oral two times a day    Nutrition: Continue present diet  Endocrine and glucose control:   atorvastatin 40 milliGRAM(s) Oral at bedtime  dextrose 40% Gel 15 Gram(s) Oral once PRN  glucagon  Injectable 1 milliGRAM(s) IntraMuscular once PRN  insulin glargine Injectable (LANTUS) 30 Unit(s) SubCutaneous every morning  insulin lispro Injectable (HumaLOG) 4 Unit(s) SubCutaneous three times a day before meals    Renal: monitor urine output, supplement electrolytes as needed,     Vasc: Heparin SC and/or SCDs for DVT prophylaxis  enoxaparin Injectable 40 milliGRAM(s) SubCutaneous daily    ID: Stable, no fever , no chills. Off antibiotics.    Tubes: Monitor Chest tube output  Therapy: OOB/ambulate  Disposition: start planing discharge home or placement    Pertinent clinical, laboratory, radiographic, hemodynamic, echocardiographic, respiratory data, microbiologic data and chart were reviewed and analyzed frequently throughout the course of the day and night. GI and DVT prophylaxis, glycemic control, head of bed elevation and skin care issues were addressed.  Patient seen, examined and plan discussed with CT Surgery / CTICU team during rounds. PROBLEMS:  1.	CAD - s/p CABG with radial  2.	hypoxemia - on HiFlow oxygen, incentive spirometry  3.	fluid overload - lasix increase to 40 q 12 hr  4.	post sternotomy pain - add Toradol 30 q 6 prn  5.	constipation - dulcolax and senna     PLAN  Neuro: move all 4 extremities. no sensory or motor deficits  Pain management.     Pulm: Wean off supplemental oxygen as able. Daily CXR. Encourage coughing, deep breathing and use of incentive spirometry.   ALBUTerol/ipratropium for Nebulization 3 milliLiter(s) Nebulizer every 6 hours  guaiFENesin  milliGRAM(s) Oral every 12 hours    Cardio: Monitor telemetry/alarms. Continue supportive care   furosemide   Injectable 40 milliGRAM(s) IV Push increase to q 12 hr  isosorbide   dinitrate Tablet (ISORDIL) 5 milliGRAM(s) Oral three times a day  metoprolol tartrate 12.5 milliGRAM(s) Oral two times a day  tamsulosin 0.4 milliGRAM(s) Oral at bedtime    GI: Continue stool softeners.    aluminum hydroxide/magnesium hydroxide/simethicone Suspension 30 milliLiter(s) Oral every 6 hours PRN  famotidine    Tablet 20 milliGRAM(s) Oral two times a day    Nutrition: Continue present diet  Endocrine and glucose control:   atorvastatin 40 milliGRAM(s) Oral at bedtime  dextrose 40% Gel 15 Gram(s) Oral once PRN  glucagon  Injectable 1 milliGRAM(s) IntraMuscular once PRN  insulin glargine Injectable (LANTUS) 30 Unit(s) SubCutaneous every morning  insulin lispro Injectable (HumaLOG) 4 Unit(s) SubCutaneous three times a day before meals    Renal: monitor urine output, supplement electrolytes as needed,     Vasc: Heparin SC and/or SCDs for DVT prophylaxis  enoxaparin Injectable 40 milliGRAM(s) SubCutaneous daily    ID: Stable, no fever , no chills. Off antibiotics.    Therapy: OOB/ambulate  Disposition: start planing discharge home or placement    Pertinent clinical, laboratory, radiographic, hemodynamic, echocardiographic, respiratory data, microbiologic data and chart were reviewed and analyzed frequently throughout the course of the day and night. GI and DVT prophylaxis, glycemic control, head of bed elevation and skin care issues were addressed.  Patient seen, examined and plan discussed with CT Surgery / CTICU team during rounds.

## 2019-06-03 NOTE — PROGRESS NOTE ADULT - SUBJECTIVE AND OBJECTIVE BOX
OPERATIVE PROCEDURE(s):    cabg x 3            POD # 3    SURGEON(s): andreina      SUBJECTIVE ASSESSMENT: pt     Vital Signs Last 24 Hrs  T(C): 37.3 (03 Jun 2019 00:00), Max: 37.3 (02 Jun 2019 15:00)  T(F): 99.1 (03 Jun 2019 00:00), Max: 99.1 (02 Jun 2019 15:00)  HR: 80 (03 Jun 2019 08:00) (77 - 97)  BP: 108/70 (03 Jun 2019 08:00) (106/63 - 129/73)  BP(mean): 85 (03 Jun 2019 08:00) (77 - 94)  RR: 29 (03 Jun 2019 08:00) (15 - 83)  SpO2: 96% (03 Jun 2019 08:00) (91% - 99%)  06-02-19 @ 07:01  -  06-03-19 @ 07:00  --------------------------------------------------------  IN: 930 mL / OUT: 1800 mL / NET: -870 mL    06-03-19 @ 07:01  -  06-03-19 @ 08:48  --------------------------------------------------------  IN: 240 mL / OUT: 250 mL / NET: -10 mL        Physical Exam  General:  Chest:  CVS:  Abd:   GI/ :  Ext:    Central Venous Catheter: Yes[ ]  No[ ] , If Yes indication:           Day #    Valiente Catheter: Yes  [ ] , No [ ] : If yes indication:                         Day #    NGT: Yes [ ] No [  ]     If Yes Placement:                                          Day #    Post-Op Beta-Blockers: Yes [ ], No[ ], If No, then contraindication:    CHEST TUBE:  [ ] YES [ ] NO  OUTPUT:     per 24 hours    AIR LEAKS:  [ ] YES [ ] NO      EPICARDIAL WIRES:  [ ] YES [ ] NO      BOWEL MOVEMENT:  [ ] YES [ ] NO          LABS:                        9.7    9.45  )-----------( 131      ( 03 Jun 2019 01:20 )             29.5     COUMADIN:   [ ] YES [ ] NO    PT/INR - ( 02 Jun 2019 01:00 )   PT: 14.80 sec;   INR: 1.29 ratio         PTT - ( 02 Jun 2019 01:00 )  PTT:20.7 sec  06-03    141  |  101  |  23<H>  ----------------------------<  105<H>  3.8   |  28  |  0.9    Ca    8.7      03 Jun 2019 01:20  Mg     2.2     06-03          MEDICATIONS  (STANDING):  albumin human  5% IVPB 3000 milliLiter(s) IV Intermittent once  ALBUTerol/ipratropium for Nebulization 3 milliLiter(s) Nebulizer every 6 hours  aspirin enteric coated 325 milliGRAM(s) Oral daily  atorvastatin 40 milliGRAM(s) Oral at bedtime  chlorhexidine 4% Liquid 1 Application(s) Topical <User Schedule>  dextrose 5%. 1000 milliLiter(s) (50 mL/Hr) IV Continuous <Continuous>  dextrose 50% Injectable 25 milliLiter(s) IV Push every 15 minutes  docusate sodium 100 milliGRAM(s) Oral three times a day  enoxaparin Injectable 40 milliGRAM(s) SubCutaneous daily  famotidine    Tablet 20 milliGRAM(s) Oral two times a day  furosemide   Injectable 40 milliGRAM(s) IV Push daily  guaiFENesin  milliGRAM(s) Oral every 12 hours  insulin glargine Injectable (LANTUS) 30 Unit(s) SubCutaneous every morning  insulin lispro Injectable (HumaLOG) 4 Unit(s) SubCutaneous three times a day before meals  insulin regular Infusion 1 Unit(s)/Hr (1 mL/Hr) IV Continuous <Continuous>  isosorbide   dinitrate Tablet (ISORDIL) 5 milliGRAM(s) Oral three times a day  magnesium sulfate  IVPB 1 Gram(s) IV Intermittent every 12 hours  meperidine     Injectable 25 milliGRAM(s) IV Push once  metoprolol tartrate 12.5 milliGRAM(s) Oral two times a day  sodium chloride 0.9%. 1000 milliLiter(s) (20 mL/Hr) IV Continuous <Continuous>  tamsulosin 0.4 milliGRAM(s) Oral at bedtime    MEDICATIONS  (PRN):  aluminum hydroxide/magnesium hydroxide/simethicone Suspension 30 milliLiter(s) Oral every 6 hours PRN Dyspepsia  dextrose 40% Gel 15 Gram(s) Oral once PRN Blood Glucose LESS THAN 70 milliGRAM(s)/deciliter  glucagon  Injectable 1 milliGRAM(s) IntraMuscular once PRN Glucose LESS THAN 70 milligrams/deciliter  ondansetron  IVPB 4 milliGRAM(s) IV Intermittent once PRN Nausea and/or Vomiting  oxyCODONE    IR 5 milliGRAM(s) Oral every 4 hours PRN Moderate Pain (4 - 6)  oxyCODONE    IR 10 milliGRAM(s) Oral every 4 hours PRN Severe Pain (7 - 10)      Allergies    No Known Allergies    Intolerances        Ambulation/Activity Status:        RADIOLOGY & ADDITIONAL TESTS:  Diet, DASH/TLC:   Sodium & Cholesterol Restricted  Consistent Carbohydrate Evening Snack  1000mL Fluid Restriction ([id number]) (06-02-19 @ 09:13)        Assessment/Plan:    Social Service Disposition: OPERATIVE PROCEDURE(s):    cabg x 3            POD # 3    SURGEON(s): andreina    SUBJECTIVE ASSESSMENT: pt is complaining of incisional chest pain      Vital Signs Last 24 Hrs  T(C): 37.3 (03 Jun 2019 00:00), Max: 37.3 (02 Jun 2019 15:00)  T(F): 99.1 (03 Jun 2019 00:00), Max: 99.1 (02 Jun 2019 15:00)  HR: 80 (03 Jun 2019 08:00) (77 - 97)  BP: 108/70 (03 Jun 2019 08:00) (106/63 - 129/73)  BP(mean): 85 (03 Jun 2019 08:00) (77 - 94)  RR: 29 (03 Jun 2019 08:00) (15 - 83)  SpO2: 96% (03 Jun 2019 08:00) (91% - 99%)  06-02-19 @ 07:01  -  06-03-19 @ 07:00  --------------------------------------------------------  IN: 930 mL / OUT: 1800 mL / NET: -870 mL    06-03-19 @ 07:01  -  06-03-19 @ 08:48  --------------------------------------------------------  IN: 240 mL / OUT: 250 mL / NET: -10 mL      Physical Exam  General: alert and oriented x 3   Chest: cta bl  CVS: s1s2  Abd: pos bs soft nt  GI/ :  Ext: no sig edema;   incisions; c/d/i    Central Venous Catheter: Yes[ ]  No[ x , If Yes indication:           Day #    Valiente Catheter: Yes  [ ] , No [ x] : If yes indication:                         Day #    NGT: Yes [ ] No [x  ]     If Yes Placement:                                          Day #    Post-Op Beta-Blockers: Yes [ x], No[ ], If No, then contraindication:    CHEST TUBE:  [ ] YES [x ] NO  OUTPUT:     per 24 hours    AIR LEAKS:  [ ] YES [ ] NO      EPICARDIAL WIRES:  [ ] YES [x ] NO      BOWEL MOVEMENT:  [ ] YES [ ] NO          LABS:                        9.7    9.45  )-----------( 131      ( 03 Jun 2019 01:20 )             29.5     COUMADIN:   [ ] YES [x ] NO    PT/INR - ( 02 Jun 2019 01:00 )   PT: 14.80 sec;   INR: 1.29 ratio         PTT - ( 02 Jun 2019 01:00 )  PTT:20.7 sec  06-03    141  |  101  |  23<H>  ----------------------------<  105<H>  3.8   |  28  |  0.9    Ca    8.7      03 Jun 2019 01:20  Mg     2.2     06-03    MEDICATIONS  (STANDING):  albumin human  5% IVPB 3000 milliLiter(s) IV Intermittent once  ALBUTerol/ipratropium for Nebulization 3 milliLiter(s) Nebulizer every 6 hours  aspirin enteric coated 325 milliGRAM(s) Oral daily  atorvastatin 40 milliGRAM(s) Oral at bedtime  chlorhexidine 4% Liquid 1 Application(s) Topical <User Schedule>  dextrose 5%. 1000 milliLiter(s) (50 mL/Hr) IV Continuous <Continuous>  dextrose 50% Injectable 25 milliLiter(s) IV Push every 15 minutes  docusate sodium 100 milliGRAM(s) Oral three times a day  enoxaparin Injectable 40 milliGRAM(s) SubCutaneous daily  famotidine    Tablet 20 milliGRAM(s) Oral two times a day  furosemide   Injectable 40 milliGRAM(s) IV Push daily  guaiFENesin  milliGRAM(s) Oral every 12 hours  insulin glargine Injectable (LANTUS) 30 Unit(s) SubCutaneous every morning  insulin lispro Injectable (HumaLOG) 4 Unit(s) SubCutaneous three times a day before meals  insulin regular Infusion 1 Unit(s)/Hr (1 mL/Hr) IV Continuous <Continuous>  isosorbide   dinitrate Tablet (ISORDIL) 5 milliGRAM(s) Oral three times a day  magnesium sulfate  IVPB 1 Gram(s) IV Intermittent every 12 hours  meperidine     Injectable 25 milliGRAM(s) IV Push once  metoprolol tartrate 12.5 milliGRAM(s) Oral two times a day  sodium chloride 0.9%. 1000 milliLiter(s) (20 mL/Hr) IV Continuous <Continuous>  tamsulosin 0.4 milliGRAM(s) Oral at bedtime    MEDICATIONS  (PRN):  aluminum hydroxide/magnesium hydroxide/simethicone Suspension 30 milliLiter(s) Oral every 6 hours PRN Dyspepsia  dextrose 40% Gel 15 Gram(s) Oral once PRN Blood Glucose LESS THAN 70 milliGRAM(s)/deciliter  glucagon  Injectable 1 milliGRAM(s) IntraMuscular once PRN Glucose LESS THAN 70 milligrams/deciliter  ondansetron  IVPB 4 milliGRAM(s) IV Intermittent once PRN Nausea and/or Vomiting  oxyCODONE    IR 5 milliGRAM(s) Oral every 4 hours PRN Moderate Pain (4 - 6)  oxyCODONE    IR 10 milliGRAM(s) Oral every 4 hours PRN Severe Pain (7 - 10)      Allergies    No Known Allergies    Intolerances    Ambulation/Activity Status:  ambulated with pt    RADIOLOGY & ADDITIONAL TESTS:  Diet, DASH/TLC:   Sodium & Cholesterol Restricted  Consistent Carbohydrate Evening Snack  1000mL Fluid Restriction ([id number]) (06-02-19 @ 09:13)  EXAM:  XR CHEST PORTABLE ROUTINE 1V            PROCEDURE DATE:  06/02/2019        INTERPRETATION:  Clinical History / Reason for exam: Shortness of breath    Comparison : Chest radiograph from the day prior.    Technique/Positioning: AP.    Findings:    Support devices: Right Internal jugular catheter sheath projecting over   SVC.    Cardiac/mediastinum/hilum: Stable cardiomegaly.    Lung parenchyma/Pleura: Bilateral basilar opacities and pulmonary vascular  congestion, stable.    Skeleton/soft tissues: Stable.    Impression:    Bilateral basilar opacities and pulmonary vascular congestion, stable in   light of different technique.    Assessment/Plan:  Patient is a 52 yo male s/p cabg POD # 3  cont present tx as per ct surgeon  s/p cabg - cont asa, statin, and b blocker  pain management  encourage is  ambulation with assistance   wean oxygen  diuresis      Social Service Disposition:

## 2019-06-04 ENCOUNTER — TRANSCRIPTION ENCOUNTER (OUTPATIENT)
Age: 54
End: 2019-06-04

## 2019-06-04 LAB
ANION GAP SERPL CALC-SCNC: 13 MMOL/L — SIGNIFICANT CHANGE UP (ref 7–14)
BASE EXCESS BLDA CALC-SCNC: 6.3 MMOL/L — HIGH (ref -2–2)
BASOPHILS # BLD AUTO: 0.04 K/UL — SIGNIFICANT CHANGE UP (ref 0–0.2)
BASOPHILS NFR BLD AUTO: 0.5 % — SIGNIFICANT CHANGE UP (ref 0–1)
BUN SERPL-MCNC: 33 MG/DL — HIGH (ref 10–20)
CALCIUM SERPL-MCNC: 8.8 MG/DL — SIGNIFICANT CHANGE UP (ref 8.5–10.1)
CHLORIDE SERPL-SCNC: 97 MMOL/L — LOW (ref 98–110)
CO2 SERPL-SCNC: 28 MMOL/L — SIGNIFICANT CHANGE UP (ref 17–32)
CREAT SERPL-MCNC: 1 MG/DL — SIGNIFICANT CHANGE UP (ref 0.7–1.5)
EOSINOPHIL # BLD AUTO: 0.26 K/UL — SIGNIFICANT CHANGE UP (ref 0–0.7)
EOSINOPHIL NFR BLD AUTO: 3.3 % — SIGNIFICANT CHANGE UP (ref 0–8)
GAS PNL BLDA: SIGNIFICANT CHANGE UP
GLUCOSE BLDC GLUCOMTR-MCNC: 103 MG/DL — HIGH (ref 70–99)
GLUCOSE BLDC GLUCOMTR-MCNC: 104 MG/DL — HIGH (ref 70–99)
GLUCOSE BLDC GLUCOMTR-MCNC: 111 MG/DL — HIGH (ref 70–99)
GLUCOSE BLDC GLUCOMTR-MCNC: 166 MG/DL — HIGH (ref 70–99)
GLUCOSE SERPL-MCNC: 98 MG/DL — SIGNIFICANT CHANGE UP (ref 70–99)
HCO3 BLDA-SCNC: 30 MMOL/L — HIGH (ref 23–27)
HCT VFR BLD CALC: 32.1 % — LOW (ref 42–52)
HGB BLD-MCNC: 10.9 G/DL — LOW (ref 14–18)
HOROWITZ INDEX BLDA+IHG-RTO: 60 — SIGNIFICANT CHANGE UP
IMM GRANULOCYTES NFR BLD AUTO: 0.4 % — HIGH (ref 0.1–0.3)
LYMPHOCYTES # BLD AUTO: 1.65 K/UL — SIGNIFICANT CHANGE UP (ref 1.2–3.4)
LYMPHOCYTES # BLD AUTO: 20.9 % — SIGNIFICANT CHANGE UP (ref 20.5–51.1)
MAGNESIUM SERPL-MCNC: 2.4 MG/DL — SIGNIFICANT CHANGE UP (ref 1.8–2.4)
MCHC RBC-ENTMCNC: 30.4 PG — SIGNIFICANT CHANGE UP (ref 27–31)
MCHC RBC-ENTMCNC: 34 G/DL — SIGNIFICANT CHANGE UP (ref 32–37)
MCV RBC AUTO: 89.4 FL — SIGNIFICANT CHANGE UP (ref 80–94)
MONOCYTES # BLD AUTO: 0.97 K/UL — HIGH (ref 0.1–0.6)
MONOCYTES NFR BLD AUTO: 12.3 % — HIGH (ref 1.7–9.3)
NEUTROPHILS # BLD AUTO: 4.94 K/UL — SIGNIFICANT CHANGE UP (ref 1.4–6.5)
NEUTROPHILS NFR BLD AUTO: 62.6 % — SIGNIFICANT CHANGE UP (ref 42.2–75.2)
NRBC # BLD: 0 /100 WBCS — SIGNIFICANT CHANGE UP (ref 0–0)
PCO2 BLDA: 40 MMHG — SIGNIFICANT CHANGE UP (ref 38–42)
PH BLDA: 7.48 — HIGH (ref 7.38–7.42)
PLATELET # BLD AUTO: 180 K/UL — SIGNIFICANT CHANGE UP (ref 130–400)
PO2 BLDA: 49 MMHG — LOW (ref 78–95)
POTASSIUM SERPL-MCNC: 4.2 MMOL/L — SIGNIFICANT CHANGE UP (ref 3.5–5)
POTASSIUM SERPL-SCNC: 4.2 MMOL/L — SIGNIFICANT CHANGE UP (ref 3.5–5)
RBC # BLD: 3.59 M/UL — LOW (ref 4.7–6.1)
RBC # FLD: 13.5 % — SIGNIFICANT CHANGE UP (ref 11.5–14.5)
SAO2 % BLDA: 86 % — LOW (ref 94–98)
SODIUM SERPL-SCNC: 138 MMOL/L — SIGNIFICANT CHANGE UP (ref 135–146)
WBC # BLD: 7.89 K/UL — SIGNIFICANT CHANGE UP (ref 4.8–10.8)
WBC # FLD AUTO: 7.89 K/UL — SIGNIFICANT CHANGE UP (ref 4.8–10.8)

## 2019-06-04 PROCEDURE — 71045 X-RAY EXAM CHEST 1 VIEW: CPT | Mod: 26

## 2019-06-04 PROCEDURE — 99291 CRITICAL CARE FIRST HOUR: CPT

## 2019-06-04 PROCEDURE — 99024 POSTOP FOLLOW-UP VISIT: CPT

## 2019-06-04 RX ORDER — BUDESONIDE AND FORMOTEROL FUMARATE DIHYDRATE 160; 4.5 UG/1; UG/1
2 AEROSOL RESPIRATORY (INHALATION)
Refills: 0 | Status: DISCONTINUED | OUTPATIENT
Start: 2019-06-04 | End: 2019-06-07

## 2019-06-04 RX ORDER — ALBUMIN HUMAN 25 %
500 VIAL (ML) INTRAVENOUS ONCE
Refills: 0 | Status: COMPLETED | OUTPATIENT
Start: 2019-06-04 | End: 2019-06-04

## 2019-06-04 RX ORDER — LACTULOSE 10 G/15ML
30 SOLUTION ORAL ONCE
Refills: 0 | Status: DISCONTINUED | OUTPATIENT
Start: 2019-06-04 | End: 2019-06-05

## 2019-06-04 RX ADMIN — Medication 4 UNIT(S): at 17:00

## 2019-06-04 RX ADMIN — Medication 80 MILLIGRAM(S): at 21:54

## 2019-06-04 RX ADMIN — Medication 12.5 MILLIGRAM(S): at 05:14

## 2019-06-04 RX ADMIN — Medication 20 MILLIEQUIVALENT(S): at 05:14

## 2019-06-04 RX ADMIN — ISOSORBIDE DINITRATE 5 MILLIGRAM(S): 5 TABLET ORAL at 05:14

## 2019-06-04 RX ADMIN — BUDESONIDE AND FORMOTEROL FUMARATE DIHYDRATE 2 PUFF(S): 160; 4.5 AEROSOL RESPIRATORY (INHALATION) at 14:36

## 2019-06-04 RX ADMIN — Medication 40 MILLIGRAM(S): at 05:14

## 2019-06-04 RX ADMIN — Medication 600 MILLIGRAM(S): at 17:00

## 2019-06-04 RX ADMIN — Medication 12.5 MILLIGRAM(S): at 17:00

## 2019-06-04 RX ADMIN — Medication 100 GRAM(S): at 05:15

## 2019-06-04 RX ADMIN — Medication 5 MILLIGRAM(S): at 05:14

## 2019-06-04 RX ADMIN — Medication 100 MILLIGRAM(S): at 05:14

## 2019-06-04 RX ADMIN — TAMSULOSIN HYDROCHLORIDE 0.4 MILLIGRAM(S): 0.4 CAPSULE ORAL at 21:54

## 2019-06-04 RX ADMIN — CHLORHEXIDINE GLUCONATE 1 APPLICATION(S): 213 SOLUTION TOPICAL at 05:15

## 2019-06-04 RX ADMIN — OXYCODONE HYDROCHLORIDE 5 MILLIGRAM(S): 5 TABLET ORAL at 23:43

## 2019-06-04 RX ADMIN — Medication 3 MILLILITER(S): at 14:25

## 2019-06-04 RX ADMIN — ISOSORBIDE DINITRATE 5 MILLIGRAM(S): 5 TABLET ORAL at 14:36

## 2019-06-04 RX ADMIN — FAMOTIDINE 20 MILLIGRAM(S): 10 INJECTION INTRAVENOUS at 05:14

## 2019-06-04 RX ADMIN — Medication 80 MILLIGRAM(S): at 12:11

## 2019-06-04 RX ADMIN — Medication 3 MILLILITER(S): at 09:16

## 2019-06-04 RX ADMIN — Medication 4 UNIT(S): at 12:14

## 2019-06-04 RX ADMIN — Medication 325 MILLIGRAM(S): at 12:11

## 2019-06-04 RX ADMIN — FAMOTIDINE 20 MILLIGRAM(S): 10 INJECTION INTRAVENOUS at 17:00

## 2019-06-04 RX ADMIN — INSULIN GLARGINE 30 UNIT(S): 100 INJECTION, SOLUTION SUBCUTANEOUS at 09:07

## 2019-06-04 RX ADMIN — ATORVASTATIN CALCIUM 40 MILLIGRAM(S): 80 TABLET, FILM COATED ORAL at 21:54

## 2019-06-04 RX ADMIN — ENOXAPARIN SODIUM 40 MILLIGRAM(S): 100 INJECTION SUBCUTANEOUS at 12:11

## 2019-06-04 RX ADMIN — Medication 100 GRAM(S): at 17:00

## 2019-06-04 RX ADMIN — Medication 250 MILLILITER(S): at 06:43

## 2019-06-04 RX ADMIN — Medication 600 MILLIGRAM(S): at 05:14

## 2019-06-04 RX ADMIN — Medication 30 MILLIGRAM(S): at 10:45

## 2019-06-04 RX ADMIN — Medication 100 MILLIGRAM(S): at 14:36

## 2019-06-04 RX ADMIN — ISOSORBIDE DINITRATE 5 MILLIGRAM(S): 5 TABLET ORAL at 21:54

## 2019-06-04 RX ADMIN — Medication 3 MILLILITER(S): at 20:14

## 2019-06-04 NOTE — PROGRESS NOTE ADULT - ASSESSMENT
PROBLEMS:  1.	CAD - s/p CABG - ASA, lopressor, lipitor, isosorbide  2.	Syncopal episode - stop diuresis  3.	hypoxemia - respiratory Rx and symbicort will add 3 doses of solumedrol , insentive spirometry  4.	constipation - lactulose    PLAN  Neuro: move all 4 extremities. no sensory or motor deficits  Pain management.   ketorolac   Injectable 30 milliGRAM(s) IV Push every 6 hours PRN    Pulm: Wean off supplemental oxygen as able. Daily CXR. Encourage coughing, deep breathing and use of incentive spirometry.   ALBUTerol/ipratropium for Nebulization 3 milliLiter(s) Nebulizer every 6 hours  guaiFENesin  milliGRAM(s) Oral every 12 hours    Cardio: Monitor telemetry/alarms. Continue supportive care   isosorbide   dinitrate Tablet (ISORDIL) 5 milliGRAM(s) Oral three times a day  metoprolol tartrate 12.5 milliGRAM(s) Oral two times a day  tamsulosin 0.4 milliGRAM(s) Oral at bedtime    GI: Continue stool softeners.    aluminum hydroxide/magnesium hydroxide/simethicone Suspension 30 milliLiter(s) Oral every 6 hours PRN  famotidine    Tablet 20 milliGRAM(s) Oral two times a day  lactulose Syrup 30 Gram(s) Oral once  senna 2 Tablet(s) Oral at bedtime    Nutrition: Continue present diet  Endocrine and glucose control:   atorvastatin 40 milliGRAM(s) Oral at bedtime  dextrose 40% Gel 15 Gram(s) Oral once PRN  glucagon  Injectable 1 milliGRAM(s) IntraMuscular once PRN  insulin glargine Injectable (LANTUS) 30 Unit(s) SubCutaneous every morning  insulin lispro Injectable (HumaLOG) 4 Unit(s) SubCutaneous three times a day before meals    Renal: monitor urine output, supplement electrolytes as needed,     Vasc: Heparin SC and/or SCDs for DVT prophylaxis  enoxaparin Injectable 40 milliGRAM(s) SubCutaneous daily    ID: Stable, no fever , no chills. Off antibiotics.      Therapy: OOB/ambulate  Disposition: start planing discharge home or placement    Pertinent clinical, laboratory, radiographic, hemodynamic, echocardiographic, respiratory data, microbiologic data and chart were reviewed and analyzed frequently throughout the course of the day and night. GI and DVT prophylaxis, glycemic control, head of bed elevation and skin care issues were addressed.  Patient seen, examined and plan discussed with CT Surgery / CTICU team during rounds.

## 2019-06-04 NOTE — DIETITIAN INITIAL EVALUATION ADULT. - ENERGY NEEDS
calorie 1886-2265kcal (25-30kcal/kg IBW) for obesity  protein 75-90g (1-1.2g/kg IBW) for obesity  fluid per CTU team

## 2019-06-04 NOTE — DIETITIAN INITIAL EVALUATION ADULT. - DIET TYPE
consistent carbohydrate (evening snack)/DASH/TLC (sodium and cholesterol restricted diet)/% PO at meals

## 2019-06-04 NOTE — DIETITIAN INITIAL EVALUATION ADULT. - OTHER INFO
Initial assessment for LOS. P/w: chest discomfort. S/p CABG. POD #4. hypoxemia - respiratory Rx and symbicort will add 3 doses of solumedrol , incentive spirometry.

## 2019-06-04 NOTE — DISCHARGE NOTE NURSING/CASE MANAGEMENT/SOCIAL WORK - NSDCDPATPORTLINK_GEN_ALL_CORE
You can access the [facility] Portal, offered by [facility], by registering with the following website: [email or web address]

## 2019-06-04 NOTE — PROGRESS NOTE ADULT - SUBJECTIVE AND OBJECTIVE BOX
OPERATIVE PROCEDURE(s):  CABG              POD #  4                    53yMale  SURGEON(s): SIRIA Hunt  SUBJECTIVE ASSESSMENT:  Patient complaining of incisional chest pain  Vital Signs Last 24 Hrs  T(F): 98.3 (04 Jun 2019 06:30), Max: 99.5 (03 Jun 2019 16:00)  HR: 80 (04 Jun 2019 07:00) (67 - 86) SR  BP: 115/66 (04 Jun 2019 07:00) (84/47 - 146/84)  BP(mean): 86 (04 Jun 2019 07:00) (59 - 110)  RR: 14 (04 Jun 2019 07:00) (12 - 29)  SpO2: 97% (04 Jun 2019 07:00) (82% - 97%) Hi-Flow 60%/60L      I&O's Detail    03 Jun 2019 07:01  -  04 Jun 2019 07:00  --------------------------------------------------------  IN:    Albumin 5%  - 500 mL: 500 mL    IV PiggyBack: 100 mL    Oral Fluid: 890 mL  Total IN: 1490 mL    OUT:    Voided: 2950 mL  Total OUT: 2950 mL        Net:   I&O's Detail    02 Jun 2019 07:01  -  03 Jun 2019 07:00  --------------------------------------------------------  Total NET: -870 mL      03 Jun 2019 07:01  -  04 Jun 2019 07:00  --------------------------------------------------------  Total NET: -1460 mL        CAPILLARY BLOOD GLUCOSE      POCT Blood Glucose.: 103 mg/dL (04 Jun 2019 05:57)  POCT Blood Glucose.: 111 mg/dL (04 Jun 2019 00:38)  POCT Blood Glucose.: 92 mg/dL (03 Jun 2019 16:21)  POCT Blood Glucose.: 119 mg/dL (03 Jun 2019 11:14)  POCT Blood Glucose.: 136 mg/dL (03 Jun 2019 10:29)    Physical Exam:  General: NAD; A&Ox3  Cardiac: S1/S2, RRR, no murmur, no rubs  Lungs: unlabored respirations, CTA b/l, no wheeze, no rales, no crackles  Abdomen: Soft/NT/ND; positive bowel sounds x 4  Sternum: Intact, no click, incision healing well with no drainage  Incisions: Incisions clean/dry/intact  Extremities: No edema b/l lower extremities; good capillary refill; no cyanosis; palpable 1+ pedal pulses b/l    Central Venous Catheter: Yes[] No[x , If Yes indication:           Day #  Valiente Catheter: Yes  [] , No  [x , If yes indication:                      Day #  NGT: Yes [] No [x ,    If Yes Placement:                                     Day #  EPICARDIAL WIRES:  [x YES [] NO                                              Day #4  BOWEL MOVEMENT:  [] YES [x NO, If No, Timing since last BM:      Day #4  CHEST TUBE(Left/Right):  [] YES [x] NO, If yes -  AIR LEAKS:  [] YES [] NO        LABS:                          10.9<L>  7.89  )-----------( 180      ( 04 Jun 2019 02:00 )             32.1<L>                        9.7<L>  9.45  )-----------( 131      ( 03 Jun 2019 01:20 )             29.5<L>    06-04    138  |  97<L>  |  33<H>  ----------------------------<  98  4.2   |  28  |  1.0  06-03    141  |  101  |  23<H>  ----------------------------<  105<H>  3.8   |  28  |  0.9    Ca    8.8      04 Jun 2019 02:00  Mg     2.4     06-04          ABG - ( 04 Jun 2019 06:18 )  pH: 7.48  /  pCO2: 40    /  pO2: 49    / HCO3: 30    / Base Excess: 6.3   /  SaO2: 86    /  LA: 0.8        RADIOLOGY & ADDITIONAL TESTS:  CXR:    Impression:      Stable to slightly decreased bibasilar effusion/opacities.    EKG: < from: 12 Lead ECG (06.02.19 @ 13:56) >  Ventricular Rate 89 BPM    Atrial Rate 89 BPM    P-R Interval 162 ms    QRS Duration 88 ms    Q-T Interval 324 ms    QTC Calculation(Bezet) 394 ms    P Axis 50 degrees    R Axis 13 degrees    T Axis 47 degrees    Diagnosis Line Normal sinus rhythm  ST elevation, consider early repolarization, pericarditis, or injury  ST & T wave abnormality, consider lateral ischemia  Abnormal ECG    Confirmed by MARGOT HERNANDEZ MD ([id number]) on 6/3/2019 3:08:57 PM    < end of copied text >    MEDICATIONS  (STANDING):  ALBUTerol/ipratropium for Nebulization 3 milliLiter(s) Nebulizer every 6 hours  aspirin enteric coated 325 milliGRAM(s) Oral daily  atorvastatin 40 milliGRAM(s) Oral at bedtime  chlorhexidine 4% Liquid 1 Application(s) Topical <User Schedule>  dextrose 5%. 1000 milliLiter(s) (50 mL/Hr) IV Continuous <Continuous>  dextrose 50% Injectable 25 milliLiter(s) IV Push every 15 minutes  docusate sodium 100 milliGRAM(s) Oral three times a day  enoxaparin Injectable 40 milliGRAM(s) SubCutaneous daily  famotidine    Tablet 20 milliGRAM(s) Oral two times a day  furosemide   Injectable 40 milliGRAM(s) IV Push every 12 hours  guaiFENesin  milliGRAM(s) Oral every 12 hours  insulin glargine Injectable (LANTUS) 30 Unit(s) SubCutaneous every morning  insulin lispro Injectable (HumaLOG) 4 Unit(s) SubCutaneous three times a day before meals  insulin regular Infusion 1 Unit(s)/Hr (1 mL/Hr) IV Continuous <Continuous>  isosorbide   dinitrate Tablet (ISORDIL) 5 milliGRAM(s) Oral three times a day  magnesium sulfate  IVPB 1 Gram(s) IV Intermittent every 12 hours  metoprolol tartrate 12.5 milliGRAM(s) Oral two times a day  potassium chloride    Tablet ER 20 milliEquivalent(s) Oral every 12 hours  senna 2 Tablet(s) Oral at bedtime  sodium chloride 0.9%. 1000 milliLiter(s) (20 mL/Hr) IV Continuous <Continuous>  tamsulosin 0.4 milliGRAM(s) Oral at bedtime    MEDICATIONS  (PRN):  aluminum hydroxide/magnesium hydroxide/simethicone Suspension 30 milliLiter(s) Oral every 6 hours PRN Dyspepsia  dextrose 40% Gel 15 Gram(s) Oral once PRN Blood Glucose LESS THAN 70 milliGRAM(s)/deciliter  glucagon  Injectable 1 milliGRAM(s) IntraMuscular once PRN Glucose LESS THAN 70 milligrams/deciliter  ketorolac   Injectable 30 milliGRAM(s) IV Push every 6 hours PRN Moderate Pain (4 - 6)  oxyCODONE    IR 5 milliGRAM(s) Oral every 4 hours PRN Moderate Pain (4 - 6)  oxyCODONE    IR 10 milliGRAM(s) Oral every 4 hours PRN Severe Pain (7 - 10)    LOVENOX:[x] YES [] NO  Dose: 40mg Q24H  SCD's: YES b/l  GI Prophylaxis: Protonix [], Pepcid [x], None [], (Contra-indication:.....)    Post-Op Aspirin: Yes [x],  No [], If No, then contra-indication:  Post-Op Statin: Yes [x], No[], If No, then contra-indication:  Post-Op Beta-Blockers: Yes [x], No [], If No, then contra-indication:    Allergies:  No Known Allergies      Ambulation/Activity Status: Ambulates several times daily with assistance.    Assessment/Plan:  53y Male status-post CABG  - Case and plan discussed with CTU Intensivist and CT Surgeon - Dr. Cobian/Gilbert/Shivam   - Continue CTU supportive care    - Continue DVT/GI prophylaxis  - Incentive Spirometry 10 times an hour  - Continue to advance physical activity as tolerated and continue PT/OT as directed  1. CAD: Continue ASA, statin, BB  2. wean Hi-Flow as tolerated  3. cont ambulation as tolerated  4. constipation - lactulose

## 2019-06-04 NOTE — PROGRESS NOTE ADULT - SUBJECTIVE AND OBJECTIVE BOX
CTU Attending Progress Daily Note     04 Jun 2019 10:37  Admited 05-29-19, Hospital Day 6d  POD# - 4    HPI:  The patient is a 53-year-old male with no known PMH who presented with a three-day history of chest discomfort.  He described the discomfort as mid-sternal and intermittent (lasting about 10 minutes at a time and up to two episodes per day).  It is associated with diaphoresis, radiation to the jaw, and an abnormal sensation in her arms ("like my arms are without blood").  He has been experiencing chest discomfort for the past several months but noticed that they have been occurring more frequently over the past 3 days.  Otherwise, he denied having fever, chills, N/V/D, abdominal pain, diarrhea, or urinary complaints.  To note, he has not seen a medical doctor in many years. (29 May 2019 03:55)    Home Medications:    FAMILY HISTORY:  Family history of heart disease: Mother and father in their 60&#x27;s    PAST MEDICAL & SURGICAL HISTORY:  No pertinent past medical history  No significant past surgical history    Interval event for past 24 hr:  VESNA FERNÁNDEZ  53y had syncopal event this morning - most likely vaso-vagal.   Current Complains:  VESNA FERNÁNDEZ has no new complains  Allergies    No Known Allergies    Intolerances      OBJECTIVE:  Vitals last 24 hrs  T(C): 37.1 (06-04-19 @ 08:00), Max: 37.5 (06-03-19 @ 16:00)  T(F): 98.7 (06-04-19 @ 08:00), Max: 99.5 (06-03-19 @ 16:00)  HR: 71 (06-04-19 @ 08:00) (67 - 86)  BP: 109/61 (06-04-19 @ 08:00) (84/47 - 146/84)  ABP: --  ABP(mean): --  RR: 16 (06-04-19 @ 08:00) (12 - 27)  SpO2: 96% (06-04-19 @ 08:00) (82% - 97%)    06-03-19 @ 07:01  -  06-04-19 @ 07:00  --------------------------------------------------------  IN:    Albumin 5%  - 500 mL: 500 mL    IV PiggyBack: 100 mL    Oral Fluid: 890 mL  Total IN: 1490 mL    OUT:    Voided: 2950 mL  Total OUT: 2950 mL    Total NET: -1460 mL          CAPILLARY BLOOD GLUCOSE      POCT Blood Glucose.: 103 mg/dL (04 Jun 2019 05:57)  POCT Blood Glucose.: 111 mg/dL (04 Jun 2019 00:38)  POCT Blood Glucose.: 92 mg/dL (03 Jun 2019 16:21)  POCT Blood Glucose.: 119 mg/dL (03 Jun 2019 11:14)    LABS:  ABG - ( 04 Jun 2019 06:18 )  pH, Arterial: 7.48  pH, Blood: x     /  pCO2: 40    /  pO2: 49    / HCO3: 30    / Base Excess: 6.3   /  SaO2: 86              Blood Gas Arterial, Lactate: 0.8 mmoL/L (06-04-19 @ 06:18)                          10.9   7.89  )-----------( 180      ( 04 Jun 2019 02:00 )             32.1     Hemoglobin: 10.9 g/dL (06-04 @ 02:00)  Hemoglobin: 9.7 g/dL (06-03 @ 01:20)  Hemoglobin: 10.4 g/dL (06-02 @ 01:00)    06-04    138  |  97<L>  |  33<H>  ----------------------------<  98  4.2   |  28  |  1.0    Ca    8.8      04 Jun 2019 02:00  Mg     2.4     06-04      Creatinine, Serum: 1.0 mg/dL (06-04 @ 02:00)  Creatinine, Serum: 0.9 mg/dL (06-03 @ 01:20)  Creatinine, Serum: 1.0 mg/dL (06-02 @ 06:19)  Creatinine, Serum: 1.0 mg/dL (06-01 @ 02:00)  Creatinine, Serum: 1.0 mg/dL (05-31 @ 14:47)          HOSPITAL MEDICATIONS:  MEDICATIONS  (STANDING):  ALBUTerol/ipratropium for Nebulization 3 milliLiter(s) Nebulizer every 6 hours  aspirin enteric coated 325 milliGRAM(s) Oral daily  atorvastatin 40 milliGRAM(s) Oral at bedtime  chlorhexidine 4% Liquid 1 Application(s) Topical <User Schedule>  dextrose 5%. 1000 milliLiter(s) (50 mL/Hr) IV Continuous <Continuous>  dextrose 50% Injectable 25 milliLiter(s) IV Push every 15 minutes  docusate sodium 100 milliGRAM(s) Oral three times a day  enoxaparin Injectable 40 milliGRAM(s) SubCutaneous daily  famotidine    Tablet 20 milliGRAM(s) Oral two times a day  guaiFENesin  milliGRAM(s) Oral every 12 hours  insulin glargine Injectable (LANTUS) 30 Unit(s) SubCutaneous every morning  insulin lispro Injectable (HumaLOG) 4 Unit(s) SubCutaneous three times a day before meals  insulin regular Infusion 1 Unit(s)/Hr (1 mL/Hr) IV Continuous <Continuous>  isosorbide   dinitrate Tablet (ISORDIL) 5 milliGRAM(s) Oral three times a day  lactulose Syrup 30 Gram(s) Oral once  magnesium sulfate  IVPB 1 Gram(s) IV Intermittent every 12 hours  metoprolol tartrate 12.5 milliGRAM(s) Oral two times a day  potassium chloride    Tablet ER 20 milliEquivalent(s) Oral every 12 hours  senna 2 Tablet(s) Oral at bedtime  sodium chloride 0.9%. 1000 milliLiter(s) (20 mL/Hr) IV Continuous <Continuous>  tamsulosin 0.4 milliGRAM(s) Oral at bedtime    MEDICATIONS  (PRN):  aluminum hydroxide/magnesium hydroxide/simethicone Suspension 30 milliLiter(s) Oral every 6 hours PRN Dyspepsia  dextrose 40% Gel 15 Gram(s) Oral once PRN Blood Glucose LESS THAN 70 milliGRAM(s)/deciliter  glucagon  Injectable 1 milliGRAM(s) IntraMuscular once PRN Glucose LESS THAN 70 milligrams/deciliter  ketorolac   Injectable 30 milliGRAM(s) IV Push every 6 hours PRN Moderate Pain (4 - 6)  oxyCODONE    IR 5 milliGRAM(s) Oral every 4 hours PRN Moderate Pain (4 - 6)  oxyCODONE    IR 10 milliGRAM(s) Oral every 4 hours PRN Severe Pain (7 - 10)      REVIEW OF SYSTEMS:  CONSTITUTIONAL: [X] all negative; [ ] weakness, [ ] fevers, [ ] chills  EYES/ENT: [X] all negative; [ ] visual changes, [ ] vertigo, [ ] throat pain, [ ] eye pain  NECK: [X] all negative; [ ] pain, [ ] stiffness  RESPIRATORY: [ ] all negative, [x ] cough, [ ] wheezing, [ ] hemoptysis, [x ] shortness of breath  CARDIOVASCULAR: [ ] all negative; [x ] chest pain, [ ] palpitations, [ ] orthopnea  GASTROINTESTINAL: [X] all negative; [ ]abdominal pain, [ ] nausea, [ ] vomiting, [ ] hematemesis, [ ] diarrhea, [ ] constipation, [ ] melena, [ ] hematochezia.  GENITOURINARY: [X] all negative; [ ] dysuria, [ ] frequency, [ ] hematuria  NEUROLOGICAL: [X] all negative; [ ] numbness, [ ] weakness, [ ] paresthesias  MUSCULOSKELETAL: [X] all negative, [ ] joint pain, [ ] joint swelling, [ ] joint redness, [ ] bone pain  SKIN: [X] all negative; [ ] itching, [ ] burning, [ ] rashes, [ ] lesions   All other review of systems is negative unless indicated above.    [  ] Unable to assess ROS because     PHYSICAL EXAM:          CONSTITUTIONAL: Well-developed; well-nourished; in no acute distress.   	SKIN: warm, dry, no rashes or lesions  	HEENT: Atraumatic. Normocephalic. PERRL. Moist membranes, no conj injection, sclera clear  	NECK: Supple; non tender.  No JVD. No lymphadenopathy.  	CARD: Normal S1, S2. Rate and Rhythm are regular. No murmurs.  	RESP: Good air entry bilaterally, no wheezes, no rales no rhonchi.  	ABD: Soft, not tender, not distended, no CVA ttp no rebound no guarding, bowel sounds present  	EXT: Normal ROM.  No clubbing, no cyanosis, + pedal edema, no calf pain b/l, Peripheral pulses intact.  	LYMPH: No acute cervical adenopathy.  	NEURO: Alert, awake, motor 5/5 R, 5/5 L, sensation intact bilat, CN 2-12 intact,          PSYCH: Cooperative, appropriate. Alert & oriented x 3    RADIOLOGY:  X Reviewed and interpreted by me  CxR from 06-04-19 shows mild congestion, no pneumothorax, no effusion, no cardiomegaly,       ECG:  X Reviewed and interpreted by me - NSR

## 2019-06-05 LAB
ANION GAP SERPL CALC-SCNC: 15 MMOL/L — HIGH (ref 7–14)
BASOPHILS # BLD AUTO: 0.01 K/UL — SIGNIFICANT CHANGE UP (ref 0–0.2)
BASOPHILS NFR BLD AUTO: 0.1 % — SIGNIFICANT CHANGE UP (ref 0–1)
BUN SERPL-MCNC: 36 MG/DL — HIGH (ref 10–20)
CALCIUM SERPL-MCNC: 9.4 MG/DL — SIGNIFICANT CHANGE UP (ref 8.5–10.1)
CHLORIDE SERPL-SCNC: 101 MMOL/L — SIGNIFICANT CHANGE UP (ref 98–110)
CO2 SERPL-SCNC: 25 MMOL/L — SIGNIFICANT CHANGE UP (ref 17–32)
CREAT SERPL-MCNC: 1 MG/DL — SIGNIFICANT CHANGE UP (ref 0.7–1.5)
EOSINOPHIL # BLD AUTO: 0.01 K/UL — SIGNIFICANT CHANGE UP (ref 0–0.7)
EOSINOPHIL NFR BLD AUTO: 0.1 % — SIGNIFICANT CHANGE UP (ref 0–8)
GLUCOSE BLDC GLUCOMTR-MCNC: 105 MG/DL — HIGH (ref 70–99)
GLUCOSE BLDC GLUCOMTR-MCNC: 138 MG/DL — HIGH (ref 70–99)
GLUCOSE BLDC GLUCOMTR-MCNC: 171 MG/DL — HIGH (ref 70–99)
GLUCOSE BLDC GLUCOMTR-MCNC: 173 MG/DL — HIGH (ref 70–99)
GLUCOSE BLDC GLUCOMTR-MCNC: 186 MG/DL — HIGH (ref 70–99)
GLUCOSE SERPL-MCNC: 140 MG/DL — HIGH (ref 70–99)
HCT VFR BLD CALC: 33.1 % — LOW (ref 42–52)
HGB BLD-MCNC: 11 G/DL — LOW (ref 14–18)
IMM GRANULOCYTES NFR BLD AUTO: 0.7 % — HIGH (ref 0.1–0.3)
LYMPHOCYTES # BLD AUTO: 1.03 K/UL — LOW (ref 1.2–3.4)
LYMPHOCYTES # BLD AUTO: 12.2 % — LOW (ref 20.5–51.1)
MAGNESIUM SERPL-MCNC: 2.5 MG/DL — HIGH (ref 1.8–2.4)
MCHC RBC-ENTMCNC: 29.5 PG — SIGNIFICANT CHANGE UP (ref 27–31)
MCHC RBC-ENTMCNC: 33.2 G/DL — SIGNIFICANT CHANGE UP (ref 32–37)
MCV RBC AUTO: 88.7 FL — SIGNIFICANT CHANGE UP (ref 80–94)
MONOCYTES # BLD AUTO: 0.63 K/UL — HIGH (ref 0.1–0.6)
MONOCYTES NFR BLD AUTO: 7.5 % — SIGNIFICANT CHANGE UP (ref 1.7–9.3)
NEUTROPHILS # BLD AUTO: 6.71 K/UL — HIGH (ref 1.4–6.5)
NEUTROPHILS NFR BLD AUTO: 79.4 % — HIGH (ref 42.2–75.2)
NRBC # BLD: 0 /100 WBCS — SIGNIFICANT CHANGE UP (ref 0–0)
PLATELET # BLD AUTO: 237 K/UL — SIGNIFICANT CHANGE UP (ref 130–400)
POTASSIUM SERPL-MCNC: 4.8 MMOL/L — SIGNIFICANT CHANGE UP (ref 3.5–5)
POTASSIUM SERPL-SCNC: 4.8 MMOL/L — SIGNIFICANT CHANGE UP (ref 3.5–5)
RBC # BLD: 3.73 M/UL — LOW (ref 4.7–6.1)
RBC # FLD: 13.2 % — SIGNIFICANT CHANGE UP (ref 11.5–14.5)
SODIUM SERPL-SCNC: 141 MMOL/L — SIGNIFICANT CHANGE UP (ref 135–146)
WBC # BLD: 8.45 K/UL — SIGNIFICANT CHANGE UP (ref 4.8–10.8)
WBC # FLD AUTO: 8.45 K/UL — SIGNIFICANT CHANGE UP (ref 4.8–10.8)

## 2019-06-05 PROCEDURE — 99024 POSTOP FOLLOW-UP VISIT: CPT

## 2019-06-05 PROCEDURE — 99291 CRITICAL CARE FIRST HOUR: CPT

## 2019-06-05 PROCEDURE — 93970 EXTREMITY STUDY: CPT | Mod: 26

## 2019-06-05 RX ORDER — ISOSORBIDE DINITRATE 5 MG/1
5 TABLET ORAL EVERY 8 HOURS
Refills: 0 | Status: COMPLETED | OUTPATIENT
Start: 2019-06-05 | End: 2019-06-05

## 2019-06-05 RX ORDER — METOPROLOL TARTRATE 50 MG
12.5 TABLET ORAL ONCE
Refills: 0 | Status: COMPLETED | OUTPATIENT
Start: 2019-06-05 | End: 2019-06-05

## 2019-06-05 RX ORDER — ISOSORBIDE MONONITRATE 60 MG/1
30 TABLET, EXTENDED RELEASE ORAL DAILY
Refills: 0 | Status: DISCONTINUED | OUTPATIENT
Start: 2019-06-06 | End: 2019-06-07

## 2019-06-05 RX ORDER — METOPROLOL TARTRATE 50 MG
25 TABLET ORAL
Refills: 0 | Status: DISCONTINUED | OUTPATIENT
Start: 2019-06-05 | End: 2019-06-07

## 2019-06-05 RX ADMIN — FAMOTIDINE 20 MILLIGRAM(S): 10 INJECTION INTRAVENOUS at 05:17

## 2019-06-05 RX ADMIN — Medication 4 UNIT(S): at 07:15

## 2019-06-05 RX ADMIN — TAMSULOSIN HYDROCHLORIDE 0.4 MILLIGRAM(S): 0.4 CAPSULE ORAL at 21:46

## 2019-06-05 RX ADMIN — Medication 3 MILLILITER(S): at 08:56

## 2019-06-05 RX ADMIN — FAMOTIDINE 20 MILLIGRAM(S): 10 INJECTION INTRAVENOUS at 17:18

## 2019-06-05 RX ADMIN — Medication 100 GRAM(S): at 05:18

## 2019-06-05 RX ADMIN — ATORVASTATIN CALCIUM 40 MILLIGRAM(S): 80 TABLET, FILM COATED ORAL at 21:46

## 2019-06-05 RX ADMIN — Medication 600 MILLIGRAM(S): at 05:17

## 2019-06-05 RX ADMIN — Medication 25 MILLIGRAM(S): at 17:19

## 2019-06-05 RX ADMIN — Medication 12.5 MILLIGRAM(S): at 11:10

## 2019-06-05 RX ADMIN — Medication 325 MILLIGRAM(S): at 11:10

## 2019-06-05 RX ADMIN — Medication 100 MILLIGRAM(S): at 13:13

## 2019-06-05 RX ADMIN — Medication 3 MILLILITER(S): at 13:43

## 2019-06-05 RX ADMIN — Medication 100 GRAM(S): at 17:17

## 2019-06-05 RX ADMIN — ISOSORBIDE DINITRATE 5 MILLIGRAM(S): 5 TABLET ORAL at 05:17

## 2019-06-05 RX ADMIN — ISOSORBIDE DINITRATE 5 MILLIGRAM(S): 5 TABLET ORAL at 13:13

## 2019-06-05 RX ADMIN — Medication 4 UNIT(S): at 11:18

## 2019-06-05 RX ADMIN — ENOXAPARIN SODIUM 40 MILLIGRAM(S): 100 INJECTION SUBCUTANEOUS at 11:10

## 2019-06-05 RX ADMIN — Medication 4 UNIT(S): at 16:20

## 2019-06-05 RX ADMIN — ISOSORBIDE DINITRATE 5 MILLIGRAM(S): 5 TABLET ORAL at 21:46

## 2019-06-05 RX ADMIN — Medication 3 MILLILITER(S): at 20:30

## 2019-06-05 RX ADMIN — INSULIN GLARGINE 30 UNIT(S): 100 INJECTION, SOLUTION SUBCUTANEOUS at 07:15

## 2019-06-05 RX ADMIN — Medication 80 MILLIGRAM(S): at 05:17

## 2019-06-05 RX ADMIN — Medication 100 MILLIGRAM(S): at 21:46

## 2019-06-05 RX ADMIN — SENNA PLUS 2 TABLET(S): 8.6 TABLET ORAL at 21:47

## 2019-06-05 RX ADMIN — BUDESONIDE AND FORMOTEROL FUMARATE DIHYDRATE 2 PUFF(S): 160; 4.5 AEROSOL RESPIRATORY (INHALATION) at 20:13

## 2019-06-05 RX ADMIN — CHLORHEXIDINE GLUCONATE 1 APPLICATION(S): 213 SOLUTION TOPICAL at 05:17

## 2019-06-05 RX ADMIN — Medication 12.5 MILLIGRAM(S): at 05:17

## 2019-06-05 RX ADMIN — Medication 600 MILLIGRAM(S): at 17:18

## 2019-06-05 NOTE — PROGRESS NOTE ADULT - SUBJECTIVE AND OBJECTIVE BOX
OPERATIVE PROCEDURE(s):                POD #                       53yMale  SURGEON(s): SIRIA Hunt  SUBJECTIVE ASSESSMENT:   Vital Signs Last 24 Hrs  T(F): 98.3 (05 Jun 2019 04:00), Max: 98.7 (04 Jun 2019 08:00)  HR: 81 (05 Jun 2019 06:00) (70 - 95)  BP: 119/69 (05 Jun 2019 06:00) (99/55 - 139/84)  BP(mean): 88 (05 Jun 2019 06:00) (71 - 105)  ABP: --  ABP(mean): --  RR: 13 (05 Jun 2019 06:00) (5 - 37)  SpO2: 93% (05 Jun 2019 06:00) (91% - 99%)  CVP(mm Hg): --  CVP(cm H2O): --  CO: --  CI: --  PA: --  SVR: --    I&O's Detail    04 Jun 2019 07:01  -  05 Jun 2019 07:00  --------------------------------------------------------  IN:    IV PiggyBack: 100 mL    Oral Fluid: 300 mL  Total IN: 400 mL    OUT:    Voided: 2100 mL  Total OUT: 2100 mL        Net:   I&O's Detail    03 Jun 2019 07:01  -  04 Jun 2019 07:00  --------------------------------------------------------  Total NET: -1460 mL      04 Jun 2019 07:01  -  05 Jun 2019 07:00  --------------------------------------------------------  Total NET: -1700 mL        CAPILLARY BLOOD GLUCOSE      POCT Blood Glucose.: 186 mg/dL (05 Jun 2019 06:46)  POCT Blood Glucose.: 171 mg/dL (05 Jun 2019 00:13)  POCT Blood Glucose.: 166 mg/dL (04 Jun 2019 16:25)  POCT Blood Glucose.: 104 mg/dL (04 Jun 2019 11:32)    Physical Exam:  General: NAD; A&Ox3/Patient is intubated and sedated  Cardiac: S1/S2, RRR, no murmur, no rubs  Lungs: unlabored respirations, CTA b/l, no wheeze, no rales, no crackles  Abdomen: Soft/NT/ND; positive bowel sounds x 4  Sternum: Intact, no click, incision healing well with no drainage  Incisions: Incisions clean/dry/intact  Extremities: No edema b/l lower extremities; good capillary refill; no cyanosis; palpable 1+ pedal pulses b/l    Central Venous Catheter: Yes[]  No[] , If Yes indication:           Day #  Valiente Catheter: Yes  [] , No  [] , If yes indication:                      Day #  NGT: Yes [] No [] ,    If Yes Placement:                                     Day #  EPICARDIAL WIRES:  [] YES [] NO                                              Day #  BOWEL MOVEMENT:  [] YES [] NO, If No, Timing since last BM:      Day #  CHEST TUBE(Left/Right):  [] YES [] NO, If yes -  AIR LEAKS:  [] YES [] NO        LABS:                        11.0<L>  8.45  )-----------( 237      ( 05 Jun 2019 01:00 )             33.1<L>                        10.9<L>  7.89  )-----------( 180      ( 04 Jun 2019 02:00 )             32.1<L>    06-05    141  |  101  |  36<H>  ----------------------------<  140<H>  4.8   |  25  |  1.0  06-04    138  |  97<L>  |  33<H>  ----------------------------<  98  4.2   |  28  |  1.0    Ca    9.4      05 Jun 2019 01:00  Mg     2.5     06-05          ABG - ( 04 Jun 2019 06:18 )  pH: 7.48  /  pCO2: 40    /  pO2: 49    / HCO3: 30    / Base Excess: 6.3   /  SaO2: 86    /  LA: 0.8              RADIOLOGY & ADDITIONAL TESTS:  CXR:  EKG:  MEDICATIONS  (STANDING):  ALBUTerol/ipratropium for Nebulization 3 milliLiter(s) Nebulizer every 6 hours  aspirin enteric coated 325 milliGRAM(s) Oral daily  atorvastatin 40 milliGRAM(s) Oral at bedtime  buDESOnide 160 MICROgram(s)/formoterol 4.5 MICROgram(s) Inhaler 2 Puff(s) Inhalation two times a day  chlorhexidine 4% Liquid 1 Application(s) Topical <User Schedule>  dextrose 5%. 1000 milliLiter(s) (50 mL/Hr) IV Continuous <Continuous>  dextrose 50% Injectable 25 milliLiter(s) IV Push every 15 minutes  docusate sodium 100 milliGRAM(s) Oral three times a day  enoxaparin Injectable 40 milliGRAM(s) SubCutaneous daily  famotidine    Tablet 20 milliGRAM(s) Oral two times a day  guaiFENesin  milliGRAM(s) Oral every 12 hours  insulin glargine Injectable (LANTUS) 30 Unit(s) SubCutaneous every morning  insulin lispro Injectable (HumaLOG) 4 Unit(s) SubCutaneous three times a day before meals  insulin regular Infusion 1 Unit(s)/Hr (1 mL/Hr) IV Continuous <Continuous>  isosorbide   dinitrate Tablet (ISORDIL) 5 milliGRAM(s) Oral three times a day  lactulose Syrup 30 Gram(s) Oral once  magnesium sulfate  IVPB 1 Gram(s) IV Intermittent every 12 hours  metoprolol tartrate 12.5 milliGRAM(s) Oral two times a day  senna 2 Tablet(s) Oral at bedtime  sodium chloride 0.9%. 1000 milliLiter(s) (20 mL/Hr) IV Continuous <Continuous>  tamsulosin 0.4 milliGRAM(s) Oral at bedtime    MEDICATIONS  (PRN):  aluminum hydroxide/magnesium hydroxide/simethicone Suspension 30 milliLiter(s) Oral every 6 hours PRN Dyspepsia  dextrose 40% Gel 15 Gram(s) Oral once PRN Blood Glucose LESS THAN 70 milliGRAM(s)/deciliter  glucagon  Injectable 1 milliGRAM(s) IntraMuscular once PRN Glucose LESS THAN 70 milligrams/deciliter  ketorolac   Injectable 30 milliGRAM(s) IV Push every 6 hours PRN Moderate Pain (4 - 6)  oxyCODONE    IR 5 milliGRAM(s) Oral every 4 hours PRN Moderate Pain (4 - 6)  oxyCODONE    IR 10 milliGRAM(s) Oral every 4 hours PRN Severe Pain (7 - 10)    HEPARIN:  [] YES [] NO  Dose: XX UNITS/HR UNITS Q8H  LOVENOX:[] YES [] NO  Dose: XX mg Q24H  COUMADIN: []  YES [] NO  Dose: XX mg  Q24H  SCD's: YES b/l  GI Prophylaxis: Protonix [], Pepcid [], None [], (Contra-indication:.....)    Post-Op Beta-Blockers: Yes [], No[], If No, then contraindication:  Post-Op Aspirin: Yes [],  No [], If No, then contraindication:  Post-Op Statin: Yes [], No[], If No, then contraindication:  Allergies    No Known Allergies    Intolerances      Ambulation/Activity Status:    Assessment/Plan:  53y Male status-post .....  - Case and plan discussed with CTU Intensivist and CT Surgeon - Dr. Cobian/Gilbert/Shivam   - Continue CTU supportive care    - Continue DVT/GI prophylaxis  - Incentive Spirometry 10 times an hour  - Continue to advance physical activity as tolerated and continue PT/OT as directed  1. CAD: Continue ASA, statin, BB  2. HTN:   3. A. Fib:   4. COPD/Hypoxia:   5. DM/Glucose Control:     Social Service Disposition: OPERATIVE PROCEDURE(s):   CABGx3, Left radial harvest             POD #   5                    53yMale  SURGEON(s): SIRIA Hunt  SUBJECTIVE ASSESSMENT: pt seen and examined. no acute complaints. pt hypoxic this morning- however pt states he is asymptomatic  Vital Signs Last 24 Hrs  T(F): 98.3 (05 Jun 2019 04:00), Max: 98.7 (04 Jun 2019 08:00)  HR: 81 (05 Jun 2019 06:00) (70 - 95)  BP: 119/69 (05 Jun 2019 06:00) (99/55 - 139/84)  BP(mean): 88 (05 Jun 2019 06:00) (71 - 105)  RR: 13 (05 Jun 2019 06:00) (5 - 37)  SpO2: 93% (05 Jun 2019 06:00) (91% - 99%)      I&O's Detail    04 Jun 2019 07:01  -  05 Jun 2019 07:00  --------------------------------------------------------  IN:    IV PiggyBack: 100 mL    Oral Fluid: 300 mL  Total IN: 400 mL    OUT:    Voided: 2100 mL  Total OUT: 2100 mL        Net:   I&O's Detail    03 Jun 2019 07:01  -  04 Jun 2019 07:00  --------------------------------------------------------  Total NET: -1460 mL      04 Jun 2019 07:01  -  05 Jun 2019 07:00  --------------------------------------------------------  Total NET: -1700 mL        CAPILLARY BLOOD GLUCOSE      POCT Blood Glucose.: 186 mg/dL (05 Jun 2019 06:46)  POCT Blood Glucose.: 171 mg/dL (05 Jun 2019 00:13)  POCT Blood Glucose.: 166 mg/dL (04 Jun 2019 16:25)  POCT Blood Glucose.: 104 mg/dL (04 Jun 2019 11:32)    Physical Exam:  General: NAD; A&Ox3  Cardiac: S1/S2, RRR, no murmur, no rubs  Lungs: unlabored respirations, CTA b/l, no wheeze, no rales, no crackles  Abdomen: Soft/NT/ND; positive bowel sounds x 4  Sternum: Intact, no click, incision healing well with no drainage  Incisions: Incisions clean/dry/intact  Extremities: trace edema b/l lower extremities; good capillary refill; no cyanosis; palpable 1+ pedal pulses b/l    Central Venous Catheter: Yes[x]  No[] , If Yes indication:           Day #  Valiente Catheter: Yes  [] , No  [x] , If yes indication:                      Day #  NGT: Yes [] No [x] ,    If Yes Placement:                                     Day #  EPICARDIAL WIRES:  [x] YES [] NO                                              Day #  BOWEL MOVEMENT:  [x] YES [] NO, If No, Timing since last BM:      Day #  CHEST TUBE(Left/Right):  [] YES [x] NO, If yes -  AIR LEAKS:  [] YES [] NO        LABS:                        11.0<L>  8.45  )-----------( 237      ( 05 Jun 2019 01:00 )             33.1<L>                        10.9<L>  7.89  )-----------( 180      ( 04 Jun 2019 02:00 )             32.1<L>    06-05    141  |  101  |  36<H>  ----------------------------<  140<H>  4.8   |  25  |  1.0  06-04    138  |  97<L>  |  33<H>  ----------------------------<  98  4.2   |  28  |  1.0    Ca    9.4      05 Jun 2019 01:00  Mg     2.5     06-05          ABG - ( 04 Jun 2019 06:18 )  pH: 7.48  /  pCO2: 40    /  pO2: 49    / HCO3: 30    / Base Excess: 6.3   /  SaO2: 86    /  LA: 0.8              RADIOLOGY & ADDITIONAL TESTS:  CXR: < from: Xray Chest 1 View- PORTABLE-Routine (06.04.19 @ 05:18) >  Impression:      Stable bibasilar opacities/effusions.    < end of copied text >    EKG:   MEDICATIONS  (STANDING):  ALBUTerol/ipratropium for Nebulization 3 milliLiter(s) Nebulizer every 6 hours  aspirin enteric coated 325 milliGRAM(s) Oral daily  atorvastatin 40 milliGRAM(s) Oral at bedtime  buDESOnide 160 MICROgram(s)/formoterol 4.5 MICROgram(s) Inhaler 2 Puff(s) Inhalation two times a day  chlorhexidine 4% Liquid 1 Application(s) Topical <User Schedule>  dextrose 5%. 1000 milliLiter(s) (50 mL/Hr) IV Continuous <Continuous>  dextrose 50% Injectable 25 milliLiter(s) IV Push every 15 minutes  docusate sodium 100 milliGRAM(s) Oral three times a day  enoxaparin Injectable 40 milliGRAM(s) SubCutaneous daily  famotidine    Tablet 20 milliGRAM(s) Oral two times a day  guaiFENesin  milliGRAM(s) Oral every 12 hours  insulin glargine Injectable (LANTUS) 30 Unit(s) SubCutaneous every morning  insulin lispro Injectable (HumaLOG) 4 Unit(s) SubCutaneous three times a day before meals  insulin regular Infusion 1 Unit(s)/Hr (1 mL/Hr) IV Continuous <Continuous>  isosorbide   dinitrate Tablet (ISORDIL) 5 milliGRAM(s) Oral three times a day  lactulose Syrup 30 Gram(s) Oral once  magnesium sulfate  IVPB 1 Gram(s) IV Intermittent every 12 hours  metoprolol tartrate 12.5 milliGRAM(s) Oral two times a day  senna 2 Tablet(s) Oral at bedtime  sodium chloride 0.9%. 1000 milliLiter(s) (20 mL/Hr) IV Continuous <Continuous>  tamsulosin 0.4 milliGRAM(s) Oral at bedtime    MEDICATIONS  (PRN):  aluminum hydroxide/magnesium hydroxide/simethicone Suspension 30 milliLiter(s) Oral every 6 hours PRN Dyspepsia  dextrose 40% Gel 15 Gram(s) Oral once PRN Blood Glucose LESS THAN 70 milliGRAM(s)/deciliter  glucagon  Injectable 1 milliGRAM(s) IntraMuscular once PRN Glucose LESS THAN 70 milligrams/deciliter  ketorolac   Injectable 30 milliGRAM(s) IV Push every 6 hours PRN Moderate Pain (4 - 6)  oxyCODONE    IR 5 milliGRAM(s) Oral every 4 hours PRN Moderate Pain (4 - 6)  oxyCODONE    IR 10 milliGRAM(s) Oral every 4 hours PRN Severe Pain (7 - 10)    HEPARIN:  [] YES [x] NO  Dose: XX UNITS/HR UNITS Q8H  LOVENOX:[x] YES [] NO  Dose: 40 mg Q24H  COUMADIN: []  YES [x] NO  Dose: XX mg  Q24H  SCD's: YES b/l  GI Prophylaxis: Protonix [], Pepcid [x], None [], (Contra-indication:.....)    Post-Op Beta-Blockers: Yes [x], No[], If No, then contraindication:  Post-Op Aspirin: Yes [x],  No [], If No, then contraindication:  Post-Op Statin: Yes [x], No[], If No, then contraindication:  Allergies    No Known Allergies    Intolerances      Ambulation/Activity Status: ambulate    Assessment/Plan:  53y Male status-post CABGx3, left radial harvest POD#5  - Case and plan discussed with CTU Intensivist and CT Surgeon - Dr. Cobian/Gilbert/Shivam   - Continue CTU supportive care    - Continue DVT/GI prophylaxis  - Incentive Spirometry 10 times an hour  - Continue to advance physical activity as tolerated and continue PT/OT as directed  1. CAD: Continue ASA, statin, increase bb, cont isordil to prevent radial vasospam   2. HTN: increase lopressor  3. A. Fib prophylaxis: cont mag, cont lopressor   4. COPD/Hypoxia: cont nebs, wean o2, bipap as needed.   5. DM/Glucose Control: cont lantus 30, humalog 5 with sliding scale    Social Service Disposition: home

## 2019-06-05 NOTE — PROGRESS NOTE ADULT - SUBJECTIVE AND OBJECTIVE BOX
CTU Attending Progress Daily Note     05 Jun 2019 10:45  Admited 05-29-19, Hospital Day 7d  POD# - 5    HPI:  The patient is a 53-year-old male with no known PMH who presented with a three-day history of chest discomfort.  He described the discomfort as mid-sternal and intermittent (lasting about 10 minutes at a time and up to two episodes per day).  It is associated with diaphoresis, radiation to the jaw, and an abnormal sensation in her arms ("like my arms are without blood").  He has been experiencing chest discomfort for the past several months but noticed that they have been occurring more frequently over the past 3 days.  Otherwise, he denied having fever, chills, N/V/D, abdominal pain, diarrhea, or urinary complaints.  To note, he has not seen a medical doctor in many years. (29 May 2019 03:55)    Home Medications:    FAMILY HISTORY:  Family history of heart disease: Mother and father in their 60&#x27;s    PAST MEDICAL & SURGICAL HISTORY:  No pertinent past medical history  No significant past surgical history    Interval event for past 24 hr:  VESNA FERNÁNDEZ  53y had no event.   Current Complains:  VESNA FERNÁNDEZ has no new complains  Allergies    No Known Allergies    Intolerances      OBJECTIVE:  Vitals last 24 hrs  T(C): 36.8 (06-05-19 @ 08:00), Max: 37 (06-04-19 @ 20:00)  T(F): 98.2 (06-05-19 @ 08:00), Max: 98.6 (06-04-19 @ 20:00)  HR: 75 (06-05-19 @ 09:00) (70 - 95)  BP: 131/83 (06-05-19 @ 09:00) (119/68 - 139/84)  ABP: --  ABP(mean): --  RR: 14 (06-05-19 @ 08:00) (5 - 20)  SpO2: 93% (06-05-19 @ 09:00) (84% - 99%)      06-04-19 @ 07:01  -  06-05-19 @ 07:00  --------------------------------------------------------  IN:    IV PiggyBack: 100 mL    Oral Fluid: 300 mL  Total IN: 400 mL    OUT:    Voided: 2100 mL  Total OUT: 2100 mL    Total NET: -1700 mL          CAPILLARY BLOOD GLUCOSE      POCT Blood Glucose.: 186 mg/dL (05 Jun 2019 06:46)  POCT Blood Glucose.: 171 mg/dL (05 Jun 2019 00:13)  POCT Blood Glucose.: 166 mg/dL (04 Jun 2019 16:25)  POCT Blood Glucose.: 104 mg/dL (04 Jun 2019 11:32)    LABS:  ABG - ( 05 Jun 2019 08:09 )  pH, Arterial: 7.50  pH, Blood: x     /  pCO2: 35    /  pO2: 53    / HCO3: 27    / Base Excess: 4.0   /  SaO2: 88              Blood Gas Arterial, Lactate: 1.4 mmoL/L (06-05-19 @ 08:09)                          11.0   8.45  )-----------( 237      ( 05 Jun 2019 01:00 )             33.1     Hemoglobin: 11.0 g/dL (06-05 @ 01:00)  Hemoglobin: 10.9 g/dL (06-04 @ 02:00)  Hemoglobin: 9.7 g/dL (06-03 @ 01:20)    06-05    141  |  101  |  36<H>  ----------------------------<  140<H>  4.8   |  25  |  1.0    Ca    9.4      05 Jun 2019 01:00  Mg     2.5     06-05      Creatinine, Serum: 1.0 mg/dL (06-05 @ 01:00)  Creatinine, Serum: 1.0 mg/dL (06-04 @ 02:00)  Creatinine, Serum: 0.9 mg/dL (06-03 @ 01:20)  Creatinine, Serum: 1.0 mg/dL (06-02 @ 06:19)          HOSPITAL MEDICATIONS:  MEDICATIONS  (STANDING):  ALBUTerol/ipratropium for Nebulization 3 milliLiter(s) Nebulizer every 6 hours  aspirin enteric coated 325 milliGRAM(s) Oral daily  atorvastatin 40 milliGRAM(s) Oral at bedtime  buDESOnide 160 MICROgram(s)/formoterol 4.5 MICROgram(s) Inhaler 2 Puff(s) Inhalation two times a day  chlorhexidine 4% Liquid 1 Application(s) Topical <User Schedule>  dextrose 5%. 1000 milliLiter(s) (50 mL/Hr) IV Continuous <Continuous>  dextrose 50% Injectable 25 milliLiter(s) IV Push every 15 minutes  docusate sodium 100 milliGRAM(s) Oral three times a day  enoxaparin Injectable 40 milliGRAM(s) SubCutaneous daily  famotidine    Tablet 20 milliGRAM(s) Oral two times a day  guaiFENesin  milliGRAM(s) Oral every 12 hours  insulin glargine Injectable (LANTUS) 30 Unit(s) SubCutaneous every morning  insulin lispro Injectable (HumaLOG) 4 Unit(s) SubCutaneous three times a day before meals  isosorbide   dinitrate Tablet (ISORDIL) 5 milliGRAM(s) Oral every 8 hours  magnesium sulfate  IVPB 1 Gram(s) IV Intermittent every 12 hours  metoprolol tartrate 25 milliGRAM(s) Oral two times a day  metoprolol tartrate 12.5 milliGRAM(s) Oral once  senna 2 Tablet(s) Oral at bedtime  sodium chloride 0.9%. 1000 milliLiter(s) (20 mL/Hr) IV Continuous <Continuous>  tamsulosin 0.4 milliGRAM(s) Oral at bedtime    MEDICATIONS  (PRN):  aluminum hydroxide/magnesium hydroxide/simethicone Suspension 30 milliLiter(s) Oral every 6 hours PRN Dyspepsia  dextrose 40% Gel 15 Gram(s) Oral once PRN Blood Glucose LESS THAN 70 milliGRAM(s)/deciliter  glucagon  Injectable 1 milliGRAM(s) IntraMuscular once PRN Glucose LESS THAN 70 milligrams/deciliter  ketorolac   Injectable 30 milliGRAM(s) IV Push every 6 hours PRN Moderate Pain (4 - 6)  oxyCODONE    IR 5 milliGRAM(s) Oral every 4 hours PRN Moderate Pain (4 - 6)  oxyCODONE    IR 10 milliGRAM(s) Oral every 4 hours PRN Severe Pain (7 - 10)      REVIEW OF SYSTEMS:  CONSTITUTIONAL: [X] all negative; [ ] weakness, [ ] fevers, [ ] chills  EYES/ENT: [X] all negative; [ ] visual changes, [ ] vertigo, [ ] throat pain, [ ] eye pain  NECK: [X] all negative; [ ] pain, [ ] stiffness  RESPIRATORY: [ ] all negative, [x ] cough, [ ] wheezing, [ ] hemoptysis, [x ] shortness of breath  CARDIOVASCULAR: [ ] all negative; [x ] chest pain, [ ] palpitations, [ ] orthopnea  GASTROINTESTINAL: [X] all negative; [ ]abdominal pain, [ ] nausea, [ ] vomiting, [ ] hematemesis, [ ] diarrhea, [ ] constipation, [ ] melena, [ ] hematochezia.  GENITOURINARY: [X] all negative; [ ] dysuria, [ ] frequency, [ ] hematuria  NEUROLOGICAL: [X] all negative; [ ] numbness, [ ] weakness, [ ] paresthesias  MUSCULOSKELETAL: [X] all negative, [ ] joint pain, [ ] joint swelling, [ ] joint redness, [ ] bone pain  SKIN: [X] all negative; [ ] itching, [ ] burning, [ ] rashes, [ ] lesions   All other review of systems is negative unless indicated above.    [  ] Unable to assess ROS because     PHYSICAL EXAM:          CONSTITUTIONAL: Well-developed; well-nourished; in no acute distress.   	SKIN: warm, dry, no rashes or lesions  	HEENT: Atraumatic. Normocephalic. PERRL. Moist membranes, no conj injection, sclera clear  	NECK: Supple; non tender.  No JVD. No lymphadenopathy.  	CARD: Normal S1, S2. Rate and Rhythm are regular. No murmurs.  	RESP: Good air entry bilaterally, no wheezes, no rales no rhonchi.  	ABD: Soft, not tender, not distended, no CVA ttp no rebound no guarding, bowel sounds present  	EXT: Normal ROM.  No clubbing, no cyanosis, + pedal edema, no calf pain b/l, Peripheral pulses intact.  	LYMPH: No acute cervical adenopathy.  	NEURO: Alert, awake, motor 5/5 R, 5/5 L, sensation intact bilat, CN 2-12 intact,          PSYCH: Cooperative, appropriate. Alert & oriented x 3    RADIOLOGY:  X Reviewed and interpreted by me  CxR from 06-05-19 shows mild congestion, no pneumothorax, no effusion, no cardiomegaly,       ECG:  X Reviewed and interpreted by me - NSR

## 2019-06-05 NOTE — PROGRESS NOTE ADULT - ASSESSMENT
PROBLEMS:  1.	CAD - s/p CABG - lopressor increase to 25 q 12, ASA, Lipitor change isosorbide to Imdur 30  2.	hypoxemia - BiPAP 18/10 - 80% o2 , LE venous duplex, bronchodilators  3.	DM - on Lantus / Novolog     PLAN  Neuro: move all 4 extremities. no sensory or motor deficits  Pain management.   ketorolac   Injectable 30 milliGRAM(s) IV Push every 6 hours PRN    Pulm: Wean off supplemental oxygen as able. Daily CXR. Encourage coughing, deep breathing and use of incentive spirometry.   ALBUTerol/ipratropium for Nebulization 3 milliLiter(s) Nebulizer every 6 hours  buDESOnide 160 MICROgram(s)/formoterol 4.5 MICROgram(s) Inhaler 2 Puff(s) Inhalation two times a day  guaiFENesin  milliGRAM(s) Oral every 12 hours    Cardio: Monitor telemetry/alarms. Continue supportive care   isosorbide   dinitrate Tablet (ISORDIL) 5 milliGRAM(s) Oral every 8 hours  metoprolol tartrate 25 milliGRAM(s) Oral two times a day  metoprolol tartrate 12.5 milliGRAM(s) Oral once  tamsulosin 0.4 milliGRAM(s) Oral at bedtime    GI: Continue stool softeners.    aluminum hydroxide/magnesium hydroxide/simethicone Suspension 30 milliLiter(s) Oral every 6 hours PRN  famotidine    Tablet 20 milliGRAM(s) Oral two times a day  senna 2 Tablet(s) Oral at bedtime    Nutrition: Continue present diet  Endocrine and glucose control:   atorvastatin 40 milliGRAM(s) Oral at bedtime  dextrose 40% Gel 15 Gram(s) Oral once PRN  glucagon  Injectable 1 milliGRAM(s) IntraMuscular once PRN  insulin glargine Injectable (LANTUS) 30 Unit(s) SubCutaneous every morning  insulin lispro Injectable (HumaLOG) 4 Unit(s) SubCutaneous three times a day before meals    Renal: monitor urine output, supplement electrolytes as needed,     Vasc: Heparin SC and/or SCDs for DVT prophylaxis  enoxaparin Injectable 40 milliGRAM(s) SubCutaneous daily    ID: Stable, no fever , no chills. Off antibiotics.      Therapy: OOB/ambulate  Disposition: start planing discharge home or placement    Pertinent clinical, laboratory, radiographic, hemodynamic, echocardiographic, respiratory data, microbiologic data and chart were reviewed and analyzed frequently throughout the course of the day and night. GI and DVT prophylaxis, glycemic control, head of bed elevation and skin care issues were addressed.  Patient seen, examined and plan discussed with CT Surgery / CTICU team during rounds.

## 2019-06-06 LAB
ANION GAP SERPL CALC-SCNC: 14 MMOL/L — SIGNIFICANT CHANGE UP (ref 7–14)
BASOPHILS # BLD AUTO: 0.03 K/UL — SIGNIFICANT CHANGE UP (ref 0–0.2)
BASOPHILS NFR BLD AUTO: 0.2 % — SIGNIFICANT CHANGE UP (ref 0–1)
BUN SERPL-MCNC: 33 MG/DL — HIGH (ref 10–20)
CALCIUM SERPL-MCNC: 9.6 MG/DL — SIGNIFICANT CHANGE UP (ref 8.5–10.1)
CHLORIDE SERPL-SCNC: 101 MMOL/L — SIGNIFICANT CHANGE UP (ref 98–110)
CO2 SERPL-SCNC: 28 MMOL/L — SIGNIFICANT CHANGE UP (ref 17–32)
CREAT SERPL-MCNC: 1 MG/DL — SIGNIFICANT CHANGE UP (ref 0.7–1.5)
EOSINOPHIL # BLD AUTO: 0.14 K/UL — SIGNIFICANT CHANGE UP (ref 0–0.7)
EOSINOPHIL NFR BLD AUTO: 1.2 % — SIGNIFICANT CHANGE UP (ref 0–8)
GLUCOSE BLDC GLUCOMTR-MCNC: 101 MG/DL — HIGH (ref 70–99)
GLUCOSE BLDC GLUCOMTR-MCNC: 105 MG/DL — HIGH (ref 70–99)
GLUCOSE BLDC GLUCOMTR-MCNC: 77 MG/DL — SIGNIFICANT CHANGE UP (ref 70–99)
GLUCOSE BLDC GLUCOMTR-MCNC: 85 MG/DL — SIGNIFICANT CHANGE UP (ref 70–99)
GLUCOSE SERPL-MCNC: 100 MG/DL — HIGH (ref 70–99)
HCT VFR BLD CALC: 36.4 % — LOW (ref 42–52)
HGB BLD-MCNC: 12.1 G/DL — LOW (ref 14–18)
IMM GRANULOCYTES NFR BLD AUTO: 0.5 % — HIGH (ref 0.1–0.3)
LYMPHOCYTES # BLD AUTO: 2.93 K/UL — SIGNIFICANT CHANGE UP (ref 1.2–3.4)
LYMPHOCYTES # BLD AUTO: 24.2 % — SIGNIFICANT CHANGE UP (ref 20.5–51.1)
MAGNESIUM SERPL-MCNC: 2.4 MG/DL — SIGNIFICANT CHANGE UP (ref 1.8–2.4)
MCHC RBC-ENTMCNC: 30 PG — SIGNIFICANT CHANGE UP (ref 27–31)
MCHC RBC-ENTMCNC: 33.2 G/DL — SIGNIFICANT CHANGE UP (ref 32–37)
MCV RBC AUTO: 90.1 FL — SIGNIFICANT CHANGE UP (ref 80–94)
MONOCYTES # BLD AUTO: 1.24 K/UL — HIGH (ref 0.1–0.6)
MONOCYTES NFR BLD AUTO: 10.2 % — HIGH (ref 1.7–9.3)
NEUTROPHILS # BLD AUTO: 7.7 K/UL — HIGH (ref 1.4–6.5)
NEUTROPHILS NFR BLD AUTO: 63.7 % — SIGNIFICANT CHANGE UP (ref 42.2–75.2)
NRBC # BLD: 0 /100 WBCS — SIGNIFICANT CHANGE UP (ref 0–0)
PLATELET # BLD AUTO: 280 K/UL — SIGNIFICANT CHANGE UP (ref 130–400)
POTASSIUM SERPL-MCNC: 4.7 MMOL/L — SIGNIFICANT CHANGE UP (ref 3.5–5)
POTASSIUM SERPL-SCNC: 4.7 MMOL/L — SIGNIFICANT CHANGE UP (ref 3.5–5)
RBC # BLD: 4.04 M/UL — LOW (ref 4.7–6.1)
RBC # FLD: 13.3 % — SIGNIFICANT CHANGE UP (ref 11.5–14.5)
SODIUM SERPL-SCNC: 143 MMOL/L — SIGNIFICANT CHANGE UP (ref 135–146)
WBC # BLD: 12.1 K/UL — HIGH (ref 4.8–10.8)
WBC # FLD AUTO: 12.1 K/UL — HIGH (ref 4.8–10.8)

## 2019-06-06 PROCEDURE — 99233 SBSQ HOSP IP/OBS HIGH 50: CPT

## 2019-06-06 PROCEDURE — 71046 X-RAY EXAM CHEST 2 VIEWS: CPT | Mod: 26

## 2019-06-06 RX ORDER — TEMAZEPAM 15 MG/1
15 CAPSULE ORAL ONCE
Refills: 0 | Status: DISCONTINUED | OUTPATIENT
Start: 2019-06-06 | End: 2019-06-06

## 2019-06-06 RX ORDER — METFORMIN HYDROCHLORIDE 850 MG/1
500 TABLET ORAL EVERY 12 HOURS
Refills: 0 | Status: DISCONTINUED | OUTPATIENT
Start: 2019-06-07 | End: 2019-06-07

## 2019-06-06 RX ORDER — FUROSEMIDE 40 MG
40 TABLET ORAL ONCE
Refills: 0 | Status: COMPLETED | OUTPATIENT
Start: 2019-06-06 | End: 2019-06-06

## 2019-06-06 RX ORDER — INSULIN LISPRO 100/ML
VIAL (ML) SUBCUTANEOUS
Refills: 0 | Status: DISCONTINUED | OUTPATIENT
Start: 2019-06-06 | End: 2019-06-07

## 2019-06-06 RX ADMIN — ISOSORBIDE MONONITRATE 30 MILLIGRAM(S): 60 TABLET, EXTENDED RELEASE ORAL at 11:41

## 2019-06-06 RX ADMIN — SENNA PLUS 2 TABLET(S): 8.6 TABLET ORAL at 21:44

## 2019-06-06 RX ADMIN — Medication 600 MILLIGRAM(S): at 17:55

## 2019-06-06 RX ADMIN — TAMSULOSIN HYDROCHLORIDE 0.4 MILLIGRAM(S): 0.4 CAPSULE ORAL at 21:44

## 2019-06-06 RX ADMIN — TEMAZEPAM 15 MILLIGRAM(S): 15 CAPSULE ORAL at 23:20

## 2019-06-06 RX ADMIN — OXYCODONE HYDROCHLORIDE 5 MILLIGRAM(S): 5 TABLET ORAL at 01:00

## 2019-06-06 RX ADMIN — Medication 100 GRAM(S): at 17:56

## 2019-06-06 RX ADMIN — ATORVASTATIN CALCIUM 40 MILLIGRAM(S): 80 TABLET, FILM COATED ORAL at 21:44

## 2019-06-06 RX ADMIN — Medication 100 MILLIGRAM(S): at 05:42

## 2019-06-06 RX ADMIN — BUDESONIDE AND FORMOTEROL FUMARATE DIHYDRATE 2 PUFF(S): 160; 4.5 AEROSOL RESPIRATORY (INHALATION) at 09:31

## 2019-06-06 RX ADMIN — Medication 600 MILLIGRAM(S): at 05:42

## 2019-06-06 RX ADMIN — OXYCODONE HYDROCHLORIDE 5 MILLIGRAM(S): 5 TABLET ORAL at 00:31

## 2019-06-06 RX ADMIN — Medication 100 MILLIGRAM(S): at 14:37

## 2019-06-06 RX ADMIN — FAMOTIDINE 20 MILLIGRAM(S): 10 INJECTION INTRAVENOUS at 17:55

## 2019-06-06 RX ADMIN — Medication 25 MILLIGRAM(S): at 05:43

## 2019-06-06 RX ADMIN — Medication 25 MILLIGRAM(S): at 17:57

## 2019-06-06 RX ADMIN — INSULIN GLARGINE 30 UNIT(S): 100 INJECTION, SOLUTION SUBCUTANEOUS at 07:31

## 2019-06-06 RX ADMIN — Medication 100 GRAM(S): at 05:43

## 2019-06-06 RX ADMIN — FAMOTIDINE 20 MILLIGRAM(S): 10 INJECTION INTRAVENOUS at 05:42

## 2019-06-06 RX ADMIN — Medication 3 MILLILITER(S): at 08:27

## 2019-06-06 RX ADMIN — Medication 325 MILLIGRAM(S): at 11:41

## 2019-06-06 RX ADMIN — Medication 100 MILLIGRAM(S): at 21:44

## 2019-06-06 RX ADMIN — ENOXAPARIN SODIUM 40 MILLIGRAM(S): 100 INJECTION SUBCUTANEOUS at 11:41

## 2019-06-06 RX ADMIN — CHLORHEXIDINE GLUCONATE 1 APPLICATION(S): 213 SOLUTION TOPICAL at 05:43

## 2019-06-06 RX ADMIN — Medication 40 MILLIGRAM(S): at 06:10

## 2019-06-06 RX ADMIN — Medication 4 UNIT(S): at 07:31

## 2019-06-06 NOTE — PROGRESS NOTE ADULT - SUBJECTIVE AND OBJECTIVE BOX
CTU Attending Progress Daily Note     06 Jun 2019 10:11  Admited 05-29-19, Hospital Day 8d  POD# - 6    HPI:  The patient is a 53-year-old male with no known PMH who presented with a three-day history of chest discomfort.  He described the discomfort as mid-sternal and intermittent (lasting about 10 minutes at a time and up to two episodes per day).  It is associated with diaphoresis, radiation to the jaw, and an abnormal sensation in her arms ("like my arms are without blood").  He has been experiencing chest discomfort for the past several months but noticed that they have been occurring more frequently over the past 3 days.  Otherwise, he denied having fever, chills, N/V/D, abdominal pain, diarrhea, or urinary complaints.  To note, he has not seen a medical doctor in many years. (29 May 2019 03:55)    Home Medications:    FAMILY HISTORY:  Family history of heart disease: Mother and father in their 60&#x27;s    PAST MEDICAL & SURGICAL HISTORY:  No pertinent past medical history  No significant past surgical history    Interval event for past 24 hr:  VESNA FERNÁNDEZ  53y had no event.   Current Complains:  VESNA FERNÁNDEZ has no new complains  Allergies    No Known Allergies    Intolerances      OBJECTIVE:  Vitals last 24 hrs  T(C): 36.8 (06-06-19 @ 07:43), Max: 36.8 (06-05-19 @ 15:00)  T(F): 98.2 (06-06-19 @ 07:43), Max: 98.2 (06-05-19 @ 15:00)  HR: 69 (06-06-19 @ 09:21) (65 - 93)  BP: 109/66 (06-06-19 @ 09:21) (109/66 - 167/116)  ABP: --  ABP(mean): --  RR: 18 (06-06-19 @ 09:21) (16 - 21)  SpO2: 95% (06-06-19 @ 09:21) (91% - 97%)    06-05-19 @ 07:01  -  06-06-19 @ 07:00  --------------------------------------------------------  IN:    IV PiggyBack: 200 mL    Oral Fluid: 1120 mL  Total IN: 1320 mL    OUT:    Voided: 2475 mL  Total OUT: 2475 mL    Total NET: -1155 mL          CAPILLARY BLOOD GLUCOSE      POCT Blood Glucose.: 101 mg/dL (06 Jun 2019 06:46)  POCT Blood Glucose.: 105 mg/dL (05 Jun 2019 21:45)  POCT Blood Glucose.: 138 mg/dL (05 Jun 2019 16:04)  POCT Blood Glucose.: 173 mg/dL (05 Jun 2019 11:16)    LABS:                          12.1   12.10 )-----------( 280      ( 06 Jun 2019 01:00 )             36.4     Hemoglobin: 12.1 g/dL (06-06 @ 01:00)  Hemoglobin: 11.0 g/dL (06-05 @ 01:00)  Hemoglobin: 10.9 g/dL (06-04 @ 02:00)    06-06    143  |  101  |  33<H>  ----------------------------<  100<H>  4.7   |  28  |  1.0    Ca    9.6      06 Jun 2019 01:00  Mg     2.4     06-06      Creatinine, Serum: 1.0 mg/dL (06-06 @ 01:00)  Creatinine, Serum: 1.0 mg/dL (06-05 @ 01:00)  Creatinine, Serum: 1.0 mg/dL (06-04 @ 02:00)  Creatinine, Serum: 0.9 mg/dL (06-03 @ 01:20)          HOSPITAL MEDICATIONS:  MEDICATIONS  (STANDING):  ALBUTerol/ipratropium for Nebulization 3 milliLiter(s) Nebulizer every 6 hours  aspirin enteric coated 325 milliGRAM(s) Oral daily  atorvastatin 40 milliGRAM(s) Oral at bedtime  buDESOnide 160 MICROgram(s)/formoterol 4.5 MICROgram(s) Inhaler 2 Puff(s) Inhalation two times a day  chlorhexidine 4% Liquid 1 Application(s) Topical <User Schedule>  dextrose 5%. 1000 milliLiter(s) (50 mL/Hr) IV Continuous <Continuous>  dextrose 50% Injectable 25 milliLiter(s) IV Push every 15 minutes  docusate sodium 100 milliGRAM(s) Oral three times a day  enoxaparin Injectable 40 milliGRAM(s) SubCutaneous daily  famotidine    Tablet 20 milliGRAM(s) Oral two times a day  guaiFENesin  milliGRAM(s) Oral every 12 hours  insulin lispro (HumaLOG) corrective regimen sliding scale   SubCutaneous three times a day before meals  isosorbide   mononitrate ER Tablet (IMDUR) 30 milliGRAM(s) Oral daily  magnesium sulfate  IVPB 1 Gram(s) IV Intermittent every 12 hours  metoprolol tartrate 25 milliGRAM(s) Oral two times a day  senna 2 Tablet(s) Oral at bedtime  sodium chloride 0.9%. 1000 milliLiter(s) (20 mL/Hr) IV Continuous <Continuous>  tamsulosin 0.4 milliGRAM(s) Oral at bedtime    MEDICATIONS  (PRN):  aluminum hydroxide/magnesium hydroxide/simethicone Suspension 30 milliLiter(s) Oral every 6 hours PRN Dyspepsia  dextrose 40% Gel 15 Gram(s) Oral once PRN Blood Glucose LESS THAN 70 milliGRAM(s)/deciliter  glucagon  Injectable 1 milliGRAM(s) IntraMuscular once PRN Glucose LESS THAN 70 milligrams/deciliter  ketorolac   Injectable 30 milliGRAM(s) IV Push every 6 hours PRN Moderate Pain (4 - 6)  oxyCODONE    IR 5 milliGRAM(s) Oral every 4 hours PRN Moderate Pain (4 - 6)  oxyCODONE    IR 10 milliGRAM(s) Oral every 4 hours PRN Severe Pain (7 - 10)      REVIEW OF SYSTEMS:  CONSTITUTIONAL: [X] all negative; [ ] weakness, [ ] fevers, [ ] chills  EYES/ENT: [X] all negative; [ ] visual changes, [ ] vertigo, [ ] throat pain, [ ] eye pain  NECK: [X] all negative; [ ] pain, [ ] stiffness  RESPIRATORY: [ ] all negative, [x ] cough, [ ] wheezing, [ ] hemoptysis, [x ] shortness of breath  CARDIOVASCULAR: [ ] all negative; [x ] chest pain, [ ] palpitations, [ ] orthopnea  GASTROINTESTINAL: [X] all negative; [ ]abdominal pain, [ ] nausea, [ ] vomiting, [ ] hematemesis, [ ] diarrhea, [ ] constipation, [ ] melena, [ ] hematochezia.  GENITOURINARY: [X] all negative; [ ] dysuria, [ ] frequency, [ ] hematuria  NEUROLOGICAL: [X] all negative; [ ] numbness, [ ] weakness, [ ] paresthesias  MUSCULOSKELETAL: [X] all negative, [ ] joint pain, [ ] joint swelling, [ ] joint redness, [ ] bone pain  SKIN: [X] all negative; [ ] itching, [ ] burning, [ ] rashes, [ ] lesions   All other review of systems is negative unless indicated above.    [  ] Unable to assess ROS because     PHYSICAL EXAM:          CONSTITUTIONAL: Well-developed; well-nourished; in no acute distress.   	SKIN: warm, dry, no rashes or lesions  	HEENT: Atraumatic. Normocephalic. PERRL. Moist membranes, no conj injection, sclera clear  	NECK: Supple; non tender.  No JVD. No lymphadenopathy.  	CARD: Normal S1, S2. Rate and Rhythm are regular. No murmurs.  	RESP: Good air entry bilaterally, no wheezes, no rales no rhonchi.  	ABD: Soft, not tender, not distended, no CVA ttp no rebound no guarding, bowel sounds present  	EXT: Normal ROM.  No clubbing, no cyanosis, no pedal edema, no calf pain b/l, Peripheral pulses intact.  	LYMPH: No acute cervical adenopathy.  	NEURO: Alert, awake, motor 5/5 R, 5/5 L, sensation intact bilat, CN 2-12 intact,          PSYCH: Cooperative, appropriate. Alert & oriented x 3    RADIOLOGY:  X Reviewed and interpreted by me  CxR from 06-06-19 shows mild congestion, no pneumothorax, no effusion, no cardiomegaly,       ECG:  X Reviewed and interpreted by me - NSR

## 2019-06-06 NOTE — PROGRESS NOTE ADULT - ASSESSMENT
PROBLEMS:  1.	CAD - s/p CABG - lopressor 25 q 12, ASA, Lipitor,  Imdur 30  2.	hypoxemia -better on NC 2 l/min, LE venous duplex negative , bronchodilators  3.	DM - on Lantus / Novolog  - start metformin tomorrow       PLAN  Neuro: move all 4 extremities. no sensory or motor deficits  Pain management.   ketorolac   Injectable 30 milliGRAM(s) IV Push every 6 hours PRN    Pulm: Wean off supplemental oxygen as able. Daily CXR. Encourage coughing, deep breathing and use of incentive spirometry.   ALBUTerol/ipratropium for Nebulization 3 milliLiter(s) Nebulizer every 6 hours  buDESOnide 160 MICROgram(s)/formoterol 4.5 MICROgram(s) Inhaler 2 Puff(s) Inhalation two times a day  guaiFENesin  milliGRAM(s) Oral every 12 hours    Cardio: Monitor telemetry/alarms. Continue supportive care   isosorbide   mononitrate ER Tablet (IMDUR) 30 milliGRAM(s) Oral daily  metoprolol tartrate 25 milliGRAM(s) Oral two times a day  tamsulosin 0.4 milliGRAM(s) Oral at bedtime    GI: Continue stool softeners.    aluminum hydroxide/magnesium hydroxide/simethicone Suspension 30 milliLiter(s) Oral every 6 hours PRN  famotidine    Tablet 20 milliGRAM(s) Oral two times a day  senna 2 Tablet(s) Oral at bedtime    Nutrition: Continue present diet  Endocrine and glucose control:   atorvastatin 40 milliGRAM(s) Oral at bedtime  dextrose 40% Gel 15 Gram(s) Oral once PRN  glucagon  Injectable 1 milliGRAM(s) IntraMuscular once PRN  insulin lispro (HumaLOG) corrective regimen sliding scale   SubCutaneous three times a day before meals    Renal: monitor urine output, supplement electrolytes as needed,     Vasc: Heparin SC and/or SCDs for DVT prophylaxis  enoxaparin Injectable 40 milliGRAM(s) SubCutaneous daily    ID: Stable, no fever , no chills. Off antibiotics.    Therapy: OOB/ambulate  Disposition: start planing discharge home or placement    Pertinent clinical, laboratory, radiographic, hemodynamic, echocardiographic, respiratory data, microbiologic data and chart were reviewed and analyzed frequently throughout the course of the day and night. GI and DVT prophylaxis, glycemic control, head of bed elevation and skin care issues were addressed.  Patient seen, examined and plan discussed with CT Surgery / CTICU team during rounds.

## 2019-06-06 NOTE — PROGRESS NOTE ADULT - SUBJECTIVE AND OBJECTIVE BOX
OPERATIVE PROCEDURE(s):                POD #                       53yMale  SURGEON(s): SIRIA Hunt  SUBJECTIVE ASSESSMENT:   Vital Signs Last 24 Hrs  T(F): 98.2 (06 Jun 2019 07:43), Max: 98.2 (05 Jun 2019 15:00)  HR: 79 (06 Jun 2019 07:43) (65 - 93)  BP: 124/77 (06 Jun 2019 07:43) (118/64 - 167/116)  BP(mean): 94 (06 Jun 2019 07:43) (85 - 133)  RR: 21 (06 Jun 2019 07:43) (16 - 21)  SpO2: 93% (06 Jun 2019 07:43) (91% - 97%)      I&O's Detail    05 Jun 2019 07:01  -  06 Jun 2019 07:00  --------------------------------------------------------  IN:    IV PiggyBack: 200 mL    Oral Fluid: 1120 mL  Total IN: 1320 mL    OUT:    Voided: 2475 mL  Total OUT: 2475 mL        Net:   I&O's Detail    04 Jun 2019 07:01  -  05 Jun 2019 07:00  --------------------------------------------------------  Total NET: -1700 mL      05 Jun 2019 07:01  -  06 Jun 2019 07:00  --------------------------------------------------------  Total NET: -1155 mL        CAPILLARY BLOOD GLUCOSE      POCT Blood Glucose.: 101 mg/dL (06 Jun 2019 06:46)  POCT Blood Glucose.: 105 mg/dL (05 Jun 2019 21:45)  POCT Blood Glucose.: 138 mg/dL (05 Jun 2019 16:04)  POCT Blood Glucose.: 173 mg/dL (05 Jun 2019 11:16)    Physical Exam:  General: NAD; A&Ox3/Patient is intubated and sedated  Cardiac: S1/S2, RRR, no murmur, no rubs  Lungs: unlabored respirations, CTA b/l, no wheeze, no rales, no crackles  Abdomen: Soft/NT/ND; positive bowel sounds x 4  Sternum: Intact, no click, incision healing well with no drainage  Incisions: Incisions clean/dry/intact  Extremities: No edema b/l lower extremities; good capillary refill; no cyanosis; palpable 1+ pedal pulses b/l    Central Venous Catheter: Yes[]  No[] , If Yes indication:           Day #  Valiente Catheter: Yes  [] , No  [] , If yes indication:                      Day #  NGT: Yes [] No [] ,    If Yes Placement:                                     Day #  EPICARDIAL WIRES:  [] YES [] NO                                              Day #  BOWEL MOVEMENT:  [] YES [] NO, If No, Timing since last BM:      Day #  CHEST TUBE(Left/Right):  [] YES [] NO, If yes -  AIR LEAKS:  [] YES [] NO        LABS:                        12.1<L>  12.10<H> )-----------( 280      ( 06 Jun 2019 01:00 )             36.4<L>                        11.0<L>  8.45  )-----------( 237      ( 05 Jun 2019 01:00 )             33.1<L>    06-06    143  |  101  |  33<H>  ----------------------------<  100<H>  4.7   |  28  |  1.0  06-05    141  |  101  |  36<H>  ----------------------------<  140<H>  4.8   |  25  |  1.0    Ca    9.6      06 Jun 2019 01:00  Mg     2.4     06-06          ABG - ( 05 Jun 2019 08:09 )  pH: 7.50  /  pCO2: 35    /  pO2: 53    / HCO3: 27    / Base Excess: 4.0   /  SaO2: 88    /  LA: 1.4              RADIOLOGY & ADDITIONAL TESTS:  CXR:  EKG:  MEDICATIONS  (STANDING):  ALBUTerol/ipratropium for Nebulization 3 milliLiter(s) Nebulizer every 6 hours  aspirin enteric coated 325 milliGRAM(s) Oral daily  atorvastatin 40 milliGRAM(s) Oral at bedtime  buDESOnide 160 MICROgram(s)/formoterol 4.5 MICROgram(s) Inhaler 2 Puff(s) Inhalation two times a day  chlorhexidine 4% Liquid 1 Application(s) Topical <User Schedule>  dextrose 5%. 1000 milliLiter(s) (50 mL/Hr) IV Continuous <Continuous>  dextrose 50% Injectable 25 milliLiter(s) IV Push every 15 minutes  docusate sodium 100 milliGRAM(s) Oral three times a day  enoxaparin Injectable 40 milliGRAM(s) SubCutaneous daily  famotidine    Tablet 20 milliGRAM(s) Oral two times a day  guaiFENesin  milliGRAM(s) Oral every 12 hours  insulin glargine Injectable (LANTUS) 30 Unit(s) SubCutaneous every morning  insulin lispro Injectable (HumaLOG) 4 Unit(s) SubCutaneous three times a day before meals  isosorbide   mononitrate ER Tablet (IMDUR) 30 milliGRAM(s) Oral daily  magnesium sulfate  IVPB 1 Gram(s) IV Intermittent every 12 hours  metoprolol tartrate 25 milliGRAM(s) Oral two times a day  senna 2 Tablet(s) Oral at bedtime  sodium chloride 0.9%. 1000 milliLiter(s) (20 mL/Hr) IV Continuous <Continuous>  tamsulosin 0.4 milliGRAM(s) Oral at bedtime    MEDICATIONS  (PRN):  aluminum hydroxide/magnesium hydroxide/simethicone Suspension 30 milliLiter(s) Oral every 6 hours PRN Dyspepsia  dextrose 40% Gel 15 Gram(s) Oral once PRN Blood Glucose LESS THAN 70 milliGRAM(s)/deciliter  glucagon  Injectable 1 milliGRAM(s) IntraMuscular once PRN Glucose LESS THAN 70 milligrams/deciliter  ketorolac   Injectable 30 milliGRAM(s) IV Push every 6 hours PRN Moderate Pain (4 - 6)  oxyCODONE    IR 5 milliGRAM(s) Oral every 4 hours PRN Moderate Pain (4 - 6)  oxyCODONE    IR 10 milliGRAM(s) Oral every 4 hours PRN Severe Pain (7 - 10)    HEPARIN:  [] YES [] NO  Dose: XX UNITS/HR UNITS Q8H  LOVENOX:[] YES [] NO  Dose: XX mg Q24H  COUMADIN: []  YES [] NO  Dose: XX mg  Q24H  SCD's: YES b/l  GI Prophylaxis: Protonix [], Pepcid [], None [], (Contra-indication:.....)    Post-Op Beta-Blockers: Yes [], No[], If No, then contraindication:  Post-Op Aspirin: Yes [],  No [], If No, then contraindication:  Post-Op Statin: Yes [], No[], If No, then contraindication:  Allergies    No Known Allergies    Intolerances      Ambulation/Activity Status:    Assessment/Plan:  53y Male status-post .....  - Case and plan discussed with CTU Intensivist and CT Surgeon - Dr. Cobian/Gilbert/Shviam   - Continue CTU supportive care    - Continue DVT/GI prophylaxis  - Incentive Spirometry 10 times an hour  - Continue to advance physical activity as tolerated and continue PT/OT as directed  1. CAD: Continue ASA, statin, BB  2. HTN:   3. A. Fib:   4. COPD/Hypoxia:   5. DM/Glucose Control:     Social Service Disposition: OPERATIVE PROCEDURE(s):   CABGx3, left radial harvest             POD # 6                      53yMale  SURGEON(s): SIRIA Hunt  SUBJECTIVE ASSESSMENT: pt seen and examined.  no acute complaints   Vital Signs Last 24 Hrs  T(F): 98.2 (06 Jun 2019 07:43), Max: 98.2 (05 Jun 2019 15:00)  HR: 79 (06 Jun 2019 07:43) (65 - 93)  BP: 124/77 (06 Jun 2019 07:43) (118/64 - 167/116)  BP(mean): 94 (06 Jun 2019 07:43) (85 - 133)  RR: 21 (06 Jun 2019 07:43) (16 - 21)  SpO2: 93% (06 Jun 2019 07:43) (91% - 97%)      I&O's Detail    05 Jun 2019 07:01  -  06 Jun 2019 07:00  --------------------------------------------------------  IN:    IV PiggyBack: 200 mL    Oral Fluid: 1120 mL  Total IN: 1320 mL    OUT:    Voided: 2475 mL  Total OUT: 2475 mL        Net:   I&O's Detail    04 Jun 2019 07:01  -  05 Jun 2019 07:00  --------------------------------------------------------  Total NET: -1700 mL      05 Jun 2019 07:01  -  06 Jun 2019 07:00  --------------------------------------------------------  Total NET: -1155 mL        CAPILLARY BLOOD GLUCOSE      POCT Blood Glucose.: 101 mg/dL (06 Jun 2019 06:46)  POCT Blood Glucose.: 105 mg/dL (05 Jun 2019 21:45)  POCT Blood Glucose.: 138 mg/dL (05 Jun 2019 16:04)  POCT Blood Glucose.: 173 mg/dL (05 Jun 2019 11:16)    Physical Exam:  General: NAD; A&Ox3  Cardiac: S1/S2, RRR, no murmur, no rubs  Lungs: unlabored respirations, CTA b/l, no wheeze, no rales, no crackles  Abdomen: Soft/NT/ND; positive bowel sounds x 4  Sternum: Intact, no click, incision healing well with no drainage  Incisions: Incisions clean/dry/intact  Extremities: trace edema b/l lower extremities; good capillary refill; no cyanosis; palpable 1+ pedal pulses b/l    Central Venous Catheter: Yes[]  No[x] , If Yes indication:           Day #  Valiente Catheter: Yes  [] , No  [x] , If yes indication:                      Day #  NGT: Yes [] No [x] ,    If Yes Placement:                                     Day #  EPICARDIAL WIRES:  [x] YES [] NO                                              Day #  BOWEL MOVEMENT:  [x] YES [] NO, If No, Timing since last BM:      Day #  CHEST TUBE(Left/Right):  [] YES [x] NO, If yes -  AIR LEAKS:  [] YES [] NO       LABS:                        12.1<L>  12.10<H> )-----------( 280      ( 06 Jun 2019 01:00 )             36.4<L>                        11.0<L>  8.45  )-----------( 237      ( 05 Jun 2019 01:00 )             33.1<L>    06-06    143  |  101  |  33<H>  ----------------------------<  100<H>  4.7   |  28  |  1.0  06-05    141  |  101  |  36<H>  ----------------------------<  140<H>  4.8   |  25  |  1.0    Ca    9.6      06 Jun 2019 01:00  Mg     2.4     06-06      ABG - ( 05 Jun 2019 08:09 )  pH: 7.50  /  pCO2: 35    /  pO2: 53    / HCO3: 27    / Base Excess: 4.0   /  SaO2: 88    /  LA: 1.4        RADIOLOGY & ADDITIONAL TESTS:  CXR: < from: Xray Chest 2 Views PA/Lat (06.06.19 @ 08:55) >    Impression:      Basilar opacifications without change.    Follow-up as needed.    < end of copied text >    EKG:  MEDICATIONS  (STANDING):  ALBUTerol/ipratropium for Nebulization 3 milliLiter(s) Nebulizer every 6 hours  aspirin enteric coated 325 milliGRAM(s) Oral daily  atorvastatin 40 milliGRAM(s) Oral at bedtime  buDESOnide 160 MICROgram(s)/formoterol 4.5 MICROgram(s) Inhaler 2 Puff(s) Inhalation two times a day  chlorhexidine 4% Liquid 1 Application(s) Topical <User Schedule>  dextrose 5%. 1000 milliLiter(s) (50 mL/Hr) IV Continuous <Continuous>  dextrose 50% Injectable 25 milliLiter(s) IV Push every 15 minutes  docusate sodium 100 milliGRAM(s) Oral three times a day  enoxaparin Injectable 40 milliGRAM(s) SubCutaneous daily  famotidine    Tablet 20 milliGRAM(s) Oral two times a day  guaiFENesin  milliGRAM(s) Oral every 12 hours  insulin glargine Injectable (LANTUS) 30 Unit(s) SubCutaneous every morning  insulin lispro Injectable (HumaLOG) 4 Unit(s) SubCutaneous three times a day before meals  isosorbide   mononitrate ER Tablet (IMDUR) 30 milliGRAM(s) Oral daily  magnesium sulfate  IVPB 1 Gram(s) IV Intermittent every 12 hours  metoprolol tartrate 25 milliGRAM(s) Oral two times a day  senna 2 Tablet(s) Oral at bedtime  sodium chloride 0.9%. 1000 milliLiter(s) (20 mL/Hr) IV Continuous <Continuous>  tamsulosin 0.4 milliGRAM(s) Oral at bedtime    MEDICATIONS  (PRN):  aluminum hydroxide/magnesium hydroxide/simethicone Suspension 30 milliLiter(s) Oral every 6 hours PRN Dyspepsia  dextrose 40% Gel 15 Gram(s) Oral once PRN Blood Glucose LESS THAN 70 milliGRAM(s)/deciliter  glucagon  Injectable 1 milliGRAM(s) IntraMuscular once PRN Glucose LESS THAN 70 milligrams/deciliter  ketorolac   Injectable 30 milliGRAM(s) IV Push every 6 hours PRN Moderate Pain (4 - 6)  oxyCODONE    IR 5 milliGRAM(s) Oral every 4 hours PRN Moderate Pain (4 - 6)  oxyCODONE    IR 10 milliGRAM(s) Oral every 4 hours PRN Severe Pain (7 - 10)    HEPARIN:  [] YES [x] NO  Dose: XX UNITS/HR UNITS Q8H  LOVENOX:[x] YES [] NO  Dose: 40 mg Q24H  COUMADIN: []  YES [x] NO  Dose: XX mg  Q24H  SCD's: YES b/l  GI Prophylaxis: Protonix [], Pepcid [x], None [], (Contra-indication:.....)    Post-Op Beta-Blockers: Yes [x], No[], If No, then contraindication:  Post-Op Aspirin: Yes [x],  No [], If No, then contraindication:  Post-Op Statin: Yes [x], No[], If No, then contraindication:  Allergies    No Known Allergies    Intolerances      Ambulation/Activity Status: ambulate     Assessment/Plan:  53y Male status-post CABGx3, left radial harvest POD#6  - Case and plan discussed with CTU Intensivist and CT Surgeon - Dr. Cobian/Gilbert/Shivam   - Continue CTU supportive care    - Continue DVT/GI prophylaxis  - Incentive Spirometry 10 times an hour  - Continue to advance physical activity as tolerated and continue PT/OT as directed  1. CAD: Continue ASA, statin, bb, cont imdur to prevent radial vasospam   2. HTN: increase lopressor  3. A. Fib prophylaxis: cont mag, cont lopressor   4. COPD/Hypoxia: cont nebs, wean o2, bipap as needed.   5. DM/Glucose Control: cont lantus 30, humalog 5 with sliding scale    Social Service Disposition: home

## 2019-06-06 NOTE — PROGRESS NOTE ADULT - ASSESSMENT
CTS ATTENDING    POD#6  S/P CABG  Awake and alert  vitals stable   Was able to come off oxygen and maintain stauration above 90%  Ambulated  Tolerating diet    Patient will be discharged tomorrow.

## 2019-06-07 VITALS
SYSTOLIC BLOOD PRESSURE: 103 MMHG | RESPIRATION RATE: 19 BRPM | TEMPERATURE: 98 F | HEART RATE: 96 BPM | OXYGEN SATURATION: 95 % | DIASTOLIC BLOOD PRESSURE: 65 MMHG

## 2019-06-07 PROBLEM — Z78.9 OTHER SPECIFIED HEALTH STATUS: Chronic | Status: ACTIVE | Noted: 2019-05-29

## 2019-06-07 LAB
ANION GAP SERPL CALC-SCNC: 15 MMOL/L — HIGH (ref 7–14)
BASOPHILS # BLD AUTO: 0.04 K/UL — SIGNIFICANT CHANGE UP (ref 0–0.2)
BASOPHILS NFR BLD AUTO: 0.4 % — SIGNIFICANT CHANGE UP (ref 0–1)
BUN SERPL-MCNC: 39 MG/DL — HIGH (ref 10–20)
CALCIUM SERPL-MCNC: 9 MG/DL — SIGNIFICANT CHANGE UP (ref 8.5–10.1)
CHLORIDE SERPL-SCNC: 101 MMOL/L — SIGNIFICANT CHANGE UP (ref 98–110)
CO2 SERPL-SCNC: 23 MMOL/L — SIGNIFICANT CHANGE UP (ref 17–32)
CREAT SERPL-MCNC: 1 MG/DL — SIGNIFICANT CHANGE UP (ref 0.7–1.5)
EOSINOPHIL # BLD AUTO: 0.2 K/UL — SIGNIFICANT CHANGE UP (ref 0–0.7)
EOSINOPHIL NFR BLD AUTO: 2 % — SIGNIFICANT CHANGE UP (ref 0–8)
GLUCOSE BLDC GLUCOMTR-MCNC: 82 MG/DL — SIGNIFICANT CHANGE UP (ref 70–99)
GLUCOSE BLDC GLUCOMTR-MCNC: 96 MG/DL — SIGNIFICANT CHANGE UP (ref 70–99)
GLUCOSE SERPL-MCNC: 92 MG/DL — SIGNIFICANT CHANGE UP (ref 70–99)
HCT VFR BLD CALC: 38 % — LOW (ref 42–52)
HGB BLD-MCNC: 12.7 G/DL — LOW (ref 14–18)
IMM GRANULOCYTES NFR BLD AUTO: 0.8 % — HIGH (ref 0.1–0.3)
LYMPHOCYTES # BLD AUTO: 3.13 K/UL — SIGNIFICANT CHANGE UP (ref 1.2–3.4)
LYMPHOCYTES # BLD AUTO: 31.2 % — SIGNIFICANT CHANGE UP (ref 20.5–51.1)
MAGNESIUM SERPL-MCNC: 2.3 MG/DL — SIGNIFICANT CHANGE UP (ref 1.8–2.4)
MCHC RBC-ENTMCNC: 29.6 PG — SIGNIFICANT CHANGE UP (ref 27–31)
MCHC RBC-ENTMCNC: 33.4 G/DL — SIGNIFICANT CHANGE UP (ref 32–37)
MCV RBC AUTO: 88.6 FL — SIGNIFICANT CHANGE UP (ref 80–94)
MONOCYTES # BLD AUTO: 1.17 K/UL — HIGH (ref 0.1–0.6)
MONOCYTES NFR BLD AUTO: 11.7 % — HIGH (ref 1.7–9.3)
NEUTROPHILS # BLD AUTO: 5.41 K/UL — SIGNIFICANT CHANGE UP (ref 1.4–6.5)
NEUTROPHILS NFR BLD AUTO: 53.9 % — SIGNIFICANT CHANGE UP (ref 42.2–75.2)
NRBC # BLD: 0 /100 WBCS — SIGNIFICANT CHANGE UP (ref 0–0)
PLATELET # BLD AUTO: 283 K/UL — SIGNIFICANT CHANGE UP (ref 130–400)
POTASSIUM SERPL-MCNC: 4.5 MMOL/L — SIGNIFICANT CHANGE UP (ref 3.5–5)
POTASSIUM SERPL-SCNC: 4.5 MMOL/L — SIGNIFICANT CHANGE UP (ref 3.5–5)
RBC # BLD: 4.29 M/UL — LOW (ref 4.7–6.1)
RBC # FLD: 13.3 % — SIGNIFICANT CHANGE UP (ref 11.5–14.5)
SODIUM SERPL-SCNC: 139 MMOL/L — SIGNIFICANT CHANGE UP (ref 135–146)
WBC # BLD: 10.03 K/UL — SIGNIFICANT CHANGE UP (ref 4.8–10.8)
WBC # FLD AUTO: 10.03 K/UL — SIGNIFICANT CHANGE UP (ref 4.8–10.8)

## 2019-06-07 PROCEDURE — 93010 ELECTROCARDIOGRAM REPORT: CPT

## 2019-06-07 PROCEDURE — 99233 SBSQ HOSP IP/OBS HIGH 50: CPT

## 2019-06-07 RX ORDER — ATORVASTATIN CALCIUM 80 MG/1
1 TABLET, FILM COATED ORAL
Qty: 30 | Refills: 0
Start: 2019-06-07 | End: 2019-07-06

## 2019-06-07 RX ORDER — METFORMIN HYDROCHLORIDE 850 MG/1
1 TABLET ORAL
Qty: 28 | Refills: 0
Start: 2019-06-07 | End: 2019-06-20

## 2019-06-07 RX ORDER — ASPIRIN/CALCIUM CARB/MAGNESIUM 324 MG
1 TABLET ORAL
Qty: 30 | Refills: 0
Start: 2019-06-07 | End: 2019-07-06

## 2019-06-07 RX ORDER — OXYCODONE HYDROCHLORIDE 5 MG/1
1 TABLET ORAL
Qty: 24 | Refills: 0
Start: 2019-06-07 | End: 2019-06-10

## 2019-06-07 RX ORDER — FAMOTIDINE 10 MG/ML
1 INJECTION INTRAVENOUS
Qty: 28 | Refills: 0
Start: 2019-06-07 | End: 2019-06-20

## 2019-06-07 RX ORDER — BUDESONIDE AND FORMOTEROL FUMARATE DIHYDRATE 160; 4.5 UG/1; UG/1
2 AEROSOL RESPIRATORY (INHALATION)
Qty: 1 | Refills: 0
Start: 2019-06-07 | End: 2019-07-06

## 2019-06-07 RX ORDER — METOPROLOL TARTRATE 50 MG
1 TABLET ORAL
Qty: 60 | Refills: 0
Start: 2019-06-07 | End: 2019-07-06

## 2019-06-07 RX ORDER — ISOSORBIDE MONONITRATE 60 MG/1
1 TABLET, EXTENDED RELEASE ORAL
Qty: 30 | Refills: 6
Start: 2019-06-07 | End: 2020-01-02

## 2019-06-07 RX ORDER — TAMSULOSIN HYDROCHLORIDE 0.4 MG/1
1 CAPSULE ORAL
Qty: 14 | Refills: 0
Start: 2019-06-07 | End: 2019-06-20

## 2019-06-07 RX ORDER — DOCUSATE SODIUM 100 MG
1 CAPSULE ORAL
Qty: 42 | Refills: 0
Start: 2019-06-07 | End: 2019-06-20

## 2019-06-07 RX ADMIN — Medication 100 MILLIGRAM(S): at 05:54

## 2019-06-07 RX ADMIN — BUDESONIDE AND FORMOTEROL FUMARATE DIHYDRATE 2 PUFF(S): 160; 4.5 AEROSOL RESPIRATORY (INHALATION) at 07:49

## 2019-06-07 RX ADMIN — Medication 325 MILLIGRAM(S): at 11:45

## 2019-06-07 RX ADMIN — ISOSORBIDE MONONITRATE 30 MILLIGRAM(S): 60 TABLET, EXTENDED RELEASE ORAL at 11:45

## 2019-06-07 RX ADMIN — Medication 25 MILLIGRAM(S): at 05:54

## 2019-06-07 RX ADMIN — METFORMIN HYDROCHLORIDE 500 MILLIGRAM(S): 850 TABLET ORAL at 05:53

## 2019-06-07 RX ADMIN — Medication 100 GRAM(S): at 05:53

## 2019-06-07 RX ADMIN — Medication 600 MILLIGRAM(S): at 05:54

## 2019-06-07 RX ADMIN — FAMOTIDINE 20 MILLIGRAM(S): 10 INJECTION INTRAVENOUS at 05:54

## 2019-06-07 NOTE — PROGRESS NOTE ADULT - ASSESSMENT
PROBLEMS:  1.	CAD - s/p CABG - lopressor 25 q 12, ASA, Lipitor,  Imdur 30  2.	hypoxemia -better off oxygen, LE venous duplex negative , bronchodilators  3.	DM -  metformin 500  4.	d/c home     PLAN  Neuro: move all 4 extremities. no sensory or motor deficits  Pain management.     Pulm: Wean off supplemental oxygen as able. Daily CXR. Encourage coughing, deep breathing and use of incentive spirometry.   buDESOnide 160 MICROgram(s)/formoterol 4.5 MICROgram(s) Inhaler 2 Puff(s) Inhalation two times a day  guaiFENesin  milliGRAM(s) Oral every 12 hours    Cardio: Monitor telemetry/alarms. Continue supportive care   isosorbide   mononitrate ER Tablet (IMDUR) 30 milliGRAM(s) Oral daily  metoprolol tartrate 25 milliGRAM(s) Oral two times a day  tamsulosin 0.4 milliGRAM(s) Oral at bedtime    GI: Continue stool softeners.    famotidine    Tablet 20 milliGRAM(s) Oral two times a day    Nutrition: Continue present diet  Endocrine and glucose control:   atorvastatin 40 milliGRAM(s) Oral at bedtime  metFORMIN 500 milliGRAM(s) Oral every 12 hours    Renal: monitor urine output, supplement electrolytes as needed,     Vasc: Heparin SC and/or SCDs for DVT prophylaxis    ID: Stable, no fever , no chills. Off antibiotics.    Therapy: OOB/ambulate  Disposition: start planing discharge home or placement    Pertinent clinical, laboratory, radiographic, hemodynamic, echocardiographic, respiratory data, microbiologic data and chart were reviewed and analyzed frequently throughout the course of the day and night. GI and DVT prophylaxis, glycemic control, head of bed elevation and skin care issues were addressed.  Patient seen, examined and plan discussed with CT Surgery / CTICU team during rounds.

## 2019-06-07 NOTE — PROGRESS NOTE ADULT - REASON FOR ADMISSION
Chest discomfort
POST CABGx3
Chest discomfort

## 2019-06-07 NOTE — PROGRESS NOTE ADULT - NSHPATTENDINGPLANDISCUSS_GEN_ALL_CORE
ctu team
CT Surgery / CTICU team during rounds.
CTU team
CT Surgery / CTICU team during rounds.
ctu team
CT Surgery / CTICU team during rounds.

## 2019-06-07 NOTE — PROGRESS NOTE ADULT - SUBJECTIVE AND OBJECTIVE BOX
CTU Attending Progress Daily Note     07 Jun 2019 11:52  Admited 05-29-19, Hospital Day 9d  POD# - 8    HPI:  The patient is a 53-year-old male with no known PMH who presented with a three-day history of chest discomfort.  He described the discomfort as mid-sternal and intermittent (lasting about 10 minutes at a time and up to two episodes per day).  It is associated with diaphoresis, radiation to the jaw, and an abnormal sensation in her arms ("like my arms are without blood").  He has been experiencing chest discomfort for the past several months but noticed that they have been occurring more frequently over the past 3 days.  Otherwise, he denied having fever, chills, N/V/D, abdominal pain, diarrhea, or urinary complaints.  To note, he has not seen a medical doctor in many years. (29 May 2019 03:55)    Home Medications:    FAMILY HISTORY:  Family history of heart disease: Mother and father in their 60&#x27;s    PAST MEDICAL & SURGICAL HISTORY:  No pertinent past medical history  No significant past surgical history    Interval event for past 24 hr:  VESNA FERNÁNDEZ  53y had no event.   Current Complains:  VESNA FERNÁNDEZ has no new complains  Allergies    No Known Allergies    Intolerances      OBJECTIVE:  Vitals last 24 hrs  T(C): 36.7 (06-07-19 @ 09:00), Max: 36.9 (06-06-19 @ 16:00)  T(F): 98.1 (06-07-19 @ 09:00), Max: 98.5 (06-06-19 @ 16:00)  HR: 95 (06-07-19 @ 10:00) (69 - 95)  BP: 113/67 (06-07-19 @ 09:45) (95/57 - 130/75)  ABP: --  ABP(mean): --  RR: 22 (06-07-19 @ 09:45) (18 - 22)  SpO2: 76% (06-07-19 @ 09:45) (76% - 95%)      06-06-19 @ 07:01  -  06-07-19 @ 07:00  --------------------------------------------------------  IN:    IV PiggyBack: 200 mL    Oral Fluid: 1080 mL  Total IN: 1280 mL    OUT:    Stool: 1 mL    Voided: 1000 mL  Total OUT: 1001 mL    Total NET: 279 mL          CAPILLARY BLOOD GLUCOSE      POCT Blood Glucose.: 82 mg/dL (07 Jun 2019 11:26)  POCT Blood Glucose.: 96 mg/dL (07 Jun 2019 05:47)  POCT Blood Glucose.: 105 mg/dL (06 Jun 2019 21:42)  POCT Blood Glucose.: 85 mg/dL (06 Jun 2019 16:28)    LABS:                          12.7   10.03 )-----------( 283      ( 07 Jun 2019 01:50 )             38.0     Hemoglobin: 12.7 g/dL (06-07 @ 01:50)  Hemoglobin: 12.1 g/dL (06-06 @ 01:00)  Hemoglobin: 11.0 g/dL (06-05 @ 01:00)    06-07    139  |  101  |  39<H>  ----------------------------<  92  4.5   |  23  |  1.0    Ca    9.0      07 Jun 2019 01:50  Mg     2.3     06-07      Creatinine, Serum: 1.0 mg/dL (06-07 @ 01:50)  Creatinine, Serum: 1.0 mg/dL (06-06 @ 01:00)  Creatinine, Serum: 1.0 mg/dL (06-05 @ 01:00)  Creatinine, Serum: 1.0 mg/dL (06-04 @ 02:00)          HOSPITAL MEDICATIONS:  MEDICATIONS  (STANDING):  aspirin enteric coated 325 milliGRAM(s) Oral daily  atorvastatin 40 milliGRAM(s) Oral at bedtime  buDESOnide 160 MICROgram(s)/formoterol 4.5 MICROgram(s) Inhaler 2 Puff(s) Inhalation two times a day  docusate sodium 100 milliGRAM(s) Oral three times a day  famotidine    Tablet 20 milliGRAM(s) Oral two times a day  guaiFENesin  milliGRAM(s) Oral every 12 hours  isosorbide   mononitrate ER Tablet (IMDUR) 30 milliGRAM(s) Oral daily  metFORMIN 500 milliGRAM(s) Oral every 12 hours  metoprolol tartrate 25 milliGRAM(s) Oral two times a day  tamsulosin 0.4 milliGRAM(s) Oral at bedtime    MEDICATIONS  (PRN):  oxyCODONE    IR 5 milliGRAM(s) Oral every 4 hours PRN Moderate Pain (4 - 6)  oxyCODONE    IR 10 milliGRAM(s) Oral every 4 hours PRN Severe Pain (7 - 10)      REVIEW OF SYSTEMS:  CONSTITUTIONAL: [X] all negative; [ ] weakness, [ ] fevers, [ ] chills  EYES/ENT: [X] all negative; [ ] visual changes, [ ] vertigo, [ ] throat pain, [ ] eye pain  NECK: [X] all negative; [ ] pain, [ ] stiffness  RESPIRATORY: [ ] all negative, [x ] cough, [ ] wheezing, [ ] hemoptysis, [x ] shortness of breath  CARDIOVASCULAR: [ ] all negative; [x ] chest pain, [ ] palpitations, [ ] orthopnea  GASTROINTESTINAL: [X] all negative; [ ]abdominal pain, [ ] nausea, [ ] vomiting, [ ] hematemesis, [ ] diarrhea, [ ] constipation, [ ] melena, [ ] hematochezia.  GENITOURINARY: [X] all negative; [ ] dysuria, [ ] frequency, [ ] hematuria  NEUROLOGICAL: [X] all negative; [ ] numbness, [ ] weakness, [ ] paresthesias  MUSCULOSKELETAL: [X] all negative, [ ] joint pain, [ ] joint swelling, [ ] joint redness, [ ] bone pain  SKIN: [X] all negative; [ ] itching, [ ] burning, [ ] rashes, [ ] lesions   All other review of systems is negative unless indicated above.    [  ] Unable to assess ROS because     PHYSICAL EXAM:          CONSTITUTIONAL: Well-developed; well-nourished; in no acute distress.   	SKIN: warm, dry, no rashes or lesions  	HEENT: Atraumatic. Normocephalic. PERRL. Moist membranes, no conj injection, sclera clear  	NECK: Supple; non tender.  No JVD. No lymphadenopathy.  	CARD: Normal S1, S2. Rate and Rhythm are regular. No murmurs.  	RESP: Good air entry bilaterally, no wheezes, no rales no rhonchi.  	ABD: Soft, not tender, not distended, no CVA ttp no rebound no guarding, bowel sounds present  	EXT: Normal ROM.  No clubbing, no cyanosis, no pedal edema, no calf pain b/l, Peripheral pulses intact.  	LYMPH: No acute cervical adenopathy.  	NEURO: Alert, awake, motor 5/5 R, 5/5 L, sensation intact bilat, CN 2-12 intact,          PSYCH: Cooperative, appropriate. Alert & oriented x 3    RADIOLOGY:  X Reviewed and interpreted by me  CxR from 06-07-19 shows mild congestion, no pneumothorax, no effusion, no cardiomegaly,       ECG:  X Reviewed and interpreted by me - NSR

## 2019-06-07 NOTE — PROGRESS NOTE ADULT - PROVIDER SPECIALTY LIST ADULT
CT Surgery
Cardiology
Critical Care
CT Surgery
CT Surgery
Hospitalist
Internal Medicine
Critical Care

## 2019-06-11 ENCOUNTER — APPOINTMENT (OUTPATIENT)
Dept: CARDIOTHORACIC SURGERY | Facility: CLINIC | Age: 54
End: 2019-06-11

## 2019-06-11 VITALS
RESPIRATION RATE: 13 BRPM | HEIGHT: 70 IN | OXYGEN SATURATION: 94 % | WEIGHT: 214 LBS | TEMPERATURE: 98 F | SYSTOLIC BLOOD PRESSURE: 122 MMHG | DIASTOLIC BLOOD PRESSURE: 79 MMHG | HEART RATE: 82 BPM | BODY MASS INDEX: 30.64 KG/M2

## 2019-06-13 VITALS
HEART RATE: 92 BPM | SYSTOLIC BLOOD PRESSURE: 120 MMHG | DIASTOLIC BLOOD PRESSURE: 72 MMHG | OXYGEN SATURATION: 99 % | RESPIRATION RATE: 12 BRPM

## 2019-06-13 DIAGNOSIS — Z87.891 PERSONAL HISTORY OF NICOTINE DEPENDENCE: ICD-10-CM

## 2019-06-13 RX ORDER — NAPROXEN 500 MG/1
500 TABLET ORAL TWICE DAILY
Qty: 30 | Refills: 1 | Status: DISCONTINUED | COMMUNITY
Start: 2018-05-08 | End: 2019-06-13

## 2019-06-13 RX ORDER — ASPIRIN 325 MG/1
325 TABLET, COATED ORAL DAILY
Refills: 0 | Status: ACTIVE | COMMUNITY

## 2019-06-13 NOTE — ASSESSMENT
[FreeTextEntry1] : Education\par 1.  DC instructions reviewed with patient - discussed importance of medications (indication, schedule, side effects, and importance of compliance reviewed) and f/u appointments.\par 2.  Clean incision sites daily - shower indirectly, clean with soap and water, pat dry.  Avoid the use of lotions, ointments, powders, and perfumes on or around incision sites.\par 3.  Sternal precautions - avoid any heavy lifting (>5-10 lbs x 8 weeks), raising both arms above head simultaneously.\par 4.  Place TEDs on in AM prior to getting out of bed and off in PM when returning to bed.\par 5.  Follow a heart healthy diet - low salt, low fat.\par 6.  Exercise daily.\par 7.  Monitor and call [facility] NP for signs and symptoms of infection (redness, drainage, and fever).\par 8.  Monitor daily weights (call for a gain of 2-3 lbs/day or 5-6 lbs/week). \par 9.  Blood sugar control necessary for wound healing if applicable.\par 10.  Avoid straining during BM - use stool softners as needed. \par 11.  Instructed on importance of incentive spirometry use (10x/hour).\par \par Patient verbalized understanding of all education outlined above. Pt instructed to call [facility] NP for any issues as delineated above.\par \par PLAN\par 1.  Monitor daily weights\par 2.  Continue current medications as prescribed\par 3.  Incentive spirometry use\par 4.  Maintain sternal precautions\par 5.  Exercise daily\par 5.  Maintain HH diet\par 6.  Maintain TEDs\par 7.  Keep legs elevated\par 8.  F/U appointments - \par CTSx Dr. Hunt 6/11, 6/18\par Cardio Dr. Parra 6/21\par PMD Dr. Rick WONG\par 9.  [facility] NP will continue to f/u with patient status - Pt agrees to call with any questions/concerns\par 10. To recover without complications.\par 11.  Pt instructed to finish full course of po Abx.\par 12.  Pt instructed to elevate RLE while at rest and to apply warm compress to RLE hematoma site to facilitate reabsorption.\par

## 2019-06-13 NOTE — HISTORY OF PRESENT ILLNESS
[FreeTextEntry1] : 54 y/o male denies significant medical hx w/ CAD s/p CABG on 5/31/19 w/ Dr. Hunt is seen in home by [facility] NP for a post-surgical f/u.  Pt offers no complaints/concerns at this time however, pt was placed on keflex 500 mg q 8 for LUE radial harvest site and RLE SVG harvest site (BLLE duplex performed inpatient negative for DVT)- see physical exam portion for impression.

## 2019-06-17 DIAGNOSIS — R55 SYNCOPE AND COLLAPSE: ICD-10-CM

## 2019-06-17 DIAGNOSIS — D62 ACUTE POSTHEMORRHAGIC ANEMIA: ICD-10-CM

## 2019-06-17 DIAGNOSIS — K59.00 CONSTIPATION, UNSPECIFIED: ICD-10-CM

## 2019-06-17 DIAGNOSIS — R09.02 HYPOXEMIA: ICD-10-CM

## 2019-06-17 DIAGNOSIS — J98.11 ATELECTASIS: ICD-10-CM

## 2019-06-17 DIAGNOSIS — F17.210 NICOTINE DEPENDENCE, CIGARETTES, UNCOMPLICATED: ICD-10-CM

## 2019-06-17 DIAGNOSIS — E78.5 HYPERLIPIDEMIA, UNSPECIFIED: ICD-10-CM

## 2019-06-17 DIAGNOSIS — I25.119 ATHEROSCLEROTIC HEART DISEASE OF NATIVE CORONARY ARTERY WITH UNSPECIFIED ANGINA PECTORIS: ICD-10-CM

## 2019-06-17 DIAGNOSIS — J44.9 CHRONIC OBSTRUCTIVE PULMONARY DISEASE, UNSPECIFIED: ICD-10-CM

## 2019-06-17 DIAGNOSIS — E87.70 FLUID OVERLOAD, UNSPECIFIED: ICD-10-CM

## 2019-06-17 DIAGNOSIS — T50.1X5A ADVERSE EFFECT OF LOOP [HIGH-CEILING] DIURETICS, INITIAL ENCOUNTER: ICD-10-CM

## 2019-06-17 DIAGNOSIS — G89.18 OTHER ACUTE POSTPROCEDURAL PAIN: ICD-10-CM

## 2019-06-17 DIAGNOSIS — Z82.49 FAMILY HISTORY OF ISCHEMIC HEART DISEASE AND OTHER DISEASES OF THE CIRCULATORY SYSTEM: ICD-10-CM

## 2019-06-17 DIAGNOSIS — I10 ESSENTIAL (PRIMARY) HYPERTENSION: ICD-10-CM

## 2019-06-17 DIAGNOSIS — E11.65 TYPE 2 DIABETES MELLITUS WITH HYPERGLYCEMIA: ICD-10-CM

## 2019-06-18 ENCOUNTER — APPOINTMENT (OUTPATIENT)
Dept: CARDIOTHORACIC SURGERY | Facility: CLINIC | Age: 54
End: 2019-06-18

## 2019-06-18 VITALS
BODY MASS INDEX: 30.71 KG/M2 | TEMPERATURE: 98.4 F | WEIGHT: 214.5 LBS | OXYGEN SATURATION: 95 % | DIASTOLIC BLOOD PRESSURE: 77 MMHG | RESPIRATION RATE: 13 BRPM | HEIGHT: 70 IN | SYSTOLIC BLOOD PRESSURE: 115 MMHG | HEART RATE: 76 BPM

## 2019-06-18 RX ORDER — GUAIFENESIN 600 MG
600 TABLET, EXTENDED RELEASE ORAL
Refills: 0 | Status: DISCONTINUED | COMMUNITY
End: 2019-06-18

## 2019-06-18 RX ORDER — FAMOTIDINE 20 MG/1
20 TABLET, FILM COATED ORAL
Refills: 0 | Status: DISCONTINUED | COMMUNITY
End: 2019-06-18

## 2019-06-18 RX ORDER — CEPHALEXIN 500 MG/1
500 TABLET ORAL 3 TIMES DAILY
Qty: 21 | Refills: 0 | Status: DISCONTINUED | COMMUNITY
Start: 2019-06-11 | End: 2019-06-18

## 2019-06-25 ENCOUNTER — APPOINTMENT (OUTPATIENT)
Age: 54
End: 2019-06-25
Payer: COMMERCIAL

## 2019-06-25 VITALS
TEMPERATURE: 97.9 F | DIASTOLIC BLOOD PRESSURE: 83 MMHG | SYSTOLIC BLOOD PRESSURE: 144 MMHG | OXYGEN SATURATION: 96 % | RESPIRATION RATE: 13 BRPM | BODY MASS INDEX: 31.14 KG/M2 | WEIGHT: 217 LBS | HEART RATE: 70 BPM

## 2019-06-25 PROCEDURE — 99024 POSTOP FOLLOW-UP VISIT: CPT

## 2019-06-25 RX ORDER — DOCUSATE SODIUM 100 MG/1
100 CAPSULE, LIQUID FILLED ORAL
Refills: 0 | Status: DISCONTINUED | COMMUNITY
End: 2019-06-25

## 2019-06-25 RX ORDER — BUDESONIDE 1 MG/2ML
INHALANT ORAL
Refills: 0 | Status: DISCONTINUED | COMMUNITY
End: 2019-06-25

## 2019-06-25 RX ORDER — OXYCODONE 5 MG/1
5 TABLET ORAL
Refills: 0 | Status: DISCONTINUED | COMMUNITY
End: 2019-06-25

## 2019-07-08 ENCOUNTER — FORM ENCOUNTER (OUTPATIENT)
Age: 54
End: 2019-07-08

## 2019-07-09 ENCOUNTER — APPOINTMENT (OUTPATIENT)
Age: 54
End: 2019-07-09
Payer: COMMERCIAL

## 2019-07-09 ENCOUNTER — OUTPATIENT (OUTPATIENT)
Dept: OUTPATIENT SERVICES | Facility: HOSPITAL | Age: 54
LOS: 1 days | Discharge: HOME | End: 2019-07-09
Payer: COMMERCIAL

## 2019-07-09 VITALS
BODY MASS INDEX: 31.07 KG/M2 | SYSTOLIC BLOOD PRESSURE: 131 MMHG | HEIGHT: 70 IN | TEMPERATURE: 97.5 F | DIASTOLIC BLOOD PRESSURE: 90 MMHG | WEIGHT: 217 LBS

## 2019-07-09 DIAGNOSIS — Z95.1 PRESENCE OF AORTOCORONARY BYPASS GRAFT: ICD-10-CM

## 2019-07-09 PROCEDURE — 99024 POSTOP FOLLOW-UP VISIT: CPT

## 2019-07-09 PROCEDURE — 71046 X-RAY EXAM CHEST 2 VIEWS: CPT | Mod: 26

## 2019-07-15 ENCOUNTER — RX RENEWAL (OUTPATIENT)
Age: 54
End: 2019-07-15

## 2019-08-07 ENCOUNTER — APPOINTMENT (OUTPATIENT)
Dept: UROLOGY | Facility: CLINIC | Age: 54
End: 2019-08-07
Payer: COMMERCIAL

## 2019-08-07 VITALS
HEIGHT: 70 IN | BODY MASS INDEX: 31.07 KG/M2 | DIASTOLIC BLOOD PRESSURE: 68 MMHG | WEIGHT: 217 LBS | SYSTOLIC BLOOD PRESSURE: 117 MMHG | HEART RATE: 64 BPM

## 2019-08-07 DIAGNOSIS — Z12.5 ENCOUNTER FOR SCREENING FOR MALIGNANT NEOPLASM OF PROSTATE: ICD-10-CM

## 2019-08-07 DIAGNOSIS — N50.812 LEFT TESTICULAR PAIN: ICD-10-CM

## 2019-08-07 PROCEDURE — 99204 OFFICE O/P NEW MOD 45 MIN: CPT

## 2019-08-07 NOTE — LETTER BODY
[Dear  ___] : Dear  [unfilled], [Please see my note below.] : Please see my note below. [Consult Letter:] : I had the pleasure of evaluating your patient, [unfilled]. [Consult Closing:] : Thank you very much for allowing me to participate in the care of this patient.  If you have any questions, please do not hesitate to contact me. [Sincerely,] : Sincerely, [FreeTextEntry2] :

## 2019-08-07 NOTE — HISTORY OF PRESENT ILLNESS
[Currently Experiencing ___] :  [unfilled] [Urinary Frequency] : urinary frequency [Weak Stream] : weak stream [Intermittency] : intermittency [Post-Void Dribbling] : post-void dribbling [Erectile Dysfunction] : Erectile Dysfunction [2] : 2 [Aching] : aching [Gradual] : gradual [Intermittent] : intermittently [___ Month(s) Ago] : [unfilled] month(s) ago [None] : No exacerbating factors are noted [FreeTextEntry1] : VESNA FERNÁNDEZ is a 53 year old male with a past medical history of CABG x 3 on 5/31/2019 and being followed by Dr. Hunt. Presents to the office today for LUTS x 2 years that has been worsening over time. Patient currently on Flomax 0.4 mg x 2 months and nocturia and straining have resolved. He states that urination has improved since starting Flomax. Currently experiencing weak stream, intermittency, and urinary frequency. Patient also has left testicular pain 2 out of 10 , minimal, denies trauma to genital area. In addition, patient has ED x 2 years, has weak erection and difficulty sustaining an erection. Also has difficulty penetrating. Patient does not have a PMD and does not recall having a PSA done. No history of prostate , renal, or bladder cancer. Quit smoking 9 weeks ago, smoked 1 pack a day x 20 years. \par \par IPSS = 22\par IPSS Score=22\par Incomplete Emptying=0\par Frequency=5\par Intermittency=5\par Urgency=5\par Weak Stream=5\par Straining=0\par Nocturia=2\par QOL=pleased\par \par scrotal sonogram from 2015: IMPRESSION: images reviewed by me\par 1. No evidence of testicular torsion. \par 2. Small left and trace right hydroceles. \par 3. Bilateral varicoceles, measuring up to to 0.32 cm with Valsalva \par bilaterally. \par 4. Subcentimeter left epididymal head cyst.  [Urinary Incontinence] : no urinary incontinence [Urinary Retention] : no urinary retention [Urinary Urgency] : no urinary urgency [Nocturia] : no nocturia [Straining] : no straining [Dysuria] : no dysuria [Hematuria - Gross] : no gross hematuria [Hematuria - Microscopic] : no microscopic hematuria [Flank Pain] : no flank pain [de-identified] : left testicle [de-identified] : 2 years ago

## 2019-08-07 NOTE — ASSESSMENT
[FreeTextEntry1] : VESNA FERNÁNDEZ is a 53 year old male with a past medical history of CABG x 3 on 5/31/2019 and being followed by Dr. Hunt. Presents to the office today for LUTS x 2 years that has been worsening over time. Patient currently on Flomax 0.4 mg x 2 months and nocturia and straining have resolved. He states that urination has improved since starting Flomax. Currently experiencing weak stream, intermittency, and urinary frequency. Patient also has left testicular pain 2 out of 10 , minimal, denies trauma to genital area. In addition, patient has ED x 2 years, has weak erection and difficulty sustaining an erection. Also has difficulty penetrating. Patient does not have a PMD and does not recall having a PSA done. No history of prostate , renal, or bladder cancer. Quit smoking 9 weeks ago, smoked 1 pack a day x 20 years. \par \par IPSS = 22\par IPSS Score=22\par Incomplete Emptying=0\par Frequency=5\par Intermittency=5\par Urgency=5\par Weak Stream=5\par Straining=0\par Nocturia=2\par QOL=pleased\par \par scrotal sonogram from 2015: IMPRESSION: images reviewed by me\par 1. No evidence of testicular torsion. \par 2. Small left and trace right hydroceles. \par 3. Bilateral varicoceles, measuring up to to 0.32 cm with Valsalva \par bilaterally. \par 4. Subcentimeter left epididymal head cyst.

## 2019-09-11 ENCOUNTER — APPOINTMENT (OUTPATIENT)
Dept: HEART AND VASCULAR | Facility: CLINIC | Age: 54
End: 2019-09-11
Payer: COMMERCIAL

## 2019-09-11 VITALS
WEIGHT: 225 LBS | HEART RATE: 71 BPM | SYSTOLIC BLOOD PRESSURE: 130 MMHG | HEIGHT: 70 IN | OXYGEN SATURATION: 97 % | BODY MASS INDEX: 32.21 KG/M2 | DIASTOLIC BLOOD PRESSURE: 84 MMHG

## 2019-09-11 DIAGNOSIS — Z82.49 FAMILY HISTORY OF ISCHEMIC HEART DISEASE AND OTHER DISEASES OF THE CIRCULATORY SYSTEM: ICD-10-CM

## 2019-09-11 PROCEDURE — 93000 ELECTROCARDIOGRAM COMPLETE: CPT

## 2019-09-11 PROCEDURE — 99214 OFFICE O/P EST MOD 30 MIN: CPT

## 2019-09-11 PROCEDURE — 36415 COLL VENOUS BLD VENIPUNCTURE: CPT

## 2019-09-11 NOTE — REVIEW OF SYSTEMS
[Urinary Frequency] : urinary frequency [Shortness Of Breath] : shortness of breath [see HPI] : see HPI [Joint Stiffness] : joint stiffness [Joint Pain] : joint pain [Negative] : Heme/Lymph

## 2019-09-11 NOTE — DISCUSSION/SUMMARY
[FreeTextEntry1] : stable exam, doing well check labs/psa\par discussed colonoscopy\par f/u echo to evaluate LVEF post CABG

## 2019-09-11 NOTE — PHYSICAL EXAM
[General Appearance - Well Developed] : well developed [Normal Appearance] : normal appearance [Well Groomed] : well groomed [General Appearance - Well Nourished] : well nourished [General Appearance - In No Acute Distress] : no acute distress [No Deformities] : no deformities [Normal Conjunctiva] : the conjunctiva exhibited no abnormalities [Eyelids - No Xanthelasma] : the eyelids demonstrated no xanthelasmas [Normal Oral Mucosa] : normal oral mucosa [No Oral Pallor] : no oral pallor [No Oral Cyanosis] : no oral cyanosis [Normal Jugular Venous V Waves Present] : normal jugular venous V waves present [Normal Jugular Venous A Waves Present] : normal jugular venous A waves present [No Jugular Venous Zamora A Waves] : no jugular venous zamora A waves [Respiration, Rhythm And Depth] : normal respiratory rhythm and effort [Auscultation Breath Sounds / Voice Sounds] : lungs were clear to auscultation bilaterally [Exaggerated Use Of Accessory Muscles For Inspiration] : no accessory muscle use [Heart Rate And Rhythm] : heart rate and rhythm were normal [Heart Sounds] : normal S1 and S2 [Murmurs] : no murmurs present [Abdomen Soft] : soft [Abdomen Tenderness] : non-tender [Abdomen Mass (___ Cm)] : no abdominal mass palpated [Abnormal Walk] : normal gait [Gait - Sufficient For Exercise Testing] : the gait was sufficient for exercise testing [Nail Clubbing] : no clubbing of the fingernails [Petechial Hemorrhages (___cm)] : no petechial hemorrhages [Cyanosis, Localized] : no localized cyanosis [] : no ischemic changes [FreeTextEntry1] : scar well healed [Oriented To Time, Place, And Person] : oriented to person, place, and time [Mood] : the mood was normal [Affect] : the affect was normal [No Anxiety] : not feeling anxious

## 2019-09-12 LAB
ALBUMIN SERPL ELPH-MCNC: 4.4 G/DL
ALP BLD-CCNC: 95 U/L
ALT SERPL-CCNC: 38 U/L
ANION GAP SERPL CALC-SCNC: 11 MMOL/L
APPEARANCE: CLEAR
AST SERPL-CCNC: 21 U/L
BACTERIA: NEGATIVE
BASOPHILS # BLD AUTO: 0.03 K/UL
BASOPHILS NFR BLD AUTO: 0.5 %
BILIRUB SERPL-MCNC: 0.3 MG/DL
BILIRUBIN URINE: NEGATIVE
BLOOD URINE: NEGATIVE
BUN SERPL-MCNC: 19 MG/DL
CALCIUM SERPL-MCNC: 9.7 MG/DL
CHLORIDE SERPL-SCNC: 106 MMOL/L
CHOLEST SERPL-MCNC: 119 MG/DL
CHOLEST/HDLC SERPL: 3.5 RATIO
CO2 SERPL-SCNC: 27 MMOL/L
COLOR: YELLOW
CREAT SERPL-MCNC: 0.99 MG/DL
EOSINOPHIL # BLD AUTO: 0.15 K/UL
EOSINOPHIL NFR BLD AUTO: 2.3 %
ESTIMATED AVERAGE GLUCOSE: 128 MG/DL
GLUCOSE QUALITATIVE U: NEGATIVE
GLUCOSE SERPL-MCNC: 137 MG/DL
HBA1C MFR BLD HPLC: 6.1 %
HCT VFR BLD CALC: 47.2 %
HDLC SERPL-MCNC: 34 MG/DL
HGB BLD-MCNC: 14.7 G/DL
HYALINE CASTS: 0 /LPF
IMM GRANULOCYTES NFR BLD AUTO: 0.2 %
KETONES URINE: NEGATIVE
LDLC SERPL CALC-MCNC: 51 MG/DL
LEUKOCYTE ESTERASE URINE: NEGATIVE
LYMPHOCYTES # BLD AUTO: 2.56 K/UL
LYMPHOCYTES NFR BLD AUTO: 38.6 %
MAN DIFF?: NORMAL
MCHC RBC-ENTMCNC: 28.2 PG
MCHC RBC-ENTMCNC: 31.1 GM/DL
MCV RBC AUTO: 90.6 FL
MICROSCOPIC-UA: NORMAL
MONOCYTES # BLD AUTO: 0.64 K/UL
MONOCYTES NFR BLD AUTO: 9.6 %
NEUTROPHILS # BLD AUTO: 3.25 K/UL
NEUTROPHILS NFR BLD AUTO: 48.8 %
NITRITE URINE: NEGATIVE
PH URINE: 5.5
PLATELET # BLD AUTO: 213 K/UL
POTASSIUM SERPL-SCNC: 4.9 MMOL/L
PROT SERPL-MCNC: 8.2 G/DL
PROTEIN URINE: NEGATIVE
PSA FREE FLD-MCNC: 24 %
PSA FREE SERPL-MCNC: 0.31 NG/ML
PSA SERPL-MCNC: 1.32 NG/ML
RBC # BLD: 5.21 M/UL
RBC # FLD: 14 %
RED BLOOD CELLS URINE: 3 /HPF
SODIUM SERPL-SCNC: 144 MMOL/L
SPECIFIC GRAVITY URINE: 1.02
SQUAMOUS EPITHELIAL CELLS: 0 /HPF
TRIGL SERPL-MCNC: 169 MG/DL
TSH SERPL-ACNC: 1.73 UIU/ML
UROBILINOGEN URINE: NORMAL
WBC # FLD AUTO: 6.64 K/UL
WHITE BLOOD CELLS URINE: 0 /HPF

## 2019-11-13 ENCOUNTER — APPOINTMENT (OUTPATIENT)
Dept: UROLOGY | Facility: CLINIC | Age: 54
End: 2019-11-13
Payer: COMMERCIAL

## 2019-11-13 VITALS
HEART RATE: 77 BPM | HEIGHT: 70 IN | BODY MASS INDEX: 32.21 KG/M2 | WEIGHT: 225 LBS | DIASTOLIC BLOOD PRESSURE: 94 MMHG | SYSTOLIC BLOOD PRESSURE: 159 MMHG

## 2019-11-13 DIAGNOSIS — K59.00 CONSTIPATION, UNSPECIFIED: ICD-10-CM

## 2019-11-13 DIAGNOSIS — R35.0 FREQUENCY OF MICTURITION: ICD-10-CM

## 2019-11-13 DIAGNOSIS — G47.9 SLEEP DISORDER, UNSPECIFIED: ICD-10-CM

## 2019-11-13 PROCEDURE — 99213 OFFICE O/P EST LOW 20 MIN: CPT

## 2019-11-13 NOTE — PHYSICAL EXAM
[General Appearance - Well Developed] : well developed [Normal Appearance] : normal appearance [General Appearance - Well Nourished] : well nourished [Well Groomed] : well groomed [General Appearance - In No Acute Distress] : no acute distress [Abdomen Soft] : soft [Abdomen Tenderness] : non-tender [Costovertebral Angle Tenderness] : no ~M costovertebral angle tenderness [Edema] : no peripheral edema [] : no respiratory distress [Respiration, Rhythm And Depth] : normal respiratory rhythm and effort [Exaggerated Use Of Accessory Muscles For Inspiration] : no accessory muscle use [Oriented To Time, Place, And Person] : oriented to person, place, and time [Affect] : the affect was normal [Mood] : the mood was normal [Not Anxious] : not anxious [No Focal Deficits] : no focal deficits [Normal Station and Gait] : the gait and station were normal for the patient's age [No Palpable Adenopathy] : no palpable adenopathy

## 2019-11-13 NOTE — HISTORY OF PRESENT ILLNESS
[Currently Experiencing ___] :  [unfilled] [Nocturia] : nocturia [Urinary Frequency] : urinary frequency [Weak Stream] : weak stream [Intermittency] : intermittency [Post-Void Dribbling] : post-void dribbling [Erectile Dysfunction] : Erectile Dysfunction [None] : None [FreeTextEntry1] : VESNA FERNÁNDEZ is a 54 year old male with a past medical history of CABG x 3 on 5/13/2019 and LUTS. Presents to the office today for a follow up, last seen on 8/7/2019. Patient currently on Flomax 0.4 mg and  symptoms have returned. He states he has frequency, weak stream, intermittency, and nocturia 2 x a  night. Testicular pain has resolved, US was done and is here to discuss results. As per ED, he does not have [place] criteria. \par ED is a secondary issue for him -- states that his sex drive has decreased\par \par PSA 1.32 ng/ml on 9/2019.\par \par US scrotum 9/11/2019\par -normal testicles bilaterally\par -small bilateral hydroceles and varicoceles\par -small bilateral epididymal head cysts [Urinary Incontinence] : no urinary incontinence [Urinary Retention] : no urinary retention [Urinary Urgency] : no urinary urgency [Straining] : no straining [Dysuria] : no dysuria [Hematuria - Gross] : no gross hematuria

## 2019-11-13 NOTE — LETTER BODY
[Dear  ___] : Dear  [unfilled], [Consult Letter:] : I had the pleasure of evaluating your patient, [unfilled]. [Please see my note below.] : Please see my note below. [Consult Closing:] : Thank you very much for allowing me to participate in the care of this patient.  If you have any questions, please do not hesitate to contact me. [Sincerely,] : Sincerely, [FreeTextEntry2] : Dr. Bunny Luque

## 2019-11-13 NOTE — ASSESSMENT
[FreeTextEntry1] : VESNA FERNÁNDEZ is a 54 year old male with a past medical history of CABG x 3 on 5/13/2019 and LUTS. Presents to the office today for a follow up, last seen on 8/7/2019. Patient currently on Flomax 0.4 mg and  symptoms have returned. He states he has frequency, weak stream, intermittency, and nocturia 2 x a  night. Testicular pain has resolved, US was done and is here to discuss results. As per ED, he does not have [place] criteria. \par ED is a secondary issue for him -- states that his sex drive has decreased\par \par PSA 1.32 ng/ml on 9/2019.\par \par US scrotum 9/11/2019\par -normal testicles bilaterally\par -small bilateral hydroceles and varicoceles\par -small bilateral epididymal head cysts

## 2020-01-08 ENCOUNTER — APPOINTMENT (OUTPATIENT)
Dept: UROLOGY | Facility: CLINIC | Age: 55
End: 2020-01-08

## 2021-01-21 NOTE — ED PROVIDER NOTE - NS ED ROS FT
92 Review of Systems    Constitutional: (-) fever   Eyes/ENT: (-) vision changes  Cardiovascular: (+) chest pain, (-) syncope (-) palpitations  Respiratory: (-) cough, (-) shortness of breath  Gastrointestinal: (-) vomiting, (-) diarrhea (-) abdominal pain  Genitourinary:  (-) dysuria   Musculoskeletal: (-) neck pain, (-) back pain, (-) leg pain/swelling  Integumentary: (-) rash, (-) edema  Neurological: (-) headache  Allergic/Immunologic: (-) pruritus

## 2021-02-26 ENCOUNTER — APPOINTMENT (OUTPATIENT)
Dept: HEART AND VASCULAR | Facility: CLINIC | Age: 56
End: 2021-02-26
Payer: COMMERCIAL

## 2021-02-26 VITALS
WEIGHT: 224 LBS | HEIGHT: 70 IN | SYSTOLIC BLOOD PRESSURE: 140 MMHG | HEART RATE: 70 BPM | BODY MASS INDEX: 32.07 KG/M2 | OXYGEN SATURATION: 98 % | DIASTOLIC BLOOD PRESSURE: 90 MMHG

## 2021-02-26 VITALS — TEMPERATURE: 96.7 F

## 2021-02-26 PROCEDURE — 99072 ADDL SUPL MATRL&STAF TM PHE: CPT

## 2021-02-26 PROCEDURE — 93000 ELECTROCARDIOGRAM COMPLETE: CPT

## 2021-02-26 PROCEDURE — 36415 COLL VENOUS BLD VENIPUNCTURE: CPT

## 2021-02-26 PROCEDURE — 99396 PREV VISIT EST AGE 40-64: CPT

## 2021-02-26 RX ORDER — SENNOSIDES 8.6 MG TABLETS 8.6 MG/1
8.6 TABLET ORAL
Qty: 30 | Refills: 0 | Status: DISCONTINUED | COMMUNITY
Start: 2019-06-11 | End: 2021-02-26

## 2021-02-26 RX ORDER — TAMSULOSIN HYDROCHLORIDE 0.4 MG/1
0.4 CAPSULE ORAL
Qty: 90 | Refills: 3 | Status: DISCONTINUED | COMMUNITY
End: 2021-02-26

## 2021-02-26 RX ORDER — FINASTERIDE 5 MG/1
5 TABLET, FILM COATED ORAL DAILY
Qty: 90 | Refills: 3 | Status: DISCONTINUED | COMMUNITY
Start: 2019-11-13 | End: 2021-02-26

## 2021-02-26 RX ORDER — DOCUSATE SODIUM 100 MG/1
100 CAPSULE, LIQUID FILLED ORAL
Refills: 0 | Status: DISCONTINUED | COMMUNITY
End: 2021-02-26

## 2021-02-26 NOTE — DISCUSSION/SUMMARY
[FreeTextEntry1] : stable exam, check labs/psa, colon utd\par discussed covid vax\par Urology eval, recent UTI nocturia

## 2021-02-26 NOTE — HISTORY OF PRESENT ILLNESS
[FreeTextEntry1] : feels well, recent treated uti, c/o frequency\par still smokes 1cig every 2 -3 days, considering stopping\par no etoh\par not depressed\par attends to [employer]\par fair diet some exercise

## 2021-02-26 NOTE — REVIEW OF SYSTEMS
[Shortness Of Breath] : no shortness of breath [Urinary Frequency] : urinary frequency [see HPI] : see HPI [Joint Pain] : joint pain [Joint Stiffness] : joint stiffness [Negative] : Heme/Lymph

## 2021-02-26 NOTE — PHYSICAL EXAM
[General Appearance - Well Developed] : well developed [Normal Appearance] : normal appearance [Well Groomed] : well groomed [General Appearance - Well Nourished] : well nourished [No Deformities] : no deformities [General Appearance - In No Acute Distress] : no acute distress [Normal Conjunctiva] : the conjunctiva exhibited no abnormalities [Eyelids - No Xanthelasma] : the eyelids demonstrated no xanthelasmas [Normal Oral Mucosa] : normal oral mucosa [No Oral Pallor] : no oral pallor [No Oral Cyanosis] : no oral cyanosis [Normal Jugular Venous A Waves Present] : normal jugular venous A waves present [Normal Jugular Venous V Waves Present] : normal jugular venous V waves present [No Jugular Venous Zamora A Waves] : no jugular venous zamora A waves [Respiration, Rhythm And Depth] : normal respiratory rhythm and effort [Exaggerated Use Of Accessory Muscles For Inspiration] : no accessory muscle use [Auscultation Breath Sounds / Voice Sounds] : lungs were clear to auscultation bilaterally [Heart Rate And Rhythm] : heart rate and rhythm were normal [Heart Sounds] : normal S1 and S2 [Murmurs] : no murmurs present [Abdomen Soft] : soft [Abdomen Tenderness] : non-tender [Abdomen Mass (___ Cm)] : no abdominal mass palpated [Abnormal Walk] : normal gait [Gait - Sufficient For Exercise Testing] : the gait was sufficient for exercise testing [Nail Clubbing] : no clubbing of the fingernails [Cyanosis, Localized] : no localized cyanosis [Petechial Hemorrhages (___cm)] : no petechial hemorrhages [] : no ischemic changes [FreeTextEntry1] : scar well healed [Oriented To Time, Place, And Person] : oriented to person, place, and time [Affect] : the affect was normal [Mood] : the mood was normal [No Anxiety] : not feeling anxious

## 2021-03-01 LAB
ALBUMIN SERPL ELPH-MCNC: 4.4 G/DL
ALP BLD-CCNC: 101 U/L
ALT SERPL-CCNC: 38 U/L
ANION GAP SERPL CALC-SCNC: 11 MMOL/L
APPEARANCE: CLEAR
AST SERPL-CCNC: 22 U/L
BACTERIA: NEGATIVE
BASOPHILS # BLD AUTO: 0.04 K/UL
BASOPHILS NFR BLD AUTO: 0.5 %
BILIRUB SERPL-MCNC: 0.6 MG/DL
BILIRUBIN URINE: NEGATIVE
BLOOD URINE: NEGATIVE
BUN SERPL-MCNC: 20 MG/DL
CALCIUM SERPL-MCNC: 9.8 MG/DL
CHLORIDE SERPL-SCNC: 106 MMOL/L
CHOLEST SERPL-MCNC: 114 MG/DL
CO2 SERPL-SCNC: 27 MMOL/L
COLOR: YELLOW
CREAT SERPL-MCNC: 1.12 MG/DL
EOSINOPHIL # BLD AUTO: 0.15 K/UL
EOSINOPHIL NFR BLD AUTO: 1.9 %
ESTIMATED AVERAGE GLUCOSE: 143 MG/DL
GLUCOSE QUALITATIVE U: NEGATIVE
GLUCOSE SERPL-MCNC: 129 MG/DL
HBA1C MFR BLD HPLC: 6.6 %
HCT VFR BLD CALC: 47 %
HDLC SERPL-MCNC: 27 MG/DL
HGB BLD-MCNC: 14.8 G/DL
HYALINE CASTS: 1 /LPF
IMM GRANULOCYTES NFR BLD AUTO: 0 %
KETONES URINE: NEGATIVE
LDLC SERPL CALC-MCNC: 41 MG/DL
LEUKOCYTE ESTERASE URINE: NEGATIVE
LYMPHOCYTES # BLD AUTO: 2.45 K/UL
LYMPHOCYTES NFR BLD AUTO: 31.7 %
MAN DIFF?: NORMAL
MCHC RBC-ENTMCNC: 29.2 PG
MCHC RBC-ENTMCNC: 31.5 GM/DL
MCV RBC AUTO: 92.7 FL
MICROSCOPIC-UA: NORMAL
MONOCYTES # BLD AUTO: 0.66 K/UL
MONOCYTES NFR BLD AUTO: 8.5 %
NEUTROPHILS # BLD AUTO: 4.44 K/UL
NEUTROPHILS NFR BLD AUTO: 57.4 %
NITRITE URINE: NEGATIVE
NONHDLC SERPL-MCNC: 87 MG/DL
PH URINE: 5.5
PLATELET # BLD AUTO: 245 K/UL
POTASSIUM SERPL-SCNC: 4.9 MMOL/L
PROT SERPL-MCNC: 7.9 G/DL
PROTEIN URINE: NEGATIVE
PSA SERPL-MCNC: 3.68 NG/ML
RBC # BLD: 5.07 M/UL
RBC # FLD: 13.5 %
RED BLOOD CELLS URINE: 1 /HPF
SODIUM SERPL-SCNC: 144 MMOL/L
SPECIFIC GRAVITY URINE: 1.02
SQUAMOUS EPITHELIAL CELLS: 1 /HPF
TRIGL SERPL-MCNC: 229 MG/DL
TSH SERPL-ACNC: 1.51 UIU/ML
UROBILINOGEN URINE: NORMAL
WBC # FLD AUTO: 7.74 K/UL
WHITE BLOOD CELLS URINE: 15 /HPF

## 2021-03-02 LAB — BACTERIA UR CULT: ABNORMAL

## 2021-03-05 ENCOUNTER — APPOINTMENT (OUTPATIENT)
Dept: UROLOGY | Facility: CLINIC | Age: 56
End: 2021-03-05
Payer: COMMERCIAL

## 2021-03-05 VITALS — TEMPERATURE: 98 F | SYSTOLIC BLOOD PRESSURE: 155 MMHG | DIASTOLIC BLOOD PRESSURE: 78 MMHG | HEART RATE: 76 BPM

## 2021-03-05 DIAGNOSIS — R39.9 UNSPECIFIED SYMPTOMS AND SIGNS INVOLVING THE GENITOURINARY SYSTEM: ICD-10-CM

## 2021-03-05 DIAGNOSIS — N39.0 URINARY TRACT INFECTION, SITE NOT SPECIFIED: ICD-10-CM

## 2021-03-05 DIAGNOSIS — B96.20 URINARY TRACT INFECTION, SITE NOT SPECIFIED: ICD-10-CM

## 2021-03-05 DIAGNOSIS — R39.15 URGENCY OF URINATION: ICD-10-CM

## 2021-03-05 PROCEDURE — 99072 ADDL SUPL MATRL&STAF TM PHE: CPT

## 2021-03-05 PROCEDURE — 99214 OFFICE O/P EST MOD 30 MIN: CPT

## 2021-03-05 RX ORDER — SILDENAFIL 100 MG/1
100 TABLET, FILM COATED ORAL
Qty: 6 | Refills: 0 | Status: DISCONTINUED | COMMUNITY
Start: 2021-03-05 | End: 2021-03-05

## 2021-03-05 NOTE — PHYSICAL EXAM
[General Appearance - Well Developed] : well developed [General Appearance - Well Nourished] : well nourished [Heart Rate And Rhythm] : Heart rate and rhythm were normal [] : no respiratory distress [Urethral Meatus] : meatus normal [Penis Abnormality] : normal circumcised penis [Scrotum] : the scrotum was normal [Epididymis] : the epididymides were normal [Testes Tenderness] : no tenderness of the testes [Testes Mass (___cm)] : there were no testicular masses [Rectal Exam - Rectum] : digital rectal exam was normal [Prostate Enlargement] : the prostate was not enlarged [Prostate Tenderness] : the prostate was not tender [No Prostate Nodules] : no prostate nodules [Prostate Size ___ gm] : prostate size [unfilled] gm [Normal Station and Gait] : the gait and station were normal for the patient's age [Skin Color & Pigmentation] : normal skin color and pigmentation [No Focal Deficits] : no focal deficits [Oriented To Time, Place, And Person] : oriented to person, place, and time [Not Anxious] : not anxious [No Palpable Adenopathy] : no palpable adenopathy [Abdomen Soft] : soft [Abdomen Tenderness] : non-tender [Costovertebral Angle Tenderness] : no ~M costovertebral angle tenderness

## 2021-03-05 NOTE — ASSESSMENT
[FreeTextEntry1] : UTI E-coli on abx now\par Repeat UA UCx now\par Incomplete bladder emptying, +\par Renal US now\par Restart now on Flomax 0.4 mg and Finasteride daily\par PSA 3.7 at time of infection\par f/u in 6 -8 weeks\par \par ED\par not a Viagra Candidate due to Isosorbide therapy\par

## 2021-03-05 NOTE — HISTORY OF PRESENT ILLNESS
[Nocturia] : nocturia [FreeTextEntry1] : 55M, s/p triple bypass on , here to establish care. Patient reports about 9 year hx of BPH symptoms with LUTS that are gradually gotten worse. He had an MI about a year ago, stayed in the hospital and had a krause placed, with Rx given for Tamsulosin 0.4 mg, but he took it for 6 months. He was referred to Urologist, but only with him has discussed ED issues and was offered Viagra. He does not report being on Finasteride, but per notes he was prescribed. \par \par Says had UTI 2 weeks ago with fever and cloudy urine, U/Cx - E-Coli, no flank pain. He went to Urgent Care with abx course. Dr Castellanos placed him on repeat course of abx this time Cipro 500 mg PO BID for 7 days, still on it, and asked to see Urologist. His symptoms are characterized by decreased FOS, straining, long time to empty, nocturia 3-4, frequency q 15 -20 mts, urgency constant. PVR today is 236 mL.\par \par PSA 3.68, 03/2021 (prior 1.32, 10/2019)\par UA - 15 WBC, 1 RBC\par sCr - 1.1 02/26/21 \par \par ED - difficult with getting and keeping erections. Interested in PDE5i. \par On isosorbide, not a candidate. May be interested in injections Trimix\par \par Denies gross hematuria or prostatitis. C/O constipation.\par \par PMHx: DM II,  A1c - 6.6%\par FMHx: Denies PCa or other Uro Ca\par Social: Maintenance water worker, smoker\par

## 2021-03-08 LAB — BACTERIA UR CULT: NORMAL

## 2021-03-17 LAB
APPEARANCE: CLEAR
BACTERIA: NEGATIVE
BILIRUBIN URINE: NEGATIVE
BLOOD URINE: NEGATIVE
COLOR: YELLOW
GLUCOSE QUALITATIVE U: NEGATIVE
HYALINE CASTS: 1 /LPF
KETONES URINE: NEGATIVE
LEUKOCYTE ESTERASE URINE: NEGATIVE
MICROSCOPIC-UA: NORMAL
NITRITE URINE: NEGATIVE
PH URINE: 6
PROTEIN URINE: ABNORMAL
RED BLOOD CELLS URINE: 1 /HPF
SPECIFIC GRAVITY URINE: 1.02
SQUAMOUS EPITHELIAL CELLS: 1 /HPF
UROBILINOGEN URINE: NORMAL
WHITE BLOOD CELLS URINE: 8 /HPF

## 2021-03-19 ENCOUNTER — NON-APPOINTMENT (OUTPATIENT)
Age: 56
End: 2021-03-19

## 2021-04-09 ENCOUNTER — APPOINTMENT (OUTPATIENT)
Dept: UROLOGY | Facility: CLINIC | Age: 56
End: 2021-04-09
Payer: COMMERCIAL

## 2021-04-09 VITALS — HEART RATE: 71 BPM | SYSTOLIC BLOOD PRESSURE: 131 MMHG | TEMPERATURE: 95 F | DIASTOLIC BLOOD PRESSURE: 85 MMHG

## 2021-04-09 DIAGNOSIS — N52.9 MALE ERECTILE DYSFUNCTION, UNSPECIFIED: ICD-10-CM

## 2021-04-09 PROCEDURE — 99213 OFFICE O/P EST LOW 20 MIN: CPT

## 2021-04-09 PROCEDURE — 99072 ADDL SUPL MATRL&STAF TM PHE: CPT

## 2021-04-09 NOTE — HISTORY OF PRESENT ILLNESS
[FreeTextEntry1] : 55M with cardiac co morbidities\par here for BPH with LUTS f/u\par on Tamsulosin 0.4 m,g cap - effective but persistent symptoms\par on Finasteride  5 mg tab - \par Patient reports improvement in frequency and urgency. \par Denies UTI, dysuria, hematuria, fever and chills. \par patient feels better.\par \par Bhumika US (mar 2021) - normal \par PSA 3.68, 03/2021 (prior 1.32, 10/2019)\par UA - 15 WBC, 1 RBC\par sCr - 1.1 02/26/21 \par \par

## 2021-04-09 NOTE — ASSESSMENT
[FreeTextEntry1] : BPH \par Continue Finesteride 5 mg tab\par increase flomax to 0.8\par \par ED \par on nitrates  - cannot start Sildenafil at this time\par F/U in 6 monthsn

## 2021-04-14 NOTE — PRE-ANESTHESIA EVALUATION ADULT - NSANTHOBSERVEDRD_ENT_A_CORE
Patient has positive Candida based on IgG. We will treat with Diflucan 100 mg x 10 days. Patient is not on a statin. Liver enzymes within normal limits.   We will recheck fungal panel in 6 weeks No

## 2021-11-11 NOTE — PHYSICAL THERAPY INITIAL EVALUATION ADULT - ASSISTIVE DEVICE FOR TRANSFER: STAND/SIT, REHAB EVAL
Initial Anesthesia Post-op Note    Patient: Bandar Lewis  Procedure(s) Performed: CYSTOSCOPY, WITH TRANSURETHRAL RESECTION OF BLADDER TUMOR (TURBT) WITH 2G INTRAVESICAL GEMCITABINE  Anesthesia type: General    Vitals Value Taken Time   Temp 97.8F 11/11/21 0832   Pulse 58 11/11/21 0822   Resp 10 11/11/21 0822   SpO2 100 % 11/11/21 0822   /77 11/11/21 0822         Patient Location: PACU Phase 1  Post-op Vital Signs:stable  Level of Consciousness: awake, oriented, alert and participates in exam  Respiratory Status: spontaneous ventilation and unassisted  Cardiovascular blood pressure returned to baseline  Hydration: euvolemic  Pain Management: adequately controlled and well controlled  Handoff: Handoff to receiving clinician was performed and questions were answered  Vomiting: none  Nausea: None  Airway Patency:patent  Post-op Assessment: awake, alert, appropriately conversant, or baseline, no complications, patient tolerated procedure well with no complications and no evidence of recall  Comments: Patient did well, awake, comfortable, VSS.      No complications documented.   cardiac walker

## 2022-11-02 NOTE — H&P ADULT - NSICDXPASTSURGICALHX_GEN_ALL_CORE_FT
The patient has been re-examined and I agree with the above assessment or I updated with my findings.
No significant past surgical history

## 2023-01-13 ENCOUNTER — APPOINTMENT (OUTPATIENT)
Dept: UROLOGY | Facility: CLINIC | Age: 58
End: 2023-01-13
Payer: COMMERCIAL

## 2023-01-13 VITALS — DIASTOLIC BLOOD PRESSURE: 84 MMHG | HEART RATE: 70 BPM | SYSTOLIC BLOOD PRESSURE: 151 MMHG | TEMPERATURE: 97.8 F

## 2023-01-13 DIAGNOSIS — N40.0 BENIGN PROSTATIC HYPERPLASIA WITHOUT LOWER URINARY TRACT SYMPMS: ICD-10-CM

## 2023-01-13 PROCEDURE — 99214 OFFICE O/P EST MOD 30 MIN: CPT

## 2023-01-13 RX ORDER — SILDENAFIL 100 MG/1
100 TABLET, FILM COATED ORAL
Qty: 10 | Refills: 6 | Status: ACTIVE | COMMUNITY
Start: 2023-01-13 | End: 1900-01-01

## 2023-01-16 LAB
APPEARANCE: CLEAR
BACTERIA UR CULT: NORMAL
BACTERIA: NEGATIVE
BILIRUBIN URINE: NEGATIVE
BLOOD URINE: NEGATIVE
COLOR: YELLOW
GLUCOSE QUALITATIVE U: NEGATIVE
HYALINE CASTS: 3 /LPF
KETONES URINE: NEGATIVE
LEUKOCYTE ESTERASE URINE: NEGATIVE
MICROSCOPIC-UA: NORMAL
NITRITE URINE: NEGATIVE
PH URINE: 5.5
PROTEIN URINE: NORMAL
PSA SERPL-MCNC: 2.47 NG/ML
RED BLOOD CELLS URINE: 3 /HPF
SPECIFIC GRAVITY URINE: 1.03
SQUAMOUS EPITHELIAL CELLS: 1 /HPF
UROBILINOGEN URINE: NORMAL
WHITE BLOOD CELLS URINE: 4 /HPF

## 2023-01-16 NOTE — HISTORY OF PRESENT ILLNESS
[FreeTextEntry1] : 58 yo. Cardiac comorbidities\par Returns for follow up\par \par Since April 2019 he hasn't taken any medication.\par Weak FOS. Nocturia x 2.\par Urgency, frequency every 15 mins.\par Had 2-3 UTI. No hematuria. \par No fever or chills.\par Smokes 9 a day. \par \par Last PSA 2021 3.68.\par \par Also ED\par Not taking nitrates anymore, reviewed with patient. \par Has erections 1/10, can't maintain. \par Every once in a while morning erections. \par Never tried PDE5i. \par \par No family hx of prostate cancer. \par Doesn't want JUAN PABLO today. \par \par

## 2023-01-16 NOTE — ASSESSMENT
[FreeTextEntry1] : BPH\par Restart flomax 0.8 + finasteride 5 mg. \par PSA UA UC \par ED\par Trial sildenafil 100 mg. \par f/u 2 months

## 2023-01-19 ENCOUNTER — APPOINTMENT (OUTPATIENT)
Dept: HEART AND VASCULAR | Facility: CLINIC | Age: 58
End: 2023-01-19
Payer: COMMERCIAL

## 2023-01-19 VITALS
TEMPERATURE: 98.1 F | HEIGHT: 70 IN | HEART RATE: 63 BPM | BODY MASS INDEX: 32.07 KG/M2 | WEIGHT: 224 LBS | OXYGEN SATURATION: 96 % | SYSTOLIC BLOOD PRESSURE: 146 MMHG | DIASTOLIC BLOOD PRESSURE: 84 MMHG

## 2023-01-19 PROCEDURE — 93000 ELECTROCARDIOGRAM COMPLETE: CPT

## 2023-01-19 PROCEDURE — 36415 COLL VENOUS BLD VENIPUNCTURE: CPT

## 2023-01-19 PROCEDURE — 99396 PREV VISIT EST AGE 40-64: CPT

## 2023-01-19 NOTE — HISTORY OF PRESENT ILLNESS
[FreeTextEntry1] : feels well\par fair diet\par some exercise\par discussed tob use/ not ready\par occ etoh\par not depressed

## 2023-01-19 NOTE — DISCUSSION/SUMMARY
[FreeTextEntry1] : stable exam, labs in office\par psa utd\par colon due\par covid vax done [EKG obtained to assist in diagnosis and management of assessed problem(s)] : EKG obtained to assist in diagnosis and management of assessed problem(s)

## 2023-01-19 NOTE — REVIEW OF SYSTEMS
[Change In The Stool] : change in stool [Constipation] : constipation [Urinary Frequency] : urinary frequency [Erectile Dysfunction] : erectile dysfunction [Nocturia] : nocturia [Negative] : Heme/Lymph

## 2023-01-20 LAB
ALBUMIN SERPL ELPH-MCNC: 4.4 G/DL
ALP BLD-CCNC: 107 U/L
ALT SERPL-CCNC: 44 U/L
ANION GAP SERPL CALC-SCNC: 12 MMOL/L
AST SERPL-CCNC: 26 U/L
BASOPHILS # BLD AUTO: 0.03 K/UL
BASOPHILS NFR BLD AUTO: 0.4 %
BILIRUB SERPL-MCNC: 0.5 MG/DL
BUN SERPL-MCNC: 25 MG/DL
CALCIUM SERPL-MCNC: 9.4 MG/DL
CHLORIDE SERPL-SCNC: 106 MMOL/L
CHOLEST SERPL-MCNC: 119 MG/DL
CO2 SERPL-SCNC: 26 MMOL/L
CREAT SERPL-MCNC: 1.11 MG/DL
EGFR: 76 ML/MIN/1.73M2
EOSINOPHIL # BLD AUTO: 0.3 K/UL
EOSINOPHIL NFR BLD AUTO: 4.3 %
ESTIMATED AVERAGE GLUCOSE: 131 MG/DL
GLUCOSE SERPL-MCNC: 94 MG/DL
HBA1C MFR BLD HPLC: 6.2 %
HCT VFR BLD CALC: 43.7 %
HDLC SERPL-MCNC: 39 MG/DL
HGB BLD-MCNC: 14.4 G/DL
IMM GRANULOCYTES NFR BLD AUTO: 0.3 %
LDLC SERPL CALC-MCNC: 39 MG/DL
LYMPHOCYTES # BLD AUTO: 2.51 K/UL
LYMPHOCYTES NFR BLD AUTO: 36 %
MAN DIFF?: NORMAL
MCHC RBC-ENTMCNC: 30.4 PG
MCHC RBC-ENTMCNC: 33 GM/DL
MCV RBC AUTO: 92.2 FL
MONOCYTES # BLD AUTO: 0.64 K/UL
MONOCYTES NFR BLD AUTO: 9.2 %
NEUTROPHILS # BLD AUTO: 3.47 K/UL
NEUTROPHILS NFR BLD AUTO: 49.8 %
NONHDLC SERPL-MCNC: 80 MG/DL
PLATELET # BLD AUTO: 183 K/UL
POTASSIUM SERPL-SCNC: 4.1 MMOL/L
PROT SERPL-MCNC: 8.1 G/DL
RBC # BLD: 4.74 M/UL
RBC # FLD: 13.2 %
SODIUM SERPL-SCNC: 144 MMOL/L
TRIGL SERPL-MCNC: 202 MG/DL
TSH SERPL-ACNC: 1.55 UIU/ML
WBC # FLD AUTO: 6.97 K/UL

## 2023-03-10 ENCOUNTER — APPOINTMENT (OUTPATIENT)
Dept: UROLOGY | Facility: CLINIC | Age: 58
End: 2023-03-10
Payer: COMMERCIAL

## 2023-03-10 VITALS — SYSTOLIC BLOOD PRESSURE: 158 MMHG | DIASTOLIC BLOOD PRESSURE: 90 MMHG | HEART RATE: 65 BPM | TEMPERATURE: 98.2 F

## 2023-03-10 PROCEDURE — 99214 OFFICE O/P EST MOD 30 MIN: CPT

## 2023-03-13 NOTE — ASSESSMENT
[FreeTextEntry1] : BPH\par Continue flomax 0.8 + finasteride 5 mg\par ED.\par TRial sildenafil 100 mg\par \par f/u in a year.

## 2023-03-13 NOTE — HISTORY OF PRESENT ILLNESS
[FreeTextEntry1] : 56 yo. Cardiac comorbidities\par Returns for follow up\par \par BPH\par Currently on flomax 0.8 + finasteride 5 mg\par Feels better. Good FOS.\par No more urgency or frequency\par Retrograde ejaculation.\par \par ED.\par Didn't try sildenafil 100 mg\par Will try.\par \par Happy overall\par No more complaints.

## 2023-07-14 ENCOUNTER — RX RENEWAL (OUTPATIENT)
Age: 58
End: 2023-07-14

## 2023-07-19 NOTE — PATIENT PROFILE ADULT - RESOURCE/ENVIRONMENTAL CONCERNS
Odomzo Counseling- I discussed with the patient the risks of Odomzo including but not limited to nausea, vomiting, diarrhea, constipation, weight loss, changes in the sense of taste, decreased appetite, muscle spasms, and hair loss.  The patient verbalized understanding of the proper use and possible adverse effects of Odomzo.  All of the patient's questions and concerns were addressed. none

## 2024-01-19 ENCOUNTER — NON-APPOINTMENT (OUTPATIENT)
Age: 59
End: 2024-01-19

## 2024-01-19 ENCOUNTER — RX RENEWAL (OUTPATIENT)
Age: 59
End: 2024-01-19

## 2024-01-19 ENCOUNTER — APPOINTMENT (OUTPATIENT)
Dept: HEART AND VASCULAR | Facility: CLINIC | Age: 59
End: 2024-01-19
Payer: COMMERCIAL

## 2024-01-19 VITALS
HEART RATE: 85 BPM | HEIGHT: 70 IN | TEMPERATURE: 97.1 F | OXYGEN SATURATION: 95 % | WEIGHT: 226 LBS | SYSTOLIC BLOOD PRESSURE: 136 MMHG | BODY MASS INDEX: 32.35 KG/M2 | DIASTOLIC BLOOD PRESSURE: 84 MMHG

## 2024-01-19 DIAGNOSIS — Z00.00 ENCOUNTER FOR GENERAL ADULT MEDICAL EXAMINATION W/OUT ABNORMAL FINDINGS: ICD-10-CM

## 2024-01-19 PROCEDURE — 99396 PREV VISIT EST AGE 40-64: CPT

## 2024-01-19 PROCEDURE — 36415 COLL VENOUS BLD VENIPUNCTURE: CPT

## 2024-01-19 PROCEDURE — 93000 ELECTROCARDIOGRAM COMPLETE: CPT

## 2024-01-19 RX ORDER — FINASTERIDE 5 MG/1
5 TABLET, FILM COATED ORAL DAILY
Qty: 90 | Refills: 3 | Status: DISCONTINUED | COMMUNITY
Start: 2023-01-13 | End: 2024-01-19

## 2024-01-19 RX ORDER — ISOSORBIDE MONONITRATE 30 MG/1
30 TABLET, EXTENDED RELEASE ORAL DAILY
Qty: 30 | Refills: 0 | Status: DISCONTINUED | COMMUNITY
Start: 1900-01-01 | End: 2024-01-19

## 2024-01-19 RX ORDER — METOPROLOL TARTRATE 25 MG/1
25 TABLET, FILM COATED ORAL
Qty: 180 | Refills: 1 | Status: ACTIVE | COMMUNITY
Start: 2023-07-14 | End: 1900-01-01

## 2024-01-19 RX ORDER — TAMSULOSIN HYDROCHLORIDE 0.4 MG/1
0.4 CAPSULE ORAL
Qty: 180 | Refills: 3 | Status: ACTIVE | COMMUNITY
Start: 2023-01-13 | End: 1900-01-01

## 2024-01-19 NOTE — REVIEW OF SYSTEMS
[SOB] : shortness of breath [Chest Discomfort] : chest discomfort [Change In The Stool] : no change in stool [Constipation] : no constipation [Urinary Frequency] : urinary frequency [Nocturia] : nocturia [Negative] : Heme/Lymph

## 2024-01-19 NOTE — DISCUSSION/SUMMARY
[FreeTextEntry1] : stable exam, labs in office pas and colon due  c/o cp/ sob 5year post cabg recommend nuclear stress ane echo eval [EKG obtained to assist in diagnosis and management of assessed problem(s)] : EKG obtained to assist in diagnosis and management of assessed problem(s)

## 2024-01-22 LAB
ALBUMIN SERPL ELPH-MCNC: 4.7 G/DL
ALP BLD-CCNC: 91 U/L
ALT SERPL-CCNC: 37 U/L
ANION GAP SERPL CALC-SCNC: 14 MMOL/L
APPEARANCE: CLEAR
AST SERPL-CCNC: 25 U/L
BASOPHILS # BLD AUTO: 0.02 K/UL
BASOPHILS NFR BLD AUTO: 0.3 %
BILIRUB SERPL-MCNC: 0.4 MG/DL
BILIRUBIN URINE: NEGATIVE
BLOOD URINE: NEGATIVE
BUN SERPL-MCNC: 24 MG/DL
CALCIUM SERPL-MCNC: 9.4 MG/DL
CHLORIDE SERPL-SCNC: 104 MMOL/L
CHOLEST SERPL-MCNC: 202 MG/DL
CO2 SERPL-SCNC: 27 MMOL/L
COLOR: YELLOW
CREAT SERPL-MCNC: 1.28 MG/DL
CREAT SPEC-SCNC: 238 MG/DL
EGFR: 64 ML/MIN/1.73M2
EOSINOPHIL # BLD AUTO: 0.21 K/UL
EOSINOPHIL NFR BLD AUTO: 2.6 %
ESTIMATED AVERAGE GLUCOSE: 134 MG/DL
GLUCOSE QUALITATIVE U: NEGATIVE MG/DL
GLUCOSE SERPL-MCNC: 152 MG/DL
HBA1C MFR BLD HPLC: 6.3 %
HCT VFR BLD CALC: 47.7 %
HDLC SERPL-MCNC: 32 MG/DL
HGB BLD-MCNC: 16 G/DL
HIV1+2 AB SPEC QL IA.RAPID: NONREACTIVE
IMM GRANULOCYTES NFR BLD AUTO: 0.3 %
KETONES URINE: NEGATIVE MG/DL
LDLC SERPL CALC-MCNC: 110 MG/DL
LEUKOCYTE ESTERASE URINE: NEGATIVE
LYMPHOCYTES # BLD AUTO: 2.49 K/UL
LYMPHOCYTES NFR BLD AUTO: 31.2 %
MAN DIFF?: NORMAL
MCHC RBC-ENTMCNC: 30.2 PG
MCHC RBC-ENTMCNC: 33.5 GM/DL
MCV RBC AUTO: 90 FL
MICROALBUMIN 24H UR DL<=1MG/L-MCNC: 5.4 MG/DL
MICROALBUMIN/CREAT 24H UR-RTO: 23 MG/G
MONOCYTES # BLD AUTO: 0.65 K/UL
MONOCYTES NFR BLD AUTO: 8.1 %
NEUTROPHILS # BLD AUTO: 4.6 K/UL
NEUTROPHILS NFR BLD AUTO: 57.5 %
NITRITE URINE: NEGATIVE
NONHDLC SERPL-MCNC: 170 MG/DL
PH URINE: 5.5
PLATELET # BLD AUTO: 198 K/UL
POTASSIUM SERPL-SCNC: 4.3 MMOL/L
PROT SERPL-MCNC: 8.3 G/DL
PROTEIN URINE: NORMAL MG/DL
PSA SERPL-MCNC: 1.59 NG/ML
RBC # BLD: 5.3 M/UL
RBC # FLD: 13.3 %
SODIUM SERPL-SCNC: 145 MMOL/L
SPECIFIC GRAVITY URINE: 1.03
TRIGL SERPL-MCNC: 344 MG/DL
TSH SERPL-ACNC: 1.4 UIU/ML
UROBILINOGEN URINE: 0.2 MG/DL
WBC # FLD AUTO: 7.99 K/UL

## 2024-02-05 ENCOUNTER — APPOINTMENT (OUTPATIENT)
Dept: HEART AND VASCULAR | Facility: CLINIC | Age: 59
End: 2024-02-05
Payer: COMMERCIAL

## 2024-02-05 VITALS — WEIGHT: 226 LBS | BODY MASS INDEX: 32.35 KG/M2 | HEIGHT: 70 IN

## 2024-02-05 DIAGNOSIS — R07.9 CHEST PAIN, UNSPECIFIED: ICD-10-CM

## 2024-02-05 DIAGNOSIS — I25.10 ATHEROSCLEROTIC HEART DISEASE OF NATIVE CORONARY ARTERY W/OUT ANGINA PECTORIS: ICD-10-CM

## 2024-02-05 DIAGNOSIS — Z95.1 PRESENCE OF AORTOCORONARY BYPASS GRAFT: ICD-10-CM

## 2024-02-05 PROCEDURE — 93306 TTE W/DOPPLER COMPLETE: CPT

## 2024-02-05 PROCEDURE — 78452 HT MUSCLE IMAGE SPECT MULT: CPT

## 2024-02-05 PROCEDURE — 93015 CV STRESS TEST SUPVJ I&R: CPT

## 2024-02-05 PROCEDURE — A9500: CPT

## 2024-02-05 PROCEDURE — 99214 OFFICE O/P EST MOD 30 MIN: CPT | Mod: 25

## 2024-02-05 RX ORDER — ATORVASTATIN CALCIUM 40 MG/1
40 TABLET, FILM COATED ORAL
Qty: 30 | Refills: 5 | Status: ACTIVE | COMMUNITY
Start: 1900-01-01 | End: 1900-01-01

## 2024-02-05 NOTE — DISCUSSION/SUMMARY
[FreeTextEntry1] : stable exam CAD/cabg negative non invasive cardiac evaluation results discussed/ reassurance given continue secondary prevention/ smoking cessation

## 2024-03-05 ENCOUNTER — APPOINTMENT (OUTPATIENT)
Dept: UROLOGY | Facility: CLINIC | Age: 59
End: 2024-03-05

## 2024-03-10 NOTE — PHYSICAL EXAM
[Auscultation Breath Sounds / Voice Sounds] : lungs were clear to auscultation bilaterally [Heart Rate And Rhythm] : heart rate was normal and rhythm regular [Heart Sounds] : normal S1 and S2 Statement Selected [Heart Sounds Gallop] : no gallops [Murmurs] : no murmurs [Heart Sounds Pericardial Friction Rub] : no pericardial rub [Examination Of The Chest] : the chest was normal in appearance [Chest Visual Inspection Thoracic Asymmetry] : no chest asymmetry [Diminished Respiratory Excursion] : normal chest expansion [2+] : left 2+ [Bowel Sounds] : normal bowel sounds [Abdomen Tenderness] : non-tender [Abdomen Soft] : soft [Abdomen Mass (___ Cm)] : no abdominal mass palpated [Abnormal Walk] : normal gait [Nail Clubbing] : no clubbing  or cyanosis of the fingernails [Musculoskeletal - Swelling] : no joint swelling seen [Motor Tone] : muscle strength and tone were normal [Skin Turgor] : normal skin turgor [] : no rash [No Focal Deficits] : no focal deficits [Oriented To Time, Place, And Person] : oriented to person, place, and time [Impaired Insight] : insight and judgment were intact [Affect] : the affect was normal [FreeTextEntry1] : LUE radial harvest site incision D/I w/ some erythema and swelling - RLE incision site C/D/I w/ distal swelling, warmth, ecchymosis, and resolving hematoma noted

## 2024-03-15 NOTE — H&P ADULT - NSHPLABSRESULTS_GEN_ALL_CORE
Called patient to discuss below alert. Unable to reach patient. Left message instructing patient to call back to discuss if symptomatic.   Labs:                        15.2   9.59  )-----------( 211      ( 29 May 2019 01:58 )             45.4             05-29    143  |  102  |  22<H>  ----------------------------<  142<H>  4.2   |  27  |  1.1    Ca    9.7      29 May 2019 01:58  Mg     1.9     05-29    TPro  8.1<H>  /  Alb  4.4  /  TBili  0.3  /  DBili  x   /  AST  25  /  ALT  45<H>  /  AlkPhos  96  05-29    LIVER FUNCTIONS - ( 29 May 2019 01:58 )  Alb: 4.4 g/dL / Pro: 8.1 g/dL / ALK PHOS: 96 U/L / ALT: 45 U/L / AST: 25 U/L / GGT: x         PT/INR - ( 29 May 2019 01:58 )   PT: 12.00 sec;   INR: 1.04 ratio    PTT - ( 29 May 2019 01:58 )  PTT:29.3 sec  CARDIAC MARKERS ( 29 May 2019 01:58 )  x     / 0.02 ng/mL / x     / x     / x unknown

## 2024-07-29 ENCOUNTER — RX RENEWAL (OUTPATIENT)
Age: 59
End: 2024-07-29

## 2025-01-23 ENCOUNTER — RX RENEWAL (OUTPATIENT)
Age: 60
End: 2025-01-23

## 2025-08-01 ENCOUNTER — RX RENEWAL (OUTPATIENT)
Age: 60
End: 2025-08-01

## 2025-09-03 ENCOUNTER — APPOINTMENT (OUTPATIENT)
Dept: HEART AND VASCULAR | Facility: CLINIC | Age: 60
End: 2025-09-03
Payer: COMMERCIAL

## 2025-09-03 VITALS
WEIGHT: 219 LBS | DIASTOLIC BLOOD PRESSURE: 100 MMHG | BODY MASS INDEX: 31.35 KG/M2 | SYSTOLIC BLOOD PRESSURE: 158 MMHG | TEMPERATURE: 98 F | OXYGEN SATURATION: 99 % | HEIGHT: 70 IN | HEART RATE: 76 BPM

## 2025-09-03 DIAGNOSIS — E78.5 HYPERLIPIDEMIA, UNSPECIFIED: ICD-10-CM

## 2025-09-03 DIAGNOSIS — I25.10 ATHEROSCLEROTIC HEART DISEASE OF NATIVE CORONARY ARTERY W/OUT ANGINA PECTORIS: ICD-10-CM

## 2025-09-03 DIAGNOSIS — Z95.1 PRESENCE OF AORTOCORONARY BYPASS GRAFT: ICD-10-CM

## 2025-09-03 LAB
ALBUMIN SERPL ELPH-MCNC: 4.5 G/DL
ALP BLD-CCNC: 85 U/L
ALT SERPL-CCNC: 48 U/L
ANION GAP SERPL CALC-SCNC: 13 MMOL/L
AST SERPL-CCNC: 32 U/L
BILIRUB SERPL-MCNC: 0.5 MG/DL
BUN SERPL-MCNC: 21 MG/DL
CALCIUM SERPL-MCNC: 9.7 MG/DL
CHLORIDE SERPL-SCNC: 106 MMOL/L
CHOLEST SERPL-MCNC: 175 MG/DL
CO2 SERPL-SCNC: 24 MMOL/L
CREAT SERPL-MCNC: 0.95 MG/DL
EGFRCR SERPLBLD CKD-EPI 2021: 91 ML/MIN/1.73M2
GLUCOSE SERPL-MCNC: 196 MG/DL
HDLC SERPL-MCNC: 33 MG/DL
LDLC SERPL-MCNC: 112 MG/DL
NONHDLC SERPL-MCNC: 142 MG/DL
POTASSIUM SERPL-SCNC: 4.4 MMOL/L
PROT SERPL-MCNC: 7.7 G/DL
PSA SERPL-MCNC: 2.25 NG/ML
SODIUM SERPL-SCNC: 142 MMOL/L
TRIGL SERPL-MCNC: 173 MG/DL
TSH SERPL-ACNC: 1.98 UIU/ML

## 2025-09-03 PROCEDURE — 93000 ELECTROCARDIOGRAM COMPLETE: CPT

## 2025-09-03 PROCEDURE — 99214 OFFICE O/P EST MOD 30 MIN: CPT | Mod: 25

## 2025-09-04 ENCOUNTER — NON-APPOINTMENT (OUTPATIENT)
Age: 60
End: 2025-09-04

## 2025-09-04 LAB
BASOPHILS # BLD AUTO: 0.02 K/UL
BASOPHILS NFR BLD AUTO: 0.3 %
EOSINOPHIL # BLD AUTO: 0.16 K/UL
EOSINOPHIL NFR BLD AUTO: 2.7 %
ESTIMATED AVERAGE GLUCOSE: 131 MG/DL
HBA1C MFR BLD HPLC: 6.2 %
HCT VFR BLD CALC: 49.8 %
HGB BLD-MCNC: 16.4 G/DL
IMM GRANULOCYTES NFR BLD AUTO: 0.3 %
LYMPHOCYTES # BLD AUTO: 2.01 K/UL
LYMPHOCYTES NFR BLD AUTO: 34.5 %
MAN DIFF?: NORMAL
MCHC RBC-ENTMCNC: 29.9 PG
MCHC RBC-ENTMCNC: 32.9 G/DL
MCV RBC AUTO: 90.7 FL
MONOCYTES # BLD AUTO: 0.42 K/UL
MONOCYTES NFR BLD AUTO: 7.2 %
NEUTROPHILS # BLD AUTO: 3.2 K/UL
NEUTROPHILS NFR BLD AUTO: 55 %
PLATELET # BLD AUTO: 176 K/UL
RBC # BLD: 5.49 M/UL
RBC # FLD: 13.3 %
WBC # FLD AUTO: 5.83 K/UL